# Patient Record
Sex: MALE | Race: AMERICAN INDIAN OR ALASKA NATIVE | URBAN - METROPOLITAN AREA
[De-identification: names, ages, dates, MRNs, and addresses within clinical notes are randomized per-mention and may not be internally consistent; named-entity substitution may affect disease eponyms.]

---

## 2018-09-30 ENCOUNTER — TRANSFERRED RECORDS (OUTPATIENT)
Dept: HEALTH INFORMATION MANAGEMENT | Facility: CLINIC | Age: 68
End: 2018-09-30

## 2019-01-05 ENCOUNTER — TRANSFERRED RECORDS (OUTPATIENT)
Dept: HEALTH INFORMATION MANAGEMENT | Facility: CLINIC | Age: 69
End: 2019-01-05

## 2019-01-06 ENCOUNTER — TRANSFERRED RECORDS (OUTPATIENT)
Dept: HEALTH INFORMATION MANAGEMENT | Facility: CLINIC | Age: 69
End: 2019-01-06

## 2019-01-08 ENCOUNTER — TRANSFERRED RECORDS (OUTPATIENT)
Dept: HEALTH INFORMATION MANAGEMENT | Facility: CLINIC | Age: 69
End: 2019-01-08

## 2019-01-15 ENCOUNTER — RECORDS - HEALTHEAST (OUTPATIENT)
Dept: ADMINISTRATIVE | Facility: OTHER | Age: 69
End: 2019-01-15

## 2019-02-20 ENCOUNTER — TRANSFERRED RECORDS (OUTPATIENT)
Dept: HEALTH INFORMATION MANAGEMENT | Facility: CLINIC | Age: 69
End: 2019-02-20

## 2019-02-22 ENCOUNTER — TRANSFERRED RECORDS (OUTPATIENT)
Dept: HEALTH INFORMATION MANAGEMENT | Facility: CLINIC | Age: 69
End: 2019-02-22

## 2019-04-22 ENCOUNTER — TRANSFERRED RECORDS (OUTPATIENT)
Dept: HEALTH INFORMATION MANAGEMENT | Facility: CLINIC | Age: 69
End: 2019-04-22

## 2019-08-12 ENCOUNTER — RECORDS - HEALTHEAST (OUTPATIENT)
Dept: ADMINISTRATIVE | Facility: OTHER | Age: 69
End: 2019-08-12

## 2019-08-12 ENCOUNTER — TRANSFERRED RECORDS (OUTPATIENT)
Dept: HEALTH INFORMATION MANAGEMENT | Facility: CLINIC | Age: 69
End: 2019-08-12

## 2019-08-13 ENCOUNTER — RECORDS - HEALTHEAST (OUTPATIENT)
Dept: ADMINISTRATIVE | Facility: OTHER | Age: 69
End: 2019-08-13

## 2019-08-13 ENCOUNTER — TRANSFERRED RECORDS (OUTPATIENT)
Dept: HEALTH INFORMATION MANAGEMENT | Facility: CLINIC | Age: 69
End: 2019-08-13

## 2019-08-15 ENCOUNTER — AMBULATORY - HEALTHEAST (OUTPATIENT)
Dept: VASCULAR SURGERY | Facility: CLINIC | Age: 69
End: 2019-08-15

## 2019-08-15 DIAGNOSIS — N18.5 CHRONIC KIDNEY DISEASE (CKD), STAGE V (H): ICD-10-CM

## 2019-08-16 ENCOUNTER — MEDICAL CORRESPONDENCE (OUTPATIENT)
Dept: HEALTH INFORMATION MANAGEMENT | Facility: CLINIC | Age: 69
End: 2019-08-16

## 2019-08-19 ENCOUNTER — REFERRAL (OUTPATIENT)
Dept: TRANSPLANT | Facility: CLINIC | Age: 69
End: 2019-08-19

## 2019-08-19 DIAGNOSIS — E11.9 DIABETES MELLITUS, TYPE 2 (H): ICD-10-CM

## 2019-08-19 DIAGNOSIS — Z76.82 ORGAN TRANSPLANT CANDIDATE: ICD-10-CM

## 2019-08-19 DIAGNOSIS — I10 ESSENTIAL HYPERTENSION: ICD-10-CM

## 2019-08-19 NOTE — LETTER
Dylon Trevizo  [address] [place] [id number]      Dear Dylon,    Thank you for your interest in the Transplant Center at [facility], [facility]. We look forward to being a part of your care team and assisting you through the transplant process.    As we discussed, your transplant coordinator is Patsy Galeana (Kidney).  You may call your coordinator at any time with questions or concerns.  Your first scheduled call will be on 9/6/19.  If this needs to change, call [phone].    Please complete the following.    1. Fill out and return the enclosed forms    Authorization for Electronic Communication    Authorization to Discuss Protected Health Information    Authorization for Release of Protected Health Information    Authorization for Care Everywhere Release of Information    2. Sign up for:    Power[employer] Mobilet, access to your electronic medical record (see enclosed pamphlet)    MasalatransplantMippin, a transplant education website    You can use these tools to learn more about your transplant, communicate with your care team, and track your medical details      Sincerely,      Solid Organ Transplant  [facility], [facility]    Cc: Usha Charlton MD(PCP), Mc Gomez

## 2019-08-19 NOTE — Clinical Note
Orders in epic for evaluation Yamileth and LUIS MIGUEL Notified patients with 10/16/2019 EVAL date; Makenzie and his son will come too. They are aware a schedule will come in two weeks from Yamileth.

## 2019-08-19 NOTE — LETTER
September 6, 2019          RE:  Dylon PANDEY Santhosh, 1950        You may or may not know that the above mutual patient is in evaluation for a kidney transplant here at the [facility].    One of the requirements of our patients to be in our transplant program is for them to provide a letter from a dentist stating that they are cleared for transplant; meaning they do not currently have any infections or dental conditions that would prevent them from going forward with transplant surgery.     Please provide a signed note at your earliest convenience, on company letterhead, with the dentition status of the above referenced patient; our direct fax number is [phone].    Thank you in advance for your assistance in this matter.        Patsy Galeana RN BSN   Kidney Transplant Wait List  [phone] Phone  [phone] Fax  [email or web address]     [facility]  [facility] [address] [address]

## 2019-08-21 ENCOUNTER — AMBULATORY - HEALTHEAST (OUTPATIENT)
Dept: VASCULAR SURGERY | Facility: CLINIC | Age: 69
End: 2019-08-21

## 2019-08-21 DIAGNOSIS — N18.5 CKD (CHRONIC KIDNEY DISEASE), STAGE V (H): ICD-10-CM

## 2019-08-22 ENCOUNTER — DOCUMENTATION ONLY (OUTPATIENT)
Dept: TRANSPLANT | Facility: CLINIC | Age: 69
End: 2019-08-22

## 2019-08-22 VITALS — HEIGHT: 70 IN | BODY MASS INDEX: 32.21 KG/M2 | WEIGHT: 225 LBS

## 2019-08-22 ASSESSMENT — MIFFLIN-ST. JEOR: SCORE: 1801.84

## 2019-08-22 NOTE — TELEPHONE ENCOUNTER
PCP:Usha Charlton    Referring Provider:Mc Gomez   Referring Diagnosis:     Insurance information:  MEDICARE/MEDICAID/Forward  Policy ornelas:  SELF  Subscriber/policy/ID number:  Pt did not have info available  /    Pt did not have info available /[id number]  Group Number:  /    /    Is patient in a group home/assisted living? NO  Does patient have a guardian? NO    Referral intake process completed.  Patient is aware that after financial approval is received, medical records will be requested.   Patient confirmed for a callback from transplant coordinator on 9/6/19. (within 2 weeks)  Tentative evaluation date:  Not scheduled (within 4 weeks)    Confirmed coordinator will discuss evaluation process in more detail at the time of their call.   Patient is aware of the need to arrange age appropriate cancer screening, vaccinations, and dental care.  Reminded patient to complete questionnaire, complete medical records release, and review packet prior to evaluation visit .  Assessed patient for special needs (ie--wheelchair, assistance, guardian, and ):  NONE   Patient instructed to call [phone] with questions.

## 2019-08-28 ENCOUNTER — OFFICE VISIT - HEALTHEAST (OUTPATIENT)
Dept: VASCULAR SURGERY | Facility: CLINIC | Age: 69
End: 2019-08-28

## 2019-08-28 ENCOUNTER — AMBULATORY - HEALTHEAST (OUTPATIENT)
Dept: VASCULAR SURGERY | Facility: CLINIC | Age: 69
End: 2019-08-28

## 2019-08-28 ENCOUNTER — RECORDS - HEALTHEAST (OUTPATIENT)
Dept: VASCULAR ULTRASOUND | Facility: CLINIC | Age: 69
End: 2019-08-28

## 2019-08-28 DIAGNOSIS — N18.5 CHRONIC KIDNEY DISEASE, STAGE 5 (H): ICD-10-CM

## 2019-08-28 DIAGNOSIS — T82.898A OTHER SPECIFIED COMPLICATION OF VASCULAR PROSTHETIC DEVICES, IMPLANTS AND GRAFTS, INITIAL ENCOUNTER (H): ICD-10-CM

## 2019-08-28 DIAGNOSIS — N18.5 CHRONIC KIDNEY DISEASE (CKD), STAGE V (H): ICD-10-CM

## 2019-08-28 DIAGNOSIS — N18.6 ESRD (END STAGE RENAL DISEASE) (H): ICD-10-CM

## 2019-08-28 ASSESSMENT — MIFFLIN-ST. JEOR: SCORE: 1766.9

## 2019-09-05 ENCOUNTER — TRANSFERRED RECORDS (OUTPATIENT)
Dept: HEALTH INFORMATION MANAGEMENT | Facility: CLINIC | Age: 69
End: 2019-09-05

## 2019-09-06 NOTE — TELEPHONE ENCOUNTER
I/Patsy called Dylon and he was driving and could not take my call. I said I would call back.   I called Dylon and left a voice message.   Contacted patient and introduced myself as their Transplant Coordinator, also introduced the role of the Transplant Coordinator in the transplant process.  Explained the purpose of this call including reviewing next steps and answering questions.    Confirmed Referring Provider and Primary Care Physician. Notified patient of the importance of continued communication with referring providers and primary care physicians.    Reviewed components of transplant evaluation process including necessary appointments, tests, and procedures.    Answered questions for patient regarding evaluation, provided my name and contact information and requested they call with any additional questions.    Determined that patient would like additional information regarding transplant by:   Solomon, Phone Call; Encourage Solomon Carson and I Notified patients with 10/16/2019 EVAL date; Makenzie and his son will come too. They are aware a schedule will come in two weeks from Yamileth.      Dr. Marzena Shelby 8/12/2019 note  Progressive CKD mostly likely due to diabetic nephropathy but complicated by episodes of SHAVONNE   HTN controlled   Anemia with documented low epo levels thus anemia of CKD.   Anxiety and depression   Asthma Follows with Pulm.   RTC 2 months.        PCP Care everywhere note Past Medical History: Diagnosis Date     Acidosis 4/26/2018     Anxiety     Asthma   since age 8     Chronic pain     CKD (chronic kidney disease)   stage 3     CKD (chronic kidney disease) stage 4, GFR 15-29 ml/min (HC) 12/12/2018     COAD (chronic obstructive airways disease) (HC)     Depression     Diabetes (HC)     Diabetic eye exam (HC) 09/27/2016     Dyspepsia 12/12/2018     Fracture of right foot 2006   5th digit     Full code status 3/28/2018     GERD (gastroesophageal reflux disease)     Herpes zoster  09/13/2017   Shingles     Hyperlipidemia     Hypertension     Insomnia 09/30/2018     Pain of right upper extremity 4/26/2018     Pneumonia due to infectious organism 1/8/2017     Primary osteoarthritis involving multiple joints 3/20/2018     Shingles 9/5/2017     Vitamin D deficiency 09/30/2018

## 2019-09-11 ENCOUNTER — COMMUNICATION - HEALTHEAST (OUTPATIENT)
Dept: VASCULAR SURGERY | Facility: CLINIC | Age: 69
End: 2019-09-11

## 2019-09-13 ENCOUNTER — AMBULATORY - HEALTHEAST (OUTPATIENT)
Dept: VASCULAR SURGERY | Facility: CLINIC | Age: 69
End: 2019-09-13

## 2019-09-13 DIAGNOSIS — N18.6 ESRD (END STAGE RENAL DISEASE) (H): ICD-10-CM

## 2019-09-23 ENCOUNTER — TRANSFERRED RECORDS (OUTPATIENT)
Dept: HEALTH INFORMATION MANAGEMENT | Facility: CLINIC | Age: 69
End: 2019-09-23

## 2019-09-27 ENCOUNTER — OFFICE VISIT - HEALTHEAST (OUTPATIENT)
Dept: SURGERY | Facility: CLINIC | Age: 69
End: 2019-09-27

## 2019-09-27 ENCOUNTER — COMMUNICATION - HEALTHEAST (OUTPATIENT)
Dept: VASCULAR SURGERY | Facility: CLINIC | Age: 69
End: 2019-09-27

## 2019-09-27 DIAGNOSIS — N18.6 END STAGE RENAL DISEASE (H): ICD-10-CM

## 2019-09-27 ASSESSMENT — MIFFLIN-ST. JEOR: SCORE: 1757.83

## 2019-10-08 ENCOUNTER — SURGERY - HEALTHEAST (OUTPATIENT)
Dept: SURGERY | Facility: CLINIC | Age: 69
End: 2019-10-08

## 2019-10-11 ENCOUNTER — ANESTHESIA - HEALTHEAST (OUTPATIENT)
Dept: SURGERY | Facility: HOSPITAL | Age: 69
End: 2019-10-11

## 2019-10-11 ENCOUNTER — SURGERY - HEALTHEAST (OUTPATIENT)
Dept: SURGERY | Facility: HOSPITAL | Age: 69
End: 2019-10-11

## 2019-10-11 ENCOUNTER — AMBULATORY - HEALTHEAST (OUTPATIENT)
Dept: SURGERY | Facility: CLINIC | Age: 69
End: 2019-10-11

## 2019-10-11 DIAGNOSIS — N18.6 END STAGE RENAL DISEASE (H): ICD-10-CM

## 2019-10-16 ENCOUNTER — ANCILLARY PROCEDURE (OUTPATIENT)
Dept: GENERAL RADIOLOGY | Facility: CLINIC | Age: 69
End: 2019-10-16
Attending: PHYSICIAN ASSISTANT
Payer: MEDICARE

## 2019-10-16 ENCOUNTER — ALLIED HEALTH/NURSE VISIT (OUTPATIENT)
Dept: TRANSPLANT | Facility: CLINIC | Age: 69
End: 2019-10-16
Attending: PHYSICIAN ASSISTANT
Payer: MEDICARE

## 2019-10-16 ENCOUNTER — ANCILLARY PROCEDURE (OUTPATIENT)
Dept: CARDIOLOGY | Facility: CLINIC | Age: 69
End: 2019-10-16
Attending: PHYSICIAN ASSISTANT
Payer: MEDICARE

## 2019-10-16 ENCOUNTER — DOCUMENTATION ONLY (OUTPATIENT)
Dept: TRANSPLANT | Facility: CLINIC | Age: 69
End: 2019-10-16

## 2019-10-16 VITALS
BODY MASS INDEX: 33.21 KG/M2 | WEIGHT: 232 LBS | OXYGEN SATURATION: 96 % | DIASTOLIC BLOOD PRESSURE: 73 MMHG | SYSTOLIC BLOOD PRESSURE: 144 MMHG | HEART RATE: 74 BPM | HEIGHT: 70 IN

## 2019-10-16 DIAGNOSIS — E11.9 DIABETES MELLITUS, TYPE 2 (H): ICD-10-CM

## 2019-10-16 DIAGNOSIS — N18.5 TYPE 2 DIABETES MELLITUS WITH STAGE 5 CHRONIC KIDNEY DISEASE NOT ON CHRONIC DIALYSIS, WITH LONG-TERM CURRENT USE OF INSULIN (H): ICD-10-CM

## 2019-10-16 DIAGNOSIS — Z76.82 ORGAN TRANSPLANT CANDIDATE: ICD-10-CM

## 2019-10-16 DIAGNOSIS — Z12.5 ENCOUNTER FOR SCREENING FOR MALIGNANT NEOPLASM OF PROSTATE: ICD-10-CM

## 2019-10-16 DIAGNOSIS — E11.22 TYPE 2 DIABETES MELLITUS WITH STAGE 5 CHRONIC KIDNEY DISEASE NOT ON CHRONIC DIALYSIS, WITH LONG-TERM CURRENT USE OF INSULIN (H): ICD-10-CM

## 2019-10-16 DIAGNOSIS — N18.5 CKD (CHRONIC KIDNEY DISEASE) STAGE 5, GFR LESS THAN 15 ML/MIN (H): ICD-10-CM

## 2019-10-16 DIAGNOSIS — I10 ESSENTIAL HYPERTENSION: ICD-10-CM

## 2019-10-16 DIAGNOSIS — I65.21 CAROTID OCCLUSION, RIGHT: ICD-10-CM

## 2019-10-16 DIAGNOSIS — Z79.4 TYPE 2 DIABETES MELLITUS WITH STAGE 5 CHRONIC KIDNEY DISEASE NOT ON CHRONIC DIALYSIS, WITH LONG-TERM CURRENT USE OF INSULIN (H): ICD-10-CM

## 2019-10-16 DIAGNOSIS — I65.21 STENOSIS OF RIGHT CAROTID ARTERY: ICD-10-CM

## 2019-10-16 DIAGNOSIS — F41.9 ANXIETY AND DEPRESSION: ICD-10-CM

## 2019-10-16 DIAGNOSIS — Z76.82 ORGAN TRANSPLANT CANDIDATE: Primary | ICD-10-CM

## 2019-10-16 DIAGNOSIS — F32.A ANXIETY AND DEPRESSION: ICD-10-CM

## 2019-10-16 DIAGNOSIS — R06.03 RESPIRATORY DISTRESS: Primary | ICD-10-CM

## 2019-10-16 DIAGNOSIS — N18.6 END STAGE RENAL DISEASE (H): ICD-10-CM

## 2019-10-16 DIAGNOSIS — J45.50 SEVERE PERSISTENT ASTHMA, UNSPECIFIED WHETHER COMPLICATED (H): ICD-10-CM

## 2019-10-16 DIAGNOSIS — Z01.818 PRE-TRANSPLANT EVALUATION FOR CKD (CHRONIC KIDNEY DISEASE): Primary | ICD-10-CM

## 2019-10-16 DIAGNOSIS — N18.6 ESRD (END STAGE RENAL DISEASE) (H): Primary | ICD-10-CM

## 2019-10-16 DIAGNOSIS — N18.5 CKD (CHRONIC KIDNEY DISEASE), STAGE V (H): ICD-10-CM

## 2019-10-16 LAB
ABO + RH BLD: NORMAL
ALBUMIN SERPL-MCNC: 2.8 G/DL (ref 3.4–5)
ALP SERPL-CCNC: 100 U/L (ref 40–150)
ALT SERPL W P-5'-P-CCNC: 17 U/L (ref 0–70)
ANION GAP SERPL CALCULATED.3IONS-SCNC: 8 MMOL/L (ref 3–14)
APTT PPP: 31 SEC (ref 22–37)
AST SERPL W P-5'-P-CCNC: 18 U/L (ref 0–45)
BASOPHILS # BLD AUTO: 0.1 10E9/L (ref 0–0.2)
BASOPHILS NFR BLD AUTO: 0.9 %
BILIRUB SERPL-MCNC: 0.2 MG/DL (ref 0.2–1.3)
BLD GP AB SCN SERPL QL: NORMAL
BLOOD BANK CMNT PATIENT-IMP: NORMAL
BUN SERPL-MCNC: 85 MG/DL (ref 7–30)
CALCIUM SERPL-MCNC: 7.9 MG/DL (ref 8.5–10.1)
CHLORIDE SERPL-SCNC: 115 MMOL/L (ref 94–109)
CO2 SERPL-SCNC: 18 MMOL/L (ref 20–32)
CREAT SERPL-MCNC: 6.31 MG/DL (ref 0.66–1.25)
DIFFERENTIAL METHOD BLD: ABNORMAL
EOSINOPHIL # BLD AUTO: 0.8 10E9/L (ref 0–0.7)
EOSINOPHIL NFR BLD AUTO: 10.1 %
ERYTHROCYTE [DISTWIDTH] IN BLOOD BY AUTOMATED COUNT: 13.9 % (ref 10–15)
GFR SERPL CREATININE-BSD FRML MDRD: 8 ML/MIN/{1.73_M2}
GLUCOSE SERPL-MCNC: 112 MG/DL (ref 70–99)
HBA1C MFR BLD: 4.8 % (ref 0–5.6)
HCT VFR BLD AUTO: 30.5 % (ref 40–53)
HGB BLD-MCNC: 9.5 G/DL (ref 13.3–17.7)
IMM GRANULOCYTES # BLD: 0 10E9/L (ref 0–0.4)
IMM GRANULOCYTES NFR BLD: 0.5 %
INR PPP: 0.99 (ref 0.86–1.14)
LYMPHOCYTES # BLD AUTO: 2.5 10E9/L (ref 0.8–5.3)
LYMPHOCYTES NFR BLD AUTO: 30.6 %
MCH RBC QN AUTO: 29.6 PG (ref 26.5–33)
MCHC RBC AUTO-ENTMCNC: 31.1 G/DL (ref 31.5–36.5)
MCV RBC AUTO: 95 FL (ref 78–100)
MONOCYTES # BLD AUTO: 0.7 10E9/L (ref 0–1.3)
MONOCYTES NFR BLD AUTO: 8.4 %
NEUTROPHILS # BLD AUTO: 4.1 10E9/L (ref 1.6–8.3)
NEUTROPHILS NFR BLD AUTO: 49.5 %
NRBC # BLD AUTO: 0 10*3/UL
NRBC BLD AUTO-RTO: 0 /100
PLATELET # BLD AUTO: 258 10E9/L (ref 150–450)
POTASSIUM SERPL-SCNC: 5.5 MMOL/L (ref 3.4–5.3)
PROT SERPL-MCNC: 6.2 G/DL (ref 6.8–8.8)
PSA SERPL-ACNC: 1.57 UG/L (ref 0–4)
RADIOLOGIST FLAGS: NORMAL
RBC # BLD AUTO: 3.21 10E12/L (ref 4.4–5.9)
SODIUM SERPL-SCNC: 141 MMOL/L (ref 133–144)
SPECIMEN EXP DATE BLD: NORMAL
SPECIMEN EXP DATE BLD: NORMAL
WBC # BLD AUTO: 8.2 10E9/L (ref 4–11)

## 2019-10-16 PROCEDURE — 81241 F5 GENE: CPT | Performed by: PHYSICIAN ASSISTANT

## 2019-10-16 PROCEDURE — 84681 ASSAY OF C-PEPTIDE: CPT | Performed by: PHYSICIAN ASSISTANT

## 2019-10-16 PROCEDURE — 85025 COMPLETE CBC W/AUTO DIFF WBC: CPT | Performed by: PHYSICIAN ASSISTANT

## 2019-10-16 PROCEDURE — 86644 CMV ANTIBODY: CPT | Performed by: PHYSICIAN ASSISTANT

## 2019-10-16 PROCEDURE — 86787 VARICELLA-ZOSTER ANTIBODY: CPT | Performed by: PHYSICIAN ASSISTANT

## 2019-10-16 PROCEDURE — G0472 HEP C SCREEN HIGH RISK/OTHER: HCPCS | Performed by: PHYSICIAN ASSISTANT

## 2019-10-16 PROCEDURE — 86481 TB AG RESPONSE T-CELL SUSP: CPT | Performed by: PHYSICIAN ASSISTANT

## 2019-10-16 PROCEDURE — 81240 F2 GENE: CPT | Performed by: PHYSICIAN ASSISTANT

## 2019-10-16 PROCEDURE — 85730 THROMBOPLASTIN TIME PARTIAL: CPT | Performed by: PHYSICIAN ASSISTANT

## 2019-10-16 PROCEDURE — 86704 HEP B CORE ANTIBODY TOTAL: CPT | Performed by: PHYSICIAN ASSISTANT

## 2019-10-16 PROCEDURE — 85597 PHOSPHOLIPID PLTLT NEUTRALIZ: CPT | Performed by: PHYSICIAN ASSISTANT

## 2019-10-16 PROCEDURE — 86665 EPSTEIN-BARR CAPSID VCA: CPT | Performed by: PHYSICIAN ASSISTANT

## 2019-10-16 PROCEDURE — 85670 THROMBIN TIME PLASMA: CPT | Performed by: PHYSICIAN ASSISTANT

## 2019-10-16 PROCEDURE — G0499 HEPB SCREEN HIGH RISK INDIV: HCPCS | Performed by: PHYSICIAN ASSISTANT

## 2019-10-16 PROCEDURE — G0103 PSA SCREENING: HCPCS | Performed by: PHYSICIAN ASSISTANT

## 2019-10-16 PROCEDURE — 40000866 ZZHCL STATISTIC HIV 1/2 ANTIGEN/ANTIBODY PRETRANSPLANT ONLY: Performed by: PHYSICIAN ASSISTANT

## 2019-10-16 PROCEDURE — 86147 CARDIOLIPIN ANTIBODY EA IG: CPT | Performed by: PHYSICIAN ASSISTANT

## 2019-10-16 PROCEDURE — 86901 BLOOD TYPING SEROLOGIC RH(D): CPT | Performed by: PHYSICIAN ASSISTANT

## 2019-10-16 PROCEDURE — 86900 BLOOD TYPING SEROLOGIC ABO: CPT | Mod: 91 | Performed by: PHYSICIAN ASSISTANT

## 2019-10-16 PROCEDURE — 86886 COOMBS TEST INDIRECT TITER: CPT | Performed by: PHYSICIAN ASSISTANT

## 2019-10-16 PROCEDURE — 83036 HEMOGLOBIN GLYCOSYLATED A1C: CPT | Performed by: PHYSICIAN ASSISTANT

## 2019-10-16 PROCEDURE — 85613 RUSSELL VIPER VENOM DILUTED: CPT | Performed by: PHYSICIAN ASSISTANT

## 2019-10-16 PROCEDURE — 86706 HEP B SURFACE ANTIBODY: CPT | Performed by: PHYSICIAN ASSISTANT

## 2019-10-16 PROCEDURE — 36415 COLL VENOUS BLD VENIPUNCTURE: CPT | Performed by: PHYSICIAN ASSISTANT

## 2019-10-16 PROCEDURE — 85610 PROTHROMBIN TIME: CPT | Performed by: PHYSICIAN ASSISTANT

## 2019-10-16 PROCEDURE — 86780 TREPONEMA PALLIDUM: CPT | Performed by: PHYSICIAN ASSISTANT

## 2019-10-16 PROCEDURE — 85732 THROMBOPLASTIN TIME PARTIAL: CPT | Performed by: PHYSICIAN ASSISTANT

## 2019-10-16 PROCEDURE — 80053 COMPREHEN METABOLIC PANEL: CPT | Performed by: PHYSICIAN ASSISTANT

## 2019-10-16 PROCEDURE — 86850 RBC ANTIBODY SCREEN: CPT | Performed by: PHYSICIAN ASSISTANT

## 2019-10-16 PROCEDURE — 86905 BLOOD TYPING RBC ANTIGENS: CPT | Performed by: PHYSICIAN ASSISTANT

## 2019-10-16 RX ORDER — LISINOPRIL 40 MG/1
40 TABLET ORAL 2 TIMES DAILY
Status: ON HOLD | COMMUNITY
End: 2021-08-22

## 2019-10-16 RX ORDER — ATORVASTATIN CALCIUM 20 MG/1
10 TABLET, FILM COATED ORAL DAILY
COMMUNITY
Start: 2018-12-06

## 2019-10-16 RX ORDER — GABAPENTIN 100 MG/1
CAPSULE ORAL
Status: ON HOLD | COMMUNITY
Start: 2018-03-09 | End: 2021-08-22

## 2019-10-16 RX ORDER — ALBUTEROL SULFATE 90 UG/1
2 AEROSOL, METERED RESPIRATORY (INHALATION) EVERY 6 HOURS PRN
COMMUNITY
Start: 2016-11-16

## 2019-10-16 RX ORDER — HYDROMORPHONE HYDROCHLORIDE 2 MG/1
2 TABLET ORAL EVERY 6 HOURS PRN
Status: ON HOLD | COMMUNITY
Start: 2019-10-11 | End: 2021-08-28

## 2019-10-16 RX ORDER — CETIRIZINE HYDROCHLORIDE 10 MG/1
TABLET ORAL
COMMUNITY
End: 2021-08-21

## 2019-10-16 RX ORDER — PANTOPRAZOLE SODIUM 40 MG/1
40 TABLET, DELAYED RELEASE ORAL
Status: ON HOLD | COMMUNITY
End: 2021-08-22

## 2019-10-16 RX ORDER — NEBULIZER ACCESSORIES
KIT MISCELLANEOUS
Status: ON HOLD | COMMUNITY
Start: 2019-06-26 | End: 2021-08-22

## 2019-10-16 RX ORDER — LANOLIN ALCOHOL/MO/W.PET/CERES
3 CREAM (GRAM) TOPICAL
COMMUNITY
Start: 2018-11-26 | End: 2021-08-21

## 2019-10-16 RX ORDER — CHLORTHALIDONE 25 MG/1
25 TABLET ORAL 2 TIMES DAILY
Status: ON HOLD | COMMUNITY
End: 2021-08-22

## 2019-10-16 RX ORDER — ONDANSETRON 4 MG/1
4 TABLET, ORALLY DISINTEGRATING ORAL
COMMUNITY
Start: 2019-06-20 | End: 2021-08-21

## 2019-10-16 RX ORDER — SENNOSIDES A AND B 8.6 MG/1
1 TABLET, FILM COATED ORAL
COMMUNITY
Start: 2018-12-06 | End: 2021-08-21

## 2019-10-16 RX ORDER — ACETAMINOPHEN 325 MG/1
650 TABLET ORAL EVERY 6 HOURS PRN
COMMUNITY

## 2019-10-16 RX ORDER — CARVEDILOL 12.5 MG/1
25 TABLET ORAL 2 TIMES DAILY WITH MEALS
COMMUNITY
Start: 2018-11-26

## 2019-10-16 RX ORDER — SEVELAMER CARBONATE FOR ORAL SUSPENSION 800 MG/1
0.8 POWDER, FOR SUSPENSION ORAL
Status: ON HOLD | COMMUNITY
Start: 2018-11-26 | End: 2021-08-22

## 2019-10-16 RX ORDER — CLINDAMYCIN PHOSPHATE 11.9 MG/ML
SOLUTION TOPICAL
COMMUNITY
Start: 2019-06-21 | End: 2021-08-21

## 2019-10-16 RX ORDER — LORATADINE 10 MG/1
10 TABLET ORAL
COMMUNITY
End: 2021-08-21

## 2019-10-16 RX ORDER — POLYETHYLENE GLYCOL 3350 17 G/17G
17 POWDER, FOR SOLUTION ORAL
COMMUNITY
Start: 2018-03-09 | End: 2021-08-21

## 2019-10-16 RX ORDER — BUDESONIDE AND FORMOTEROL FUMARATE DIHYDRATE 160; 4.5 UG/1; UG/1
2 AEROSOL RESPIRATORY (INHALATION)
COMMUNITY
Start: 2019-06-26 | End: 2021-08-21

## 2019-10-16 RX ORDER — GLUCAGON 1 MG/ML
1 KIT INJECTION
COMMUNITY
Start: 2018-11-26 | End: 2021-08-21

## 2019-10-16 RX ORDER — CYCLOBENZAPRINE HCL 10 MG
TABLET ORAL
COMMUNITY
Start: 2019-01-08 | End: 2021-08-21

## 2019-10-16 RX ORDER — PETROLATUM,WHITE/LANOLIN
OINTMENT (GRAM) TOPICAL
COMMUNITY
Start: 2019-05-08 | End: 2021-08-21

## 2019-10-16 RX ORDER — IPRATROPIUM BROMIDE AND ALBUTEROL SULFATE 2.5; .5 MG/3ML; MG/3ML
3 SOLUTION RESPIRATORY (INHALATION) EVERY 6 HOURS PRN
COMMUNITY
Start: 2016-04-25

## 2019-10-16 RX ORDER — HALOPERIDOL 5 MG/1
2.5-5 TABLET ORAL
Status: ON HOLD | COMMUNITY
Start: 2018-11-26 | End: 2021-08-28

## 2019-10-16 RX ORDER — ERGOCALCIFEROL 1.25 MG/1
CAPSULE, LIQUID FILLED ORAL
Refills: 0 | COMMUNITY
Start: 2019-10-01 | End: 2021-08-21

## 2019-10-16 RX ORDER — FLUTICASONE PROPIONATE 50 MCG
SPRAY, SUSPENSION (ML) NASAL
COMMUNITY
Start: 2016-01-19 | End: 2021-08-21

## 2019-10-16 RX ORDER — BETAMETHASONE VALERATE 0.1 %
LOTION (ML) TOPICAL
COMMUNITY
End: 2021-08-21

## 2019-10-16 RX ORDER — SODIUM BICARBONATE 325 MG/1
325 TABLET ORAL
Status: ON HOLD | COMMUNITY
Start: 2018-03-09 | End: 2021-08-22

## 2019-10-16 RX ORDER — OLANZAPINE 5 MG/1
2.5 TABLET ORAL
Status: ON HOLD | COMMUNITY
End: 2021-08-28

## 2019-10-16 RX ORDER — DOXYCYCLINE 100 MG/1
CAPSULE ORAL
Refills: 0 | COMMUNITY
Start: 2019-06-21 | End: 2021-08-21

## 2019-10-16 RX ORDER — AMLODIPINE BESYLATE 10 MG/1
TABLET ORAL
Status: ON HOLD | COMMUNITY
Start: 2019-06-26 | End: 2021-08-22

## 2019-10-16 RX ORDER — LORAZEPAM 1 MG/1
1 TABLET ORAL
COMMUNITY
Start: 2018-11-26 | End: 2021-08-21

## 2019-10-16 RX ORDER — PAROXETINE 20 MG/1
TABLET, FILM COATED ORAL
Status: ON HOLD | COMMUNITY
Start: 2018-11-26 | End: 2021-08-22

## 2019-10-16 RX ORDER — FAMOTIDINE 20 MG/1
20 TABLET, FILM COATED ORAL
COMMUNITY
Start: 2018-11-26 | End: 2021-08-21

## 2019-10-16 RX ORDER — SODIUM POLYSTYRENE SULFONATE 4.1 MEQ/G
POWDER, FOR SUSPENSION ORAL; RECTAL
COMMUNITY
Start: 2019-09-05 | End: 2021-08-21

## 2019-10-16 RX ORDER — FUROSEMIDE 40 MG
40 TABLET ORAL DAILY
COMMUNITY
Start: 2016-03-07

## 2019-10-16 RX ORDER — DOCUSATE SODIUM 100 MG/1
100 CAPSULE, LIQUID FILLED ORAL
COMMUNITY
Start: 2018-11-26 | End: 2021-08-21

## 2019-10-16 ASSESSMENT — MIFFLIN-ST. JEOR: SCORE: 1825.66

## 2019-10-16 NOTE — LETTER
10/16/2019       RE: Dylon Trevizo  [address] [facility] [address]     Dear Colleague,    Thank you for referring your patient, Dylon Trevizo, to the [facility] SOLID ORGAN TRANSPLANT at [facility]. Please see a copy of my visit note below.    RE: Dylon Trevizo    [facility]# [id number]        I saw your patient, Dylon Trevizo, in consultation in our pretransplant clinic.  He was at clinic to discuss care for his end-stage renal disease.  He was at clinic with his significant other.  Prior to clinic, they attended our pretransplant class.       We talked about the pros and cons of transplantation vs. dialysis.  We discussed the fact that it was important that he think about the pros and cons of each treatment option and make an active decision.  We also discussed the fact that the two were interconnected and he may need to go on dialysis before transplant (if he chose to have a transplant) and that if the transplant failed, he might need dialysis before another transplant.       We also discussed the fact that if he chose to have a transplant, he would need to decide between going on the wait list for a  donor transplant vs. having a living donor transplant.  We talked about the pros and cons of each option.  Although I didn't express an opinion regarding transplantation or dialysis, I suggested that if he chose to have a transplant, a living donor transplant would be preferable in that the surgery is the same, the immunosuppressive drugs and the risks are the same, but the transplant could be done sooner and the results are better.  I told him that the wait for  donor kidney was approximately five years for patients who are newly put on the waiting list.  In addition, we talked about the fact that the disadvantage of a living donor transplant was the risk to the donor.       Because he was interested in living donation, we spent some time discussing  "donor risks, including the risks of mortality, morbidity, and long-term risks with a single kidney. I also provided him with our donor DVD to view and share at his leisure.     Finally, I also discussed the new ( donor) kidney (KDPI) scoring system with him.  We discussed the advantages and disadvantages of accepting an \"expanded criteria\" donor kidney, and the latest data as to who is potentially benefited by receiving an expanded criteria donor kidney versus waiting longer for a standard criteria donor.     I attempted to answer any remaining questions.  I also told him that should he have any questions, he should feel free to contact us.  We would be glad to answer any questions either over the phone or at another clinic visit.  His transplant coordinator is Patsy Galeana and may be reached at [phone].  Thank you for the opportunity to see him.     I spent 25 minutes with this patient.  Over 90% of that time was spent in counseling and coordination of care.             Yours truly,               Rodrigo Lopez MD         Professor of Surgery         ([phone])    AJ/st    Again, thank you for allowing me to participate in the care of your patient.      Sincerely,    LEEANNE      "

## 2019-10-16 NOTE — PROGRESS NOTES
Patient was seen by myself, Dr. Mitchel Wilkinson, in conjunction with Christi Dickens, as part of a shared visit.    I personally reviewed past medical and surgical history, vital signs, medications and labs.  Present and past medical history, along with significant physical exam findings were all reviewed with SARA.    My key findings:  Dylon Trevizo is a 68 year old year old male with CKD from diabetes mellitus type 2, who presents for Kidney transplant evaluation.  Patient was diagnosed with CKD in 2016. He has had progression and planning initiation of PD next week.     DMII: many years. Now off insulin. a1c good on last check. No amputations, no ulcers.     Lost 100 lbs during his hospitalization with severe asthma.     Asthma: uncontrolled. On symbicort, flonase, albuterol.     Was hospitalized with need for intubation with trach, ?anaphylactic vs. Asthma. Is going to see pulmonary here.     Living donor possible.     CAD: no known cad. ECHO today. Borderline gradient for aortic stenosis    Ca screens: colonoscopy up to date.     No cp, sob as above. No n/v, no f/s/c. Exercise tolerance worse.    Hx of complete total occlusion of R carotid.     Key management decisions made by me and discussed with SARA:  1. Kidney transplant evaluation - patient is a fair candidate overall. Benefits of a living donor transplant were discussed. Has potential living donors. Will need workup as below.   2. CKD from diabetes mellitus type 2  3. Carotid disease: will follow up the carotid ultrasound from prior. With hx of complete R carotid, will have him see vascular for evaluationa and management.   4. DMII: now off insulin with weight loss  5. Asthma: will have him establish with pulmonary here and get PAC clearance once controlled.   6. CAD risk: will have cardiology evaluate given risk factors.   7. Ca screens: colonoscopy up to date.   8. PAD: get iliac ultrasound. Will get CT images pushed over from harish

## 2019-10-16 NOTE — PROGRESS NOTES
Mr Trevizo was schedule fo an ECHO and the ECHO tech brought the patient into the PFT saying that he can't breath.  He told me that his inhaler isn't working and that if he could get a NEB he would be fine. Listened to his chest and it was very tight not moving much air. Patient was in respiratory distress with labored breathing and Dyspnea.  He is normally on Duoneb so I mixed up and Albuterol Atrovent neb and administered the meds

## 2019-10-16 NOTE — PROGRESS NOTES
Outpatient MNT: Kidney Transplant Evaluation    Current BMI: 33.8 (HT 69.5 in,  lbs/105 kg)  BMI is within criteria of <35 for kidney transplant  Recommend BMI remain <35 or <240 lbs for transplant      Time Spent: 15 minutes  Visit Type: Initial  Referring Physician: Jessica  Pt accompanied by: his SO, Makenzie     Medical dx associated with RD referral  - CKD V  - DM II    History of previous txp: none  Dialysis: no, but had PD catheter placed last week    Nutrition Assessment  Both pt and his SO cook at home w/o added salt. He also reports monitoring K intake, having resources for this at home. Pt eats 1 meal/day on average d/t lack of appetite since admissions/medical status within the past year.    Vitamins, Supplements, Pertinent Meds: vit D, renvela (takes 75% of the time, but later on reports not taking it in relation to PO intakes, despite this is how it is prescribed)  Herbal Medicines/Supplements: none     Diet Recall  Breakfast None    Lunch None    Dinner Chicken or fish with white rice and veggies or salad    Snacks Apples or grapes    Beverages Water, SoBe water, decaf coffee    Alcohol None    Dining out A few times/month, but reports typically ordering chicken      Physical Activity  Limited d/t weakness, but able to ambulate around home      Anthropometrics  Height:   69.5 in   BMI:    33.8    Weight Status:Obesity Grade I BMI 30-34.9   Weight:  232 lbs            IBW (lb): 163  % IBW: 142    Wt Hx: Pt reports edema has resolved. Weight stable lately, but he reports losing weight from 300s over the past year d/t hospital admissions. Per chart review, weight within 10 lbs of weight last year at this time.     Adj/dosing BW: 180 lbs/82 kg       Labs  K 5.2 on 8/12  Phos 6.1 on 9/5, with mostly high levels at least since March     Malnutrition  % Intake: <75% for >/= 3 months (non-severe malnutrition)  % Weight Loss: None noted  Subcutaneous Fat Loss: None  Muscle Loss: None  Fluid  Accumulation/Edema: None noted  Malnutrition Diagnosis: Patient does not meet two of the above criteria necessary for diagnosing malnutrition     Estimated Nutrition Needs  Energy  9548-4629     (20-25 kcal/kg dosing BW for desired wt loss)       Protein  49-66 vs     (0.6-0.8 g/kg for CKD) vs   (1.2-1.4 g/kg for HD/PD needs)         Fluid  1 ml/kcal or per MD   Micronutrient   Na+: <2000 mg/day  K+: 0294-2656 mg/day  Phos: 800-1000 mg/day            Nutrition Diagnosis  Food and nutrition related knowledge deficit r/t pre kidney transplant eval AEB pt verbalized not hearing pre/post transplant diet guidelines.    Nutrition Intervention  Nutrition education provided:  Discussed sodium intake (low sodium foods and drinks, seasoning food without salt and tips for low sodium diet). Reviewed h/o high Phos levels and importance of taking renvela with all foods and while eating in order for it to be effective. Did discuss BMI near our upper recommendation for transplant and to monitor weight, especially with plan to start PD soon. Encouraged pt to try eating more throughout the day as opposed to just 1 meal at night, as this will be especially important once he starts dialysis in order to consume adequate protein.     Reviewed post txp diet guidelines in brief (will review in further detail post txp):  (1) Review of proper food safety measures d/t immunosuppressant therapy post-op and increased risk for food-borne illness    (2) Avoid the following post txp d/t risk for rejection, unknown effects on the organs, and/or potential interactions with immunosuppressants:  - Herbal, Chinese, holistic, chiropractic, natural, alternative medicines and supplements  - Detoxes and cleanses  - Weight loss pills  - Protein powders or other products with extracts or herbs (ie green tea extract)    (3) Med regimen and possible side effects    Patient Understanding: Pt verbalized understanding of education provided.  Expected  Compliance: Good  Follow-Up Plans: PRN     Nutrition Goals  1. BMI to remain <35 or <240 lbs for transplant   2. Pt to verbalize understanding of 3 aspects of post txp education provided  3. Take renvela with food    Provided pt with contact info.   Giulia Alfaro RD, LD  [facility] [phone]

## 2019-10-16 NOTE — PROGRESS NOTES
"Kidney Transplant Referral - 8/16/2019  Dylon Trevizo attended the pre-transplant patient education class today. The [facility] website pre-transplant modules were viewed; class participants were educated on using the site.     Content reviewed:    Living Donation and how to access that program    Paired exchange    Kidney Donor Profile Index (KDPI)    Waiting list issues (right to decline without penalty, high PHS risk donors, what to expect when called with an offer)    Hospital experience,  length of stay , need to stay locally post-discharge (2-4 weeks)    Surgical options (with pictures)                             Post-surgery lifting and driving restrictions    Post-transplant routines, frequency of lab work and clinic visits    Need to stay locally post-discharge (2-4 weeks)    Role of Transplant Coordinator    Participants were informed of the benefits of transplant as well as potential risks such as infection, cancer, and death.  The need for total adherence with immunosuppression medications and following transplant regimens was stressed.  The overall evaluation/approval/listing process was reviewed.        The patient was provided with the following documents:  What You Need to Know About a Kidney Transplant  Adult Kidney Transplant - A Guide for Patients  SRTR Data Sheet - Kidney  Brochure - Kidney Allocation  Brochure - Multiple Listing and Waiting Time Transfer  What Every Patient Needs to Know (UNOS)  UNOS Facts and Figures  Finding a Donor  [facility] - Quick Start Guide  KDPI Consent  Receipt of Information form    Dylon Trevizo signed the  Receipt of Information for Organ Transplant Recipient.\" He was provided Patsy Galeana's business card and instructed to call with additional questions.      Summary    Team s concerns/comments:   1) BMI  2) Asthma  3) Cardiac Clearance  4) Health Maintenance  5) PAD/PVD  6) Carotid artery    Candidacy category: " Yellow    Action/Plan: Patient to continue with scheduled appointments and tests  1) Encouraged weight loss  2) PFTs, Pulmonary consult  3) Cardiology consult  4)   5) Iliac US/Doppler, Request images of Abd CT completed at [facility] in 6/2018-Coordinator informed.  6) Carotid US, Vascular consult    Expected Selection Meeting Discussion: 10/23/19

## 2019-10-16 NOTE — PROGRESS NOTES
"Psychosocial Assessment  Patient Name/ Age: Dylon Trevizo 68 year old   Medical Record #: [id number]  Duration of Interview:     40  min  Process:   Face-to-Face Interview                (counseling < 50%)   Present at Appointment: Esteban and his girlfriend Jeanine        :ZAYRA Wise, LICSW Date:  October 16, 2019        Type of transplant: Kidney    Donor type:   Esteban indicated his nephew and son have expressed interest in being a donor.   Cadaver, son and relative   Prior Transplants:    No Status of Transplant:       Current Living Situation    Location:   [address] [place] [id number]  With Whom: Jeanine       Family/ Social Support:    Esteban has a son Da (32) who is incarcerated and a half sister Chaya (38) who lives in [place], [place].   Available, helpful   Committed relationship:  Esteban and Jeanine have been in a relationship for 20 years.   Stable/supportive   Other supports:   Friends Available, helpful       Activities/ Functional Ability    Current level: ambulatory and independent with ADL's     Transportation drives self       Vocational/Employment/Financial     Employment   retired   Job Description      Income   [facility] nursing home   Insurance  Medicare Parts A and B, [facility] [employer] and Part D through [facility].    At this time, patient can afford medication costs:  Yes  Medicare and MA       Medical Status    Current mode of treatment for ESRD None   Complications None       Behavioral    Tobacco Use No Chemical Dependency No   Esteban indicated he had his last cigarette \"a few days ago\" but he didn't \"inhale\". Esteban indicated he has not used any illegal drugs in 20 years.     Psychiatric Impairment No  Esteban indicated he has a PRN medication for anxiety.    Reading ability Good  Education Level: Technical College Recent Legal History No        Coping Style/Strategies: Esteban indicated when under stress he will talk to a friend or play Xbox.   Ability to Adhere to " Complex Medical Regime: Yes     Adherence History:        Education  _X_ Medicare  _X_ Rehabilitation  _X_ Donor issues  _X_ Community resources  _X_ Post discharge housing  _X_ Financial resources  _X_ Medical insurance options  _X_ Psych adjustment  _X_ Family adjustment  _X_ Health Care Directive - Provided Education   Psychosocial Risks of Transplant Reviewed and Discussed:  _X_ Increased stress related to emotional,            family, social, employment or financial           situation  _X_ Affect on work and/or disability benefits  _X_ Affect on future life insurance  _X_ Transplant outcome expectations may           not be met  _X_ Mental Health Risks: anxiety,           depression, PTSD, guilt, grief and           chronic fatigue     Notable Items:   Esteban indicated several times during this assessment he has other medical issues he needs to take care of prior to transplant.  He appeared to be focused on these issues interfering with his ability to listen to material being presented.  This writer recommends having Esteban re-assessed once his other medical issues are under control.      Final Evaluation/Assessment   Patient was not processing information. Did not appear well informed and unsure if he is able to follow post transplant requirements. Behavior was appropriate during interview. Has adequate income and insurance coverage. Adequate social support.  Unsure if patient understands the risks and benefits of transplant.      Recommendation  Conditional - See under notable items.   Selection Criteria Met:  Plan for support Yes   Chemical Dependence Yes   Smoking Yes   Mental Health Yes   Adequate Finances Yes    Signature: ZAYRA Wise, [facility]   Title: Clinical

## 2019-10-16 NOTE — PROGRESS NOTES
Assessment and Plan:  # Kidney Transplant Evaluation: Patient is a fair candidate overall. Benefits of a living donor transplant were discussed.    # CKD stage 5 from presumed diabetes mellitus type 2: not yet on dialysis, but eGFR < 10 ml/min, and starting process for vascular access placement. He may benefit from a kidney transplant.    # Cardiac Risk: he will need risk assessment due to multiple chronic risk factors. 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    # Asthma: poorly controlled with history of several admissions including one in October 2018 for severe asthma exacerbation requiring prolonged intubation with eventual tracheostomy that was complicated by vascular injury requiring sternotomy for innominate artery repair. Will refer to pulmonary medicine here for further evaluation and management.     # Type 2 Diabetes: currently controlled and has not required insulin in the past year. We discussed potential need for insulin post-kidney transplant.    # Abnormal CXR: will get outside films for comparison. If no outside films, he will need a non-contrast chest CT.    # PAD screen: will have outside June 2018 non-contrast abd/pelv CT images pushed over for surgery to review.    # Anxiety and Depression: Appreciate social work input.     # Health Maintenance: Colonoscopy: Up to date and Dental: Up to date    Discussed the risks and benefits of a transplant, including the risk of surgery and immunosuppression medications.  Patient's overall evaluation will be discussed in the Transplant Program's regular meeting with a final recommendation on the patients suitability for transplant to be made at that time.  Patient was seen in conjunction with Dr. Mitchel Wilkinson as part of a shared visit.    Evaluation:  Dylon DANNIE Santhosh was seen in consultation at the request of Dr. Rodrigo Lopez for evaluation as a potential kidney transplant  recipient.    Reason for Visit:  Dylon Trevizo is a 68-year-old male with CKD from diabetes mellitus type 2, who presents for kidney transplant evaluation.    History of Present Illness:         Kidney Disease Hx: CKD from presumed diabetes and has been following with nephrology since 2016. Negative serologic work up in 2017. No kidney biopsy. Has had a progressive decline in kidney function with significant decrease in eGFR starting earlier this year. Not yet on dialysis, but working on AVF placement. Now with an eGFR < 10 ml/min.       Kidney Disease Dx: Diabetes mellitus type 2       Biopsy Proven: No         On Dialysis: No       Primary Nephrologist: Dr. Shelby       H/o Kidney Stones: No       H/o Recurrent/Frequent UTI: No         Diabetic Hx: Type 2       Diagnosis Date: diagnosed 20+ years ago       Medication History: Initially on oral medications before switching to insulin. He was previously using 30 units long acting and sliding scale insulin, but has not needed insulin in the past year. Blood sugars recently have been .       Diabetic Control: Controlled (HbA1c <7%)   Last HbA1c: 4.8%       Hypoglycemic Unawareness: No       End-Organ Damage due to DM: Nephropathy         Cardiac/Vascular Disease Risk Factors:        Cardiac Risk Factors: Diabetes, Hypertension, CKD, Smoking and Age (Male > 55, Female > 65)       Known CAD: No       Known PAD/Caludication Symptoms: No       Known Heart Failure: No       Arrhythmia: No       Pulmonary Hypertension: No       Valvular Disease: No       Other: None         Functional Capacity/Frailty:        He doesn't do anything in particular for exercise but can walk several blocks and go up and down stairs without chest pain, SOB, or claudication symptoms.      Fatigue/Decreased Energy: [] No [x] Yes    Chest Pain or SOB with Exertion: [x] No [] Yes    Significant Weight Change: [x] No [] Yes    Nausea, Vomiting or Diarrhea: [x] No [] Yes    Fever, Sweats or  "Chills:  [x] No [] Yes    Leg Swelling [x] No [] Yes        History of Cancer: None    Other Significant Medical Issues:   - Asthma: very poorly controlled. Multiple admits. Prolonged admit in October 2018 for severe asthma exacerbation requiring prolonged intubation with eventual tracheostomy that was complicated by vascular injury requiring sternotomy for innominate artery repair. Currently using albuterol 4-5 times during the day, 2-3 times in the evening, and at least once nightly. Also takes symbicort 2 puffs twice daily and duonebs every morning. Prednisone prn.     Review of Systems:  A comprehensive review of systems was obtained and negative, except as noted in the HPI or PMH.    Past Medical History:   Medical record was reviewed and PMH was discussed with patient and noted below.  Past Medical History:   Diagnosis Date     Anxiety and depression      Asthma, severe persistent, poorly-controlled 1958     CKD (chronic kidney disease) stage 5, GFR less than 15 ml/min (H)      Hypertension      Type 2 diabetes mellitus (H) 1980's       Past Social History:   Past Surgical History:   Procedure Laterality Date     APPENDECTOMY  1970's     COLONOSCOPY  2018    [facility], [place], operated by [facility]     HEAD & NECK SURGERY  as child    \"tumor removed x 2\"     THORACIC SURGERY       Personal history of bleeding or anesthesia problems: No    Family History:  Family History   Problem Relation Age of Onset     Lung Cancer Father      Heart Disease Father      Lung Cancer Sister      Lung Cancer Brother      Heart Disease Brother      Heart Disease Mother        Personal History:   Social History     Socioeconomic History     Marital status:      Spouse name: Not on file     Number of children: Not on file     Years of education: Not on file     Highest education level: Not on file   Occupational History     Not on file   Social Needs     Financial resource strain: Not on file     Food insecurity:     Worry: Not on file     Inability: Not on " file     Transportation needs:     Medical: Not on file     Non-medical: Not on file   Tobacco Use     Smoking status: Former Smoker     Last attempt to quit: 1999     Years since quittin.8     Smokeless tobacco: Never Used   Substance and Sexual Activity     Alcohol use: Yes     Comment: Not currently     Drug use: Not Currently     Sexual activity: Not on file   Lifestyle     Physical activity:     Days per week: Not on file     Minutes per session: Not on file     Stress: Not on file   Relationships     Social connections:     Talks on phone: Not on file     Gets together: Not on file     Attends [employer] service: Not on file     Active member of club or organization: Not on file     Attends meetings of clubs or organizations: Not on file     Relationship status: Not on file     Intimate partner violence:     Fear of current or ex partner: Not on file     Emotionally abused: Not on file     Physically abused: Not on file     Forced sexual activity: Not on file   Other Topics Concern     Parent/sibling w/ CABG, MI or angioplasty before 65F 55M? Not Asked   Social History Narrative     Not on file       Allergies:  Allergies   Allergen Reactions     Ace Inhibitors Unknown and Other (See Comments)     Hyperkalemia  Hyperkalemia  hyperkalemia       Losartan Unknown and Other (See Comments)     hyperkalemia  Hyperkalemia  hyperkalemia       Tramadol Other (See Comments) and Shortness Of Breath     SOB  Dyspnea       Aminophylline Nausea and Vomiting and Unknown     Nausea/vomiting       Folic Acid-Vit B6-Vit B12 Unknown     Unknown  Other reaction(s): Unknown  Unknown       Sulfamethoxazole-Trimethoprim Nausea and Vomiting and Nausea     Nausea/vomiting       Theophylline Nausea and Vomiting     Nausea/vomiting       Vitamin B12 Unknown     unknown  Other reaction(s): *Unknown       Ceftriaxone Rash     Came in with strep, rash after eeceiving rocephin, localized to his back only-c/o of itching  Came in with  strep, rash after eeceiving rocephin, localized to his back only-c/o of itching  Rash/itching  Came in with strep, rash after eeceiving rocephin, localized to his back only-c/o of itching  Rash/itching  Came in with strep, rash after eeceiving rocephin, localized to his back only-c/o of itching  Tolerated cefazolin 10/2018  Came in with strep, rash after eeceiving rocephin, localized to his back only-c/o of itching       Clavulanic Acid Nausea and Rash     aka  AUGMENTIN  (NAUSEA)  aka  AUGMENTIN  (NAUSEA)  Unknown  aka  AUGMENTIN  (NAUSEA)  Unknown  aka  AUGMENTIN  (NAUSEA)  Other reaction(s): Nausea Only  aka  AUGMENTIN  (NAUSEA)  aka  AUGMENTIN  (NAUSEA)       Hydralazine Muscle Pain (Myalgia) and Rash     Muscle aches, severe cramps, constant, felt like RA in hands  Muscle aches; severe cramps, felt like RA in hands, constant   myalgia         Medications:  Current Outpatient Medications   Medication Sig     acetaminophen (TYLENOL) 325 MG tablet Take 325 mg by mouth     albuterol (VENTOLIN HFA) 108 (90 Base) MCG/ACT inhaler Ventolin HFA 90 mcg/actuation aerosol inhaler     amLODIPine (NORVASC) 10 MG tablet amlodipine 10 mg tablet     aspirin (ASA) 81 MG tablet Take 81 mg by mouth     atorvastatin (LIPITOR) 20 MG tablet Take 10 mg by mouth     betamethasone valerate (VALISONE) 0.1 % external lotion      blood glucose (FREESTYLE TEST STRIPS) test strip blood sugar diagnostic strips   Use one strip to check 3 times daily.     budesonide-formoterol (SYMBICORT) 160-4.5 MCG/ACT Inhaler Inhale 2 puffs into the lungs     carvedilol (COREG) 12.5 MG tablet Take 12.5 mg by mouth     cetirizine (ZYRTEC ALLERGY) 10 MG tablet Zyrtec 10 mg tablet   Take 0.5 tablets 3 times a week by oral route as directed.     chlorthalidone (HYGROTON) 25 MG tablet Take 25 mg by mouth 2 times daily     clindamycin (CLEOCIN T) 1 % external solution      cyclobenzaprine (FLEXERIL) 10 MG tablet cyclobenzaprine 10 mg tablet     diclofenac  (VOLTAREN) 1 % topical gel Apply topically to affected area(s) 4 times daily. Apply 2g up to 4x daily     docusate sodium (COLACE) 100 MG capsule Take 100 mg by mouth     doxycycline monohydrate (MONODOX) 100 MG capsule      famotidine (PEPCID) 20 MG tablet Take 20 mg by mouth     fluticasone (FLONASE) 50 MCG/ACT nasal spray fluticasone propionate 50 mcg/actuation nasal spray,suspension     fluticasone-vilanterol (BREO ELLIPTA) 200-25 MCG/INH inhaler Inhale 1 puff into the lungs     furosemide (LASIX) 40 MG tablet Take 40 mg by mouth     gabapentin (NEURONTIN) 100 MG capsule gabapentin 100 mg capsule     glucagon 1 MG SOLR injection Inject 1 mg into the muscle     haloperidol (HALDOL) 5 MG tablet Take 2.5-5 mg by mouth     HYDROmorphone (DILAUDID) 2 MG tablet Take 2 mg by mouth     ipratropium - albuterol 0.5 mg/2.5 mg/3 mL (DUONEB) 0.5-2.5 (3) MG/3ML neb solution 3 mLs every 6 hours     lisinopril (PRINIVIL/ZESTRIL) 40 MG tablet Take 40 mg by mouth 2 times daily     loratadine (CLARITIN) 10 MG tablet Take 10 mg by mouth every 48 hours     LORazepam (ATIVAN) 1 MG tablet Take 1 mg by mouth     melatonin 3 MG tablet Take 3 mg by mouth     OLANZapine (ZYPREXA) 5 MG tablet Take 2.5 mg by mouth     ondansetron (ZOFRAN-ODT) 4 MG ODT tab Place 4 mg under the tongue     ONETOUCH ULTRA test strip      pantoprazole (PROTONIX) 40 MG EC tablet Take 40 mg by mouth     PARoxetine (PAXIL) 20 MG tablet paroxetine 20 mg tablet     polyethylene glycol (MIRALAX/GLYCOLAX) packet Take 17 g by mouth     Respiratory Therapy Supplies (AIRS DISPOSABLE NEBULIZER) KIT Neb machine     senna (SENOKOT) 8.6 MG tablet Take 1 tablet by mouth     sevelamer carbonate, RENVELA, 0.8 GM PACK Packet Take 0.8 g by mouth     Sharps Container (SHARPS-A-GATOR LOCKING BRACKET) MISC CPAP, heated humidifier, mask, headgear, filters and tubing. For home use. Pressure: 10   Length of Need: 99     sodium bicarbonate 325 MG tablet Take 325 mg by mouth     sodium  "polystyrene (KAYEXALATE) powder Take 15g by mouth once daily x14 days, then recheck lab work.     vitamin A & D (BABY) external ointment      vitamin D2 (ERGOCALCIFEROL) 09520 units (1250 mcg) capsule      blood glucose (ONETOUCH ULTRA) test strip USE six times a day. Use as directed. Pharmacy dispense brand based on insurance.     blood glucose (ONETOUCH ULTRA) test strip USE ONE STRIP TO CHECK 3 TIMES DAILY.     No current facility-administered medications for this visit.        Vitals:  BP (!) 144/73   Pulse 74   Ht 1.765 m (5' 9.5\")   Wt 105.2 kg (232 lb)   SpO2 96%   BMI 33.77 kg/m      Exam:  GENERAL APPEARANCE: alert and no distress  HENT: mouth without ulcers or lesions. Fair dentition  LYMPHATICS: no cervical or supraclavicular nodes  RESP: lungs clear to auscultation - no rales, rhonchi or wheezes  CV: regular rhythm, normal rate, no rub, no murmur  EDEMA: no LE edema bilaterally  ABDOMEN: soft, nondistended, nontender  MS: extremities normal - no gross deformities noted, no evidence of inflammation in joints, no muscle tenderness  SKIN: no rash  Femoral pulses 2+ equal bilaterally    Results:   Recent Results (from the past 336 hour(s))   XR Chest 2 Views [IMG36]    Collection Time: 10/16/19  7:03 AM   Result Value Ref Range    Radiologist flags Left perihilar opacity. Compare to old films or    EKG 12-lead, tracing only [EKG1]    Collection Time: 10/16/19 12:07 PM   Result Value Ref Range    Interpretation ECG Click View Image link to view waveform and result    ABO type [REG6722]    Collection Time: 10/16/19  1:39 PM   Result Value Ref Range    ABO O     RH(D) Pos     Specimen Expires 10/19/2019    HLA Typing Complete SOT Recipient    Collection Time: 10/16/19  1:44 PM   Result Value Ref Range    ABTest Method SSOP     A* locus A*11     A* A*24     B* locus B*15 (62)     B* B*51     C* locus C*04     C* C*15     Bw-1 Bw*6     Bw-2 Bw*4     Drsso Test Method SSOP     DRB1* locus DRB1*04     DRB1* " DRB1*08     DRB4* locus DRB4*01     DQB1* locus DQB1*03(07)     DQB1* DQB1*04     DQA1*locus DQA1*03     DQA1* DQA1*04     DPB1* DPB1*05:01     DPB1* NMDP BPPGZ     DPB1*locus DPB1*35:01     DPA1* DPA1*02:01     DPA1* NMDP CBVG     DPA1*locus DPA1*02:07     DPA1* locus NMDP BJFTZ    PRA Single Antigen IgG Antibody    Collection Time: 10/16/19  1:44 PM   Result Value Ref Range    SA1 Test Method SA FCS     SA1 Cell Class I     SA1 Hi Risk Thelma None     SA1 Mod Risk Thelma None     SA1 Comments        Test performed by modified procedure. Serum heat inactivated and tested   by a modified ([facility]) protocol including fetal calf serum addition.   High-risk, mfi >3,000. Mod-risk, mfi 500-3,000.      SA2 Test Method SA FCS     SA2 Cell Class II     SA2 Hi Risk Thelma None     SA2 Mod Risk Thelma DP:19     SA2 Comments        Test performed by modified procedure. Serum heat inactivated and tested   by a modified ([place]) protocol including fetal calf serum addition.   High-risk, mfi >3,000. Mod-risk, mfi 500-3,000.      Protocol Cutoff Plan A, 500 mfi cumulative      UNOS cPRA 0     Unacceptable Antigen None    CMV Antibody IgG [[id number]]    Collection Time: 10/16/19  1:44 PM   Result Value Ref Range    CMV Antibody IgG >8.0 (H) 0.0 - 0.8 AI   EBV Capsid Antibody IgG [[id number]]    Collection Time: 10/16/19  1:44 PM   Result Value Ref Range    EBV Capsid Antibody IgG >8.0 (H) 0.0 - 0.8 AI   Hepatitis B core antibody [[id number]]    Collection Time: 10/16/19  1:44 PM   Result Value Ref Range    Hepatitis B Core Thelma Nonreactive NR^Nonreactive   Hepatitis B Surface Antibody [[id number]]    Collection Time: 10/16/19  1:44 PM   Result Value Ref Range    Hepatitis B Surface Antibody 0.10 <8.00 m[IU]/mL   Hepatitis B surface antigen [[id number]]    Collection Time: 10/16/19  1:44 PM   Result Value Ref Range    Hep B Surface Agn Nonreactive NR^Nonreactive   Hepatitis C antibody [IHE522]    Collection Time: 10/16/19  1:44 PM   Result Value Ref Range     Hepatitis C Antibody Nonreactive NR^Nonreactive   HIV Antigen Antibody Combo Pretransplant    Collection Time: 10/16/19  1:44 PM   Result Value Ref Range    HIV Antigen Antibody Combo Pretransplant Nonreactive NR^Nonreactive   Varicella Zoster Virus Antibody IgG [[id number]]    Collection Time: 10/16/19  1:44 PM   Result Value Ref Range    Varicella Zoster Virus Antibody IgG 3.9 (H) 0.0 - 0.8 AI   Treponema Abs w Reflex to RPR and Titer    Collection Time: 10/16/19  1:44 PM   Result Value Ref Range    Treponema Antibodies Nonreactive NR^Nonreactive   Prostate spec antigen screen [[id number])    Collection Time: 10/16/19  1:44 PM   Result Value Ref Range    PSA 1.57 0 - 4 ug/L   Hemoglobin A1c [LAB90]    Collection Time: 10/16/19  1:44 PM   Result Value Ref Range    Hemoglobin A1C 4.8 0 - 5.6 %   C-peptide [QDY849]    Collection Time: 10/16/19  1:44 PM   Result Value Ref Range    C Peptide 8.7 (H) 0.9 - 6.9 ng/mL   Thrombin time [CXH968]    Collection Time: 10/16/19  1:44 PM   Result Value Ref Range    Thrombin Time 16.7 13.0 - 19.0 sec   Partial thromboplastin time [LAB56]    Collection Time: 10/16/19  1:44 PM   Result Value Ref Range    PTT 31 22 - 37 sec   INR [[id number]]    Collection Time: 10/16/19  1:44 PM   Result Value Ref Range    INR 0.99 0.86 - 1.14   Cardiolipin Thelma IgG and IgM [LAB 6836]    Collection Time: 10/16/19  1:44 PM   Result Value Ref Range    Cardiolipin Antibody IgG 2.0 0.0 - 19.9 GPL-U/mL    Cardiolipin Antibody IgM <0.2 0.0 - 19.9 MPL-U/mL   Lupus Anticoagulant Panel [[id number]]    Collection Time: 10/16/19  1:44 PM   Result Value Ref Range    Lupus Result Negative NEG^Negative   CBC with platelets differential [TPE806]    Collection Time: 10/16/19  1:44 PM   Result Value Ref Range    WBC 8.2 4.0 - 11.0 10e9/L    RBC Count 3.21 (L) 4.4 - 5.9 10e12/L    Hemoglobin 9.5 (L) 13.3 - 17.7 g/dL    Hematocrit 30.5 (L) 40.0 - 53.0 %    MCV 95 78 - 100 fl    MCH 29.6 26.5 - 33.0 pg    MCHC 31.1 (L) 31.5 - 36.5  g/dL    RDW 13.9 10.0 - 15.0 %    Platelet Count 258 150 - 450 10e9/L    Diff Method Automated Method     % Neutrophils 49.5 %    % Lymphocytes 30.6 %    % Monocytes 8.4 %    % Eosinophils 10.1 %    % Basophils 0.9 %    % Immature Granulocytes 0.5 %    Nucleated RBCs 0 0 /100    Absolute Neutrophil 4.1 1.6 - 8.3 10e9/L    Absolute Lymphocytes 2.5 0.8 - 5.3 10e9/L    Absolute Monocytes 0.7 0.0 - 1.3 10e9/L    Absolute Eosinophils 0.8 (H) 0.0 - 0.7 10e9/L    Absolute Basophils 0.1 0.0 - 0.2 10e9/L    Abs Immature Granulocytes 0.0 0 - 0.4 10e9/L    Absolute Nucleated RBC 0.0    Quantiferon TB Gold Plus    Collection Time: 10/16/19  1:44 PM   Result Value Ref Range    Quantiferon-TB Gold Plus Result Negative NEG^Negative    TB1 Ag minus Nil Value 0.00 IU/mL    TB2 Ag minus Nil Value 0.00 IU/mL    Mitogen minus Nil Result 6.78 IU/mL    Nil Result 0.06 IU/mL   Comprehensive metabolic panel [LAB17]    Collection Time: 10/16/19  1:44 PM   Result Value Ref Range    Sodium 141 133 - 144 mmol/L    Potassium 5.5 (H) 3.4 - 5.3 mmol/L    Chloride 115 (H) 94 - 109 mmol/L    Carbon Dioxide 18 (L) 20 - 32 mmol/L    Anion Gap 8 3 - 14 mmol/L    Glucose 112 (H) 70 - 99 mg/dL    Urea Nitrogen 85 (H) 7 - 30 mg/dL    Creatinine 6.31 (H) 0.66 - 1.25 mg/dL    GFR Estimate 8 (L) >60 mL/min/[1.73_m2]    GFR Estimate If Black 10 (L) >60 mL/min/[1.73_m2]    Calcium 7.9 (L) 8.5 - 10.1 mg/dL    Bilirubin Total 0.2 0.2 - 1.3 mg/dL    Albumin 2.8 (L) 3.4 - 5.0 g/dL    Protein Total 6.2 (L) 6.8 - 8.8 g/dL    Alkaline Phosphatase 100 40 - 150 U/L    ALT 17 0 - 70 U/L    AST 18 0 - 45 U/L   Blood Group A Subtype [[id number]]    Collection Time: 10/16/19  1:44 PM   Result Value Ref Range    Antigen Type Canceled, Test credited     Blood Bank Comment       Patient is not ABO type A or AB. A subtype not applicable 10/16/19 IF   ABO/Rh type and screen    Collection Time: 10/16/19  1:44 PM   Result Value Ref Range    ABO O     RH(D) Pos     Antibody Screen  Neg     Test Valid Only At          [facility]    Specimen Expires 10/19/2019    Antibody titer red cell [[id number]]    Collection Time: 10/16/19  1:44 PM   Result Value Ref Range    Antibody Titer       Anti B: IgM 32, IgG 64  Anti A: IgM >256, IgG >256     Factor 2 and 5 mutation analysis    Collection Time: 10/16/19  1:44 PM   Result Value Ref Range    Copath Report       Patient Name: KELVIN ARZATE  MR#: [id number]  Specimen #: [id number]  Collected: 10/16/2019 13:44  Received: 10/17/2019 09:58  Reported: 10/21/2019 16:05  Ordering Phy(s): TREVON ERVIN  Additional Phy(s): DELORES PETIT  Copy To:  Patricia    For improved result formatting, select 'View Enhanced Report Format' under   Linked Documents section.  _________________________________________    TEST(S) REQUESTED:  Factor 5 Leiden and Factor 2 by PCR    SPECIMEN DESCRIPTION:  Blood    METHODOLOGY:   The regions of genomic DNA containing the W5700S Factor V   Leiden gene mutation (Factor V  Leiden) and the Factor 2(Prothrombin O49005T) gene mutation were   simultaneously amplified using the polymerase  chain reaction.  The amplified products were digested with restriction   endonuclease TaqI and products were  analyzed by gel electrophoresis.    RESULTS:    Factor V 1691G>A (Leiden)  RESULTS:  Mutation analyzed:     1691G>A  Factor V 1691G>A (Leiden)  Interpretation:      ABSENT   Factor V 1691G>A (Leiden) mutation  genotype:      G/G    FACTOR 2/PROTHROMBIN RESULTS:  Mutation analyzed:     89278H>A  Factor 2 Mutation Interpretation:      ABSENT  Factor 2 Mutation genotype:      G/G    INTERPRETATION:  The patient is negative for the Factor V 1691G>A (Leiden) and negative for   the Factor 2 mutation.    COMMENTS:  If a patient is the recipient of an allogeneic bone marrow transplant,   this test must be done on a  pre-transplant sample or buccal swab.  A previous allogeneic bone marrow    transplant will interfere with test  results.  Call the [facility]([phone]) for   instructions on sample collection for these  patients.    This test was developed and its performance characteristics determined by   the [facility],  [facility]. It has not been cleared or   approved by the FDA. The laboratory is  regulated under CLIA as qualified to perform high-complexity testing. This   test is used for clin ical purposes.  It should not be regarded as investigational or for research.    A resident/fellow in an accredited training program was involved in the   selection of testing, review of  laboratory data, and/or interpretation of this case.  I, as the senior   physician, attest that I: (i) confirmed  appropriate testing, (ii) examined the relevant raw data for the   specimen(s); and (iii) rendered or confirmed  the interpretation(s).    Electronically Signed Out By:  Carlos Aaron MD    CPT Codes:  A: 75017-C3SJKN, 93538-F4OFOH, -ZFSHAX(2)    TESTING LAB LOCATION:  [address] [id number] [phone]    COLLECTION SITE:  Client:  [facility]  Location:  [facility] (B)

## 2019-10-16 NOTE — LETTER
10/16/2019       RE: Dylon Trevizo  [address] [facility] [address]     Dear Colleague,    Thank you for referring your patient, Dylon Trevizo, to the [facility] at [facility]. Please see a copy of my visit note below.    Patient was seen by myself, Dr. Mitchel Wilkinson, in conjunction with Christi Dickens, as part of a shared visit.    I personally reviewed past medical and surgical history, vital signs, medications and labs.  Present and past medical history, along with significant physical exam findings were all reviewed with SARA.    My key findings:  Dylon Trevizo is a 68 year old year old male with CKD from diabetes mellitus type 2, who presents for Kidney transplant evaluation.  Patient was diagnosed with CKD in 2016. He has had progression and planning initiation of PD next week.     DMII: many years. Now off insulin. a1c good on last check. No amputations, no ulcers.     Lost 100 lbs during his hospitalization with severe asthma.     Asthma: uncontrolled. On symbicort, flonase, albuterol.     Was hospitalized with need for intubation with trach, ?anaphylactic vs. Asthma. Is going to see pulmonary here.     Living donor possible.     CAD: no known cad. ECHO today. Borderline gradient for aortic stenosis    Ca screens: colonoscopy up to date.     No cp, sob as above. No n/v, no f/s/c. Exercise tolerance worse.    Hx of complete total occlusion of R carotid.     Key management decisions made by me and discussed with SARA:  1. Kidney transplant evaluation - patient is a fair candidate overall. Benefits of a living donor transplant were discussed. Has potential living donors. Will need workup as below.   2. CKD from diabetes mellitus type 2  3. Carotid disease: will follow up the carotid ultrasound from prior. With hx of complete R carotid, will have him see vascular for evaluationa and management.   4. DMII: now off insulin with weight loss  5.  Asthma: will have him establish with pulmonary here and get PAC clearance once controlled.   6. CAD risk: will have cardiology evaluate given risk factors.   7. Ca screens: colonoscopy up to date.   8. PAD: get iliac ultrasound. Will get CT images pushed over from [facility]      Assessment and Plan:  # Kidney Transplant Evaluation: Patient is a fair candidate overall. Benefits of a living donor transplant were discussed.    # CKD stage 5 from presumed diabetes mellitus type 2: not yet on dialysis, but eGFR < 10 ml/min, and starting process for vascular access placement. He may benefit from a kidney transplant.    # Cardiac Risk: he will need risk assessment due to multiple chronic risk factors. 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    # Asthma: poorly controlled with history of several admissions including one in October 2018 for severe asthma exacerbation requiring prolonged intubation with eventual tracheostomy that was complicated by vascular injury requiring sternotomy for innominate artery repair. Will refer to pulmonary medicine here for further evaluation and management.     # Type 2 Diabetes: currently controlled and has not required insulin in the past year. We discussed potential need for insulin post-kidney transplant.    # Abnormal CXR: will get outside films for comparison. If no outside films, he will need a non-contrast chest CT.    # PAD screen: will have outside June 2018 non-contrast abd/pelv CT images pushed over for surgery to review.    # Anxiety and Depression: Appreciate social work input.     # Health Maintenance: Colonoscopy: Up to date and Dental: Up to date    Discussed the risks and benefits of a transplant, including the risk of surgery and immunosuppression medications.  Patient's overall evaluation will be discussed in the Transplant Program's regular meeting with a final recommendation on the patients suitability  for transplant to be made at that time.  Patient was seen in conjunction with Dr. Mitchel Wilkinson as part of a shared visit.    Evaluation:  Dylon Trevizo was seen in consultation at the request of Dr. Rodrigo Lopez for evaluation as a potential kidney transplant recipient.    Reason for Visit:  Dylon Trevizo is a 68-year-old male with CKD from diabetes mellitus type 2, who presents for kidney transplant evaluation.    History of Present Illness:         Kidney Disease Hx: CKD from presumed diabetes and has been following with nephrology since 2016. Negative serologic work up in 2017. No kidney biopsy. Has had a progressive decline in kidney function with significant decrease in eGFR starting earlier this year. Not yet on dialysis, but working on AVF placement. Now with an eGFR < 10 ml/min.       Kidney Disease Dx: Diabetes mellitus type 2       Biopsy Proven: No         On Dialysis: No       Primary Nephrologist: Dr. Shelby       H/o Kidney Stones: No       H/o Recurrent/Frequent UTI: No         Diabetic Hx: Type 2       Diagnosis Date: diagnosed 20+ years ago       Medication History: Initially on oral medications before switching to insulin. He was previously using 30 units long acting and sliding scale insulin, but has not needed insulin in the past year. Blood sugars recently have been .       Diabetic Control: Controlled (HbA1c <7%)   Last HbA1c: 4.8%       Hypoglycemic Unawareness: No       End-Organ Damage due to DM: Nephropathy         Cardiac/Vascular Disease Risk Factors:        Cardiac Risk Factors: Diabetes, Hypertension, CKD, Smoking and Age (Male > 55, Female > 65)       Known CAD: No       Known PAD/Caludication Symptoms: No       Known Heart Failure: No       Arrhythmia: No       Pulmonary Hypertension: No       Valvular Disease: No       Other: None         Functional Capacity/Frailty:        He doesn't do anything in particular for exercise but can walk several blocks and go up and down  "stairs without chest pain, SOB, or claudication symptoms.      Fatigue/Decreased Energy: [] No [x] Yes    Chest Pain or SOB with Exertion: [x] No [] Yes    Significant Weight Change: [x] No [] Yes    Nausea, Vomiting or Diarrhea: [x] No [] Yes    Fever, Sweats or Chills:  [x] No [] Yes    Leg Swelling [x] No [] Yes        History of Cancer: None    Other Significant Medical Issues:   - Asthma: very poorly controlled. Multiple admits. Prolonged admit in October 2018 for severe asthma exacerbation requiring prolonged intubation with eventual tracheostomy that was complicated by vascular injury requiring sternotomy for innominate artery repair. Currently using albuterol 4-5 times during the day, 2-3 times in the evening, and at least once nightly. Also takes symbicort 2 puffs twice daily and duonebs every morning. Prednisone prn.     Review of Systems:  A comprehensive review of systems was obtained and negative, except as noted in the HPI or PMH.    Past Medical History:   Medical record was reviewed and PMH was discussed with patient and noted below.  Past Medical History:   Diagnosis Date     Anxiety and depression      Asthma, severe persistent, poorly-controlled 1958     CKD (chronic kidney disease) stage 5, GFR less than 15 ml/min (H)      Hypertension      Type 2 diabetes mellitus (H) 1980's       Past Social History:   Past Surgical History:   Procedure Laterality Date     APPENDECTOMY  1970's     COLONOSCOPY  2018    [facility]     HEAD & NECK SURGERY  as child    \"tumor removed x 2\"     THORACIC SURGERY       Personal history of bleeding or anesthesia problems: No    Family History:  Family History   Problem Relation Age of Onset     Lung Cancer Father      Heart Disease Father      Lung Cancer Sister      Lung Cancer Brother      Heart Disease Brother      Heart Disease Mother        Personal History:   Social History     Socioeconomic History     Marital status:      Spouse name: Not on file     Number of " children: Not on file     Years of education: Not on file     Highest education level: Not on file   Occupational History     Not on file   Social Needs     Financial resource strain: Not on file     Food insecurity:     Worry: Not on file     Inability: Not on file     Transportation needs:     Medical: Not on file     Non-medical: Not on file   Tobacco Use     Smoking status: Former Smoker     Last attempt to quit: 1999     Years since quittin.8     Smokeless tobacco: Never Used   Substance and Sexual Activity     Alcohol use: Yes     Comment: Not currently     Drug use: Not Currently     Sexual activity: Not on file   Lifestyle     Physical activity:     Days per week: Not on file     Minutes per session: Not on file     Stress: Not on file   Relationships     Social connections:     Talks on phone: Not on file     Gets together: Not on file     Attends Gnosticism service: Not on file     Active member of club or organization: Not on file     Attends meetings of clubs or organizations: Not on file     Relationship status: Not on file     Intimate partner violence:     Fear of current or ex partner: Not on file     Emotionally abused: Not on file     Physically abused: Not on file     Forced sexual activity: Not on file   Other Topics Concern     Parent/sibling w/ CABG, MI or angioplasty before 65F 55M? Not Asked   Social History Narrative     Not on file       Allergies:  Allergies   Allergen Reactions     Ace Inhibitors Unknown and Other (See Comments)     Hyperkalemia  Hyperkalemia  hyperkalemia       Losartan Unknown and Other (See Comments)     hyperkalemia  Hyperkalemia  hyperkalemia       Tramadol Other (See Comments) and Shortness Of Breath     SOB  Dyspnea       Aminophylline Nausea and Vomiting and Unknown     Nausea/vomiting       Folic Acid-Vit B6-Vit B12 Unknown     Unknown  Other reaction(s): Unknown  Unknown       Sulfamethoxazole-Trimethoprim Nausea and Vomiting and Nausea      Nausea/vomiting       Theophylline Nausea and Vomiting     Nausea/vomiting       Vitamin B12 Unknown     unknown  Other reaction(s): *Unknown       Ceftriaxone Rash     Came in with strep, rash after eeceiving rocephin, localized to his back only-c/o of itching  Came in with strep, rash after eeceiving rocephin, localized to his back only-c/o of itching  Rash/itching  Came in with strep, rash after eeceiving rocephin, localized to his back only-c/o of itching  Rash/itching  Came in with strep, rash after eeceiving rocephin, localized to his back only-c/o of itching  Tolerated cefazolin 10/2018  Came in with strep, rash after eeceiving rocephin, localized to his back only-c/o of itching       Clavulanic Acid Nausea and Rash     aka  AUGMENTIN  (NAUSEA)  aka  AUGMENTIN  (NAUSEA)  Unknown  aka  AUGMENTIN  (NAUSEA)  Unknown  aka  AUGMENTIN  (NAUSEA)  Other reaction(s): Nausea Only  aka  AUGMENTIN  (NAUSEA)  aka  AUGMENTIN  (NAUSEA)       Hydralazine Muscle Pain (Myalgia) and Rash     Muscle aches, severe cramps, constant, felt like RA in hands  Muscle aches; severe cramps, felt like RA in hands, constant   myalgia         Medications:  Current Outpatient Medications   Medication Sig     acetaminophen (TYLENOL) 325 MG tablet Take 325 mg by mouth     albuterol (VENTOLIN HFA) 108 (90 Base) MCG/ACT inhaler Ventolin HFA 90 mcg/actuation aerosol inhaler     amLODIPine (NORVASC) 10 MG tablet amlodipine 10 mg tablet     aspirin (ASA) 81 MG tablet Take 81 mg by mouth     atorvastatin (LIPITOR) 20 MG tablet Take 10 mg by mouth     betamethasone valerate (VALISONE) 0.1 % external lotion      blood glucose (FREESTYLE TEST STRIPS) test strip blood sugar diagnostic strips   Use one strip to check 3 times daily.     budesonide-formoterol (SYMBICORT) 160-4.5 MCG/ACT Inhaler Inhale 2 puffs into the lungs     carvedilol (COREG) 12.5 MG tablet Take 12.5 mg by mouth     cetirizine (ZYRTEC ALLERGY) 10 MG tablet Zyrtec 10 mg tablet   Take  0.5 tablets 3 times a week by oral route as directed.     chlorthalidone (HYGROTON) 25 MG tablet Take 25 mg by mouth 2 times daily     clindamycin (CLEOCIN T) 1 % external solution      cyclobenzaprine (FLEXERIL) 10 MG tablet cyclobenzaprine 10 mg tablet     diclofenac (VOLTAREN) 1 % topical gel Apply topically to affected area(s) 4 times daily. Apply 2g up to 4x daily     docusate sodium (COLACE) 100 MG capsule Take 100 mg by mouth     doxycycline monohydrate (MONODOX) 100 MG capsule      famotidine (PEPCID) 20 MG tablet Take 20 mg by mouth     fluticasone (FLONASE) 50 MCG/ACT nasal spray fluticasone propionate 50 mcg/actuation nasal spray,suspension     fluticasone-vilanterol (BREO ELLIPTA) 200-25 MCG/INH inhaler Inhale 1 puff into the lungs     furosemide (LASIX) 40 MG tablet Take 40 mg by mouth     gabapentin (NEURONTIN) 100 MG capsule gabapentin 100 mg capsule     glucagon 1 MG SOLR injection Inject 1 mg into the muscle     haloperidol (HALDOL) 5 MG tablet Take 2.5-5 mg by mouth     HYDROmorphone (DILAUDID) 2 MG tablet Take 2 mg by mouth     ipratropium - albuterol 0.5 mg/2.5 mg/3 mL (DUONEB) 0.5-2.5 (3) MG/3ML neb solution 3 mLs every 6 hours     lisinopril (PRINIVIL/ZESTRIL) 40 MG tablet Take 40 mg by mouth 2 times daily     loratadine (CLARITIN) 10 MG tablet Take 10 mg by mouth every 48 hours     LORazepam (ATIVAN) 1 MG tablet Take 1 mg by mouth     melatonin 3 MG tablet Take 3 mg by mouth     OLANZapine (ZYPREXA) 5 MG tablet Take 2.5 mg by mouth     ondansetron (ZOFRAN-ODT) 4 MG ODT tab Place 4 mg under the tongue     ONETOUCH ULTRA test strip      pantoprazole (PROTONIX) 40 MG EC tablet Take 40 mg by mouth     PARoxetine (PAXIL) 20 MG tablet paroxetine 20 mg tablet     polyethylene glycol (MIRALAX/GLYCOLAX) packet Take 17 g by mouth     Respiratory Therapy Supplies (AIRS DISPOSABLE NEBULIZER) KIT Neb machine     senna (SENOKOT) 8.6 MG tablet Take 1 tablet by mouth     sevelamer carbonate, RENVELA, 0.8 GM  "PACK Packet Take 0.8 g by mouth     Sharps Container (SHARPS-A-GATOR LOCKING BRACKET) MISC CPAP, heated humidifier, mask, headgear, filters and tubing. For home use. Pressure: 10   Length of Need: 99     sodium bicarbonate 325 MG tablet Take 325 mg by mouth     sodium polystyrene (KAYEXALATE) powder Take 15g by mouth once daily x14 days, then recheck lab work.     vitamin A & D (BABY) external ointment      vitamin D2 (ERGOCALCIFEROL) 50261 units (1250 mcg) capsule      blood glucose (ONETOUCH ULTRA) test strip USE six times a day. Use as directed. Pharmacy dispense brand based on insurance.     blood glucose (ONETOUCH ULTRA) test strip USE ONE STRIP TO CHECK 3 TIMES DAILY.     No current facility-administered medications for this visit.        Vitals:  BP (!) 144/73   Pulse 74   Ht 1.765 m (5' 9.5\")   Wt 105.2 kg (232 lb)   SpO2 96%   BMI 33.77 kg/m       Exam:  GENERAL APPEARANCE: alert and no distress  HENT: mouth without ulcers or lesions. Fair dentition  LYMPHATICS: no cervical or supraclavicular nodes  RESP: lungs clear to auscultation - no rales, rhonchi or wheezes  CV: regular rhythm, normal rate, no rub, no murmur  EDEMA: no LE edema bilaterally  ABDOMEN: soft, nondistended, nontender  MS: extremities normal - no gross deformities noted, no evidence of inflammation in joints, no muscle tenderness  SKIN: no rash  Femoral pulses 2+ equal bilaterally    Results:   Recent Results (from the past 336 hour(s))   XR Chest 2 Views [IMG36]    Collection Time: 10/16/19  7:03 AM   Result Value Ref Range    Radiologist flags Left perihilar opacity. Compare to old films or    EKG 12-lead, tracing only [EKG1]    Collection Time: 10/16/19 12:07 PM   Result Value Ref Range    Interpretation ECG Click View Image link to view waveform and result    ABO type [XQY8197]    Collection Time: 10/16/19  1:39 PM   Result Value Ref Range    ABO O     RH(D) Pos     Specimen Expires 10/19/2019    HLA Typing Complete SOT Recipient "    Collection Time: 10/16/19  1:44 PM   Result Value Ref Range    ABTest Method SSOP     A* locus A*11     A* A*24     B* locus B*15 (62)     B* B*51     C* locus C*04     C* C*15     Bw-1 Bw*6     Bw-2 Bw*4     Drsso Test Method SSOP     DRB1* locus DRB1*04     DRB1* DRB1*08     DRB4* locus DRB4*01     DQB1* locus DQB1*03(07)     DQB1* DQB1*04     DQA1*locus DQA1*03     DQA1* DQA1*04     DPB1* DPB1*05:01     DPB1* NMDP BPPGZ     DPB1*locus DPB1*35:01     DPA1* DPA1*02:01     DPA1* NMDP CBVG     DPA1*locus DPA1*02:07     DPA1* locus NMDP BJFTZ    PRA Single Antigen IgG Antibody    Collection Time: 10/16/19  1:44 PM   Result Value Ref Range    SA1 Test Method SA FCS     SA1 Cell Class I     SA1 Hi Risk Thelma None     SA1 Mod Risk Thelma None     SA1 Comments        Test performed by modified procedure. Serum heat inactivated and tested   by a modified ([facility]) protocol including fetal calf serum addition.   High-risk, mfi >3,000. Mod-risk, mfi 500-3,000.      SA2 Test Method SA FCS     SA2 Cell Class II     SA2 Hi Risk Thelma None     SA2 Mod Risk Thelma DP:19     SA2 Comments        Test performed by modified procedure. Serum heat inactivated and tested   by a modified ([place]) protocol including fetal calf serum addition.   High-risk, mfi >3,000. Mod-risk, mfi 500-3,000.      Protocol Cutoff Plan A, 500 mfi cumulative      UNOS cPRA 0     Unacceptable Antigen None    CMV Antibody IgG [ITC1601]    Collection Time: 10/16/19  1:44 PM   Result Value Ref Range    CMV Antibody IgG >8.0 (H) 0.0 - 0.8 AI   EBV Capsid Antibody IgG [ISR4549]    Collection Time: 10/16/19  1:44 PM   Result Value Ref Range    EBV Capsid Antibody IgG >8.0 (H) 0.0 - 0.8 AI   Hepatitis B core antibody [UYW3926]    Collection Time: 10/16/19  1:44 PM   Result Value Ref Range    Hepatitis B Core Thelma Nonreactive NR^Nonreactive   Hepatitis B Surface Antibody [[id number]]    Collection Time: 10/16/19  1:44 PM   Result Value Ref Range    Hepatitis B Surface Antibody  0.10 <8.00 m[IU]/mL   Hepatitis B surface antigen [PXK620]    Collection Time: 10/16/19  1:44 PM   Result Value Ref Range    Hep B Surface Agn Nonreactive NR^Nonreactive   Hepatitis C antibody [PYR101]    Collection Time: 10/16/19  1:44 PM   Result Value Ref Range    Hepatitis C Antibody Nonreactive NR^Nonreactive   HIV Antigen Antibody Combo Pretransplant    Collection Time: 10/16/19  1:44 PM   Result Value Ref Range    HIV Antigen Antibody Combo Pretransplant Nonreactive NR^Nonreactive   Varicella Zoster Virus Antibody IgG [[id number]]    Collection Time: 10/16/19  1:44 PM   Result Value Ref Range    Varicella Zoster Virus Antibody IgG 3.9 (H) 0.0 - 0.8 AI   Treponema Abs w Reflex to RPR and Titer    Collection Time: 10/16/19  1:44 PM   Result Value Ref Range    Treponema Antibodies Nonreactive NR^Nonreactive   Prostate spec antigen screen [VRE6445)    Collection Time: 10/16/19  1:44 PM   Result Value Ref Range    PSA 1.57 0 - 4 ug/L   Hemoglobin A1c [LAB90]    Collection Time: 10/16/19  1:44 PM   Result Value Ref Range    Hemoglobin A1C 4.8 0 - 5.6 %   C-peptide [PVL206]    Collection Time: 10/16/19  1:44 PM   Result Value Ref Range    C Peptide 8.7 (H) 0.9 - 6.9 ng/mL   Thrombin time [OLC351]    Collection Time: 10/16/19  1:44 PM   Result Value Ref Range    Thrombin Time 16.7 13.0 - 19.0 sec   Partial thromboplastin time [LAB56]    Collection Time: 10/16/19  1:44 PM   Result Value Ref Range    PTT 31 22 - 37 sec   INR [PGE3566]    Collection Time: 10/16/19  1:44 PM   Result Value Ref Range    INR 0.99 0.86 - 1.14   Cardiolipin Thelma IgG and IgM [LAB 6836]    Collection Time: 10/16/19  1:44 PM   Result Value Ref Range    Cardiolipin Antibody IgG 2.0 0.0 - 19.9 GPL-U/mL    Cardiolipin Antibody IgM <0.2 0.0 - 19.9 MPL-U/mL   Lupus Anticoagulant Panel [[id number]]    Collection Time: 10/16/19  1:44 PM   Result Value Ref Range    Lupus Result Negative NEG^Negative   CBC with platelets differential [HJN277]    Collection  Time: 10/16/19  1:44 PM   Result Value Ref Range    WBC 8.2 4.0 - 11.0 10e9/L    RBC Count 3.21 (L) 4.4 - 5.9 10e12/L    Hemoglobin 9.5 (L) 13.3 - 17.7 g/dL    Hematocrit 30.5 (L) 40.0 - 53.0 %    MCV 95 78 - 100 fl    MCH 29.6 26.5 - 33.0 pg    MCHC 31.1 (L) 31.5 - 36.5 g/dL    RDW 13.9 10.0 - 15.0 %    Platelet Count 258 150 - 450 10e9/L    Diff Method Automated Method     % Neutrophils 49.5 %    % Lymphocytes 30.6 %    % Monocytes 8.4 %    % Eosinophils 10.1 %    % Basophils 0.9 %    % Immature Granulocytes 0.5 %    Nucleated RBCs 0 0 /100    Absolute Neutrophil 4.1 1.6 - 8.3 10e9/L    Absolute Lymphocytes 2.5 0.8 - 5.3 10e9/L    Absolute Monocytes 0.7 0.0 - 1.3 10e9/L    Absolute Eosinophils 0.8 (H) 0.0 - 0.7 10e9/L    Absolute Basophils 0.1 0.0 - 0.2 10e9/L    Abs Immature Granulocytes 0.0 0 - 0.4 10e9/L    Absolute Nucleated RBC 0.0    Quantiferon TB Gold Plus    Collection Time: 10/16/19  1:44 PM   Result Value Ref Range    Quantiferon-TB Gold Plus Result Negative NEG^Negative    TB1 Ag minus Nil Value 0.00 IU/mL    TB2 Ag minus Nil Value 0.00 IU/mL    Mitogen minus Nil Result 6.78 IU/mL    Nil Result 0.06 IU/mL   Comprehensive metabolic panel [LAB17]    Collection Time: 10/16/19  1:44 PM   Result Value Ref Range    Sodium 141 133 - 144 mmol/L    Potassium 5.5 (H) 3.4 - 5.3 mmol/L    Chloride 115 (H) 94 - 109 mmol/L    Carbon Dioxide 18 (L) 20 - 32 mmol/L    Anion Gap 8 3 - 14 mmol/L    Glucose 112 (H) 70 - 99 mg/dL    Urea Nitrogen 85 (H) 7 - 30 mg/dL    Creatinine 6.31 (H) 0.66 - 1.25 mg/dL    GFR Estimate 8 (L) >60 mL/min/[1.73_m2]    GFR Estimate If Black 10 (L) >60 mL/min/[1.73_m2]    Calcium 7.9 (L) 8.5 - 10.1 mg/dL    Bilirubin Total 0.2 0.2 - 1.3 mg/dL    Albumin 2.8 (L) 3.4 - 5.0 g/dL    Protein Total 6.2 (L) 6.8 - 8.8 g/dL    Alkaline Phosphatase 100 40 - 150 U/L    ALT 17 0 - 70 U/L    AST 18 0 - 45 U/L   Blood Group A Subtype [[id number]]    Collection Time: 10/16/19  1:44 PM   Result Value Ref Range     Antigen Type Canceled, Test credited     Blood Bank Comment       Patient is not ABO type A or AB. A subtype not applicable 10/16/19 IF   ABO/Rh type and screen    Collection Time: 10/16/19  1:44 PM   Result Value Ref Range    ABO O     RH(D) Pos     Antibody Screen Neg     Test Valid Only At          [facility]    Specimen Expires 10/19/2019    Antibody titer red cell [[id number]]    Collection Time: 10/16/19  1:44 PM   Result Value Ref Range    Antibody Titer       Anti B: IgM 32, IgG 64  Anti A: IgM >256, IgG >256     Factor 2 and 5 mutation analysis    Collection Time: 10/16/19  1:44 PM   Result Value Ref Range    Copath Report       Patient Name: KELVIN ARZATE  MR#: [id number]  Specimen #: [id number]  Collected: 10/16/2019 13:44  Received: 10/17/2019 09:58  Reported: 10/21/2019 16:05  Ordering Phy(s): TREVON ERVIN  Additional Phy(s): DELORES PETIT  Copy To:  Patricia    For improved result formatting, select 'View Enhanced Report Format' under   Linked Documents section.  _________________________________________    TEST(S) REQUESTED:  Factor 5 Leiden and Factor 2 by PCR    SPECIMEN DESCRIPTION:  Blood    METHODOLOGY:   The regions of genomic DNA containing the U0444B Factor V   Leiden gene mutation (Factor V  Leiden) and the Factor 2(Prothrombin C64758G) gene mutation were   simultaneously amplified using the polymerase  chain reaction.  The amplified products were digested with restriction   endonuclease TaqI and products were  analyzed by gel electrophoresis.    RESULTS:    Factor V 1691G>A (Leiden)  RESULTS:  Mutation analyzed:     1691G>A  Factor V 1691G>A (Leiden)  Interpretation:      ABSENT   Factor V 1691G>A (Leiden) mutation  genotype:      G/G    FACTOR 2/PROTHROMBIN RESULTS:  Mutation analyzed:     51815A>A  Factor 2 Mutation Interpretation:      ABSENT  Factor 2 Mutation genotype:      G/G    INTERPRETATION:  The patient is negative for  the Factor V 1691G>A (Leiden) and negative for   the Factor 2 mutation.    COMMENTS:  If a patient is the recipient of an allogeneic bone marrow transplant,   this test must be done on a  pre-transplant sample or buccal swab.  A previous allogeneic bone marrow   transplant will interfere with test  results.  Call the [facility]([phone]) for   instructions on sample collection for these  patients.    This test was developed and its performance characteristics determined by   the [facility],  Molecular Diagnostics Laboratory. It has not been cleared or   approved by the FDA. The laboratory is  regulated under CLIA as qualified to perform high-complexity testing. This   test is used for clin ical purposes.  It should not be regarded as investigational or for research.    A resident/fellow in an accredited training program was involved in the   selection of testing, review of  laboratory data, and/or interpretation of this case.  I, as the senior   physician, attest that I: (i) confirmed  appropriate testing, (ii) examined the relevant raw data for the   specimen(s); and (iii) rendered or confirmed  the interpretation(s).    Electronically Signed Out By:  Carlos Aaron MD    CPT Codes:  A: 98637-O3AOTQ, 70910-I6JEBG, -ILTIRP(2)    TESTING LAB LOCATION:  [address] [address] [phone]    COLLECTION SITE:  Client:  [facility]  Location:  [facility] (B)             Again, thank you for allowing me to participate in the care of your patient.      Sincerely,    LEEANNE

## 2019-10-17 ENCOUNTER — TELEPHONE (OUTPATIENT)
Dept: TRANSPLANT | Facility: CLINIC | Age: 69
End: 2019-10-17

## 2019-10-17 LAB
C PEPTIDE SERPL-MCNC: 8.7 NG/ML (ref 0.9–6.9)
CARDIOLIPIN ANTIBODY IGG: 2 GPL-U/ML (ref 0–19.9)
CARDIOLIPIN ANTIBODY IGM: <0.2 MPL-U/ML (ref 0–19.9)
CMV IGG SERPL QL IA: >8 AI (ref 0–0.8)
EBV VCA IGG SER QL IA: >8 AI (ref 0–0.8)
HBV CORE AB SERPL QL IA: NONREACTIVE
HBV SURFACE AB SERPL IA-ACNC: 0.1 M[IU]/ML
HBV SURFACE AG SERPL QL IA: NONREACTIVE
HCV AB SERPL QL IA: NONREACTIVE
HIV 1+2 AB+HIV1 P24 AG SERPL QL IA: NONREACTIVE
INTERPRETATION ECG - MUSE: NORMAL
T PALLIDUM AB SER QL: NONREACTIVE
THROMBIN TIME: 16.7 SEC (ref 13–19)
VZV IGG SER QL IA: 3.9 AI (ref 0–0.8)

## 2019-10-17 NOTE — TELEPHONE ENCOUNTER
Allison Dotson,    A imaging provider Physician Aranza Rivera MD had his support staff Isabela call transplant coordinator to report CXR ordered by ALEIDA Craig is flagged.       I /Patsy called Nila and spoke directly.  She requests the CXR be sent to PCP and obtain CXR from prior facility to send for comparison.       PCP is Usha Charlton [phone]; Fax: [phone]  Phone [phone] [facility], [address], [facility] [address]      IMPRESSION:   1. Postsurgical changes of sternotomy.  2. Left perihilar opacity. Compare to old films or obtain CT.  [Consider Follow Up: Left perihilar opacity]. Compare to old films or  obtain CT.     This report will be copied to the [facility] to ensure a  provider acknowledges the finding.      I have personally reviewed the examination and initial interpretation  and I agree with the findings.     MD Patsy DAWKINS called Antonette to sign JENS for [facility] CT scan.  Signed JENS needs to be obtained from Antonette before the [facility] can send the CT   Nephrology would like images of Abd CT done 6/18 at [facility].    I called PCP PCP is Usha Charlton [phone]; [phone] My call was sent to triage Line. I left voice message to call back.  I  Fax: [phone] this note to Dr. Charlton.     I called Jeanine Velez [phone] I left voice messge to call back or have Antonette Trevizo call back.     I called JENS to fax the two most recent reports of CXR 6/20/2019 and 9/5/2019 to have CXR compared I LM with Radiology department at [facility]     [phone] Jeanine Velez  I KAIA to call back.     Casandra from Dr. Charlton's office called at 1006 am.  Gave me the fax to send this note. [phone].      Patsy called antonette back and reached him directly.   He is aware PCP got the CXR results and our team request CT.   Antonette VERY concerned about  the CXR results. He would like to see Pulmonary MD in [facility] since ealth are 1 1/2 hours away.  I will bring his request to the meeting on Wednesday 10/23.      Dylon will call Tyrone to get an JENS Signed for [facility].  Patsy to request CT be sent to [facility], Dylon to call Patsy once JENS is on file with Tyrone

## 2019-10-18 LAB
GAMMA INTERFERON BACKGROUND BLD IA-ACNC: 0.06 IU/ML
LA PPP-IMP: NEGATIVE
M TB IFN-G BLD-IMP: NEGATIVE
M TB IFN-G CD4+ BCKGRND COR BLD-ACNC: 6.78 IU/ML
MITOGEN IGNF BCKGRD COR BLD-ACNC: 0 IU/ML
MITOGEN IGNF BCKGRD COR BLD-ACNC: 0 IU/ML

## 2019-10-18 NOTE — PROGRESS NOTES
RE: Dylon Trevizo    [facility]# [id number]        I saw your patient, Dylon Trevizo, in consultation in our pretransplant clinic.  He was at clinic to discuss care for his end-stage renal disease.  He was at clinic with his significant other.  Prior to clinic, they attended our pretransplant class.       We talked about the pros and cons of transplantation vs. dialysis.  We discussed the fact that it was important that he think about the pros and cons of each treatment option and make an active decision.  We also discussed the fact that the two were interconnected and he may need to go on dialysis before transplant (if he chose to have a transplant) and that if the transplant failed, he might need dialysis before another transplant.       We also discussed the fact that if he chose to have a transplant, he would need to decide between going on the wait list for a  donor transplant vs. having a living donor transplant.  We talked about the pros and cons of each option.  Although I didn't express an opinion regarding transplantation or dialysis, I suggested that if he chose to have a transplant, a living donor transplant would be preferable in that the surgery is the same, the immunosuppressive drugs and the risks are the same, but the transplant could be done sooner and the results are better.  I told him that the wait for  donor kidney was approximately five years for patients who are newly put on the waiting list.  In addition, we talked about the fact that the disadvantage of a living donor transplant was the risk to the donor.       Because he was interested in living donation, we spent some time discussing donor risks, including the risks of mortality, morbidity, and long-term risks with a single kidney. I also provided him with our donor DVD to view and share at his leisure.     Finally, I also discussed the new ( donor) kidney (KDPI) scoring system with him.  We discussed the  "advantages and disadvantages of accepting an \"expanded criteria\" donor kidney, and the latest data as to who is potentially benefited by receiving an expanded criteria donor kidney versus waiting longer for a standard criteria donor.     I attempted to answer any remaining questions.  I also told him that should he have any questions, he should feel free to contact us.  We would be glad to answer any questions either over the phone or at another clinic visit.  His transplant coordinator is Patsy Galeana and may be reached at [phone].  Thank you for the opportunity to see him.     I spent 25 minutes with this patient.  Over 90% of that time was spent in counseling and coordination of care.             Yours truly,               Rodrigo Lopez MD         Professor of Surgery         ([phone])    LUCIO/  "

## 2019-10-21 LAB
A* LOCUS: NORMAL
A*: NORMAL
ABTEST METHOD: NORMAL
B* LOCUS: NORMAL
B*: NORMAL
BLD GP AB SCN TITR SERPL: NORMAL {TITER}
BW-1: NORMAL
BW-2: NORMAL
C* LOCUS: NORMAL
C*: NORMAL
COPATH REPORT: NORMAL
DPA1* LOCUS NMDP: NORMAL
DPA1* NMDP: NORMAL
DPA1*: NORMAL
DPA1*LOCUS: NORMAL
DPB1* NMDP: NORMAL
DPB1*: NORMAL
DPB1*LOCUS: NORMAL
DQA1*: NORMAL
DQA1*LOCUS: NORMAL
DQB1* LOCUS: NORMAL
DQB1*: NORMAL
DRB1* LOCUS: NORMAL
DRB1*: NORMAL
DRB4* LOCUS: NORMAL
DRSSO TEST METHOD: NORMAL
PROTOCOL CUTOFF: NORMAL
SA1 CELL: NORMAL
SA1 COMMENTS: NORMAL
SA1 HI RISK ABY: NORMAL
SA1 MOD RISK ABY: NORMAL
SA1 TEST METHOD: NORMAL
SA2 CELL: NORMAL
SA2 COMMENTS: NORMAL
SA2 HI RISK ABY UA: NORMAL
SA2 MOD RISK ABY: NORMAL
SA2 TEST METHOD: NORMAL
UNACCEPTABLE ANTIGEN: NORMAL
UNOS CPRA: 0

## 2019-10-23 ENCOUNTER — COMMITTEE REVIEW (OUTPATIENT)
Dept: TRANSPLANT | Facility: CLINIC | Age: 69
End: 2019-10-23

## 2019-10-23 ENCOUNTER — TELEPHONE (OUTPATIENT)
Dept: PULMONOLOGY | Facility: CLINIC | Age: 69
End: 2019-10-23

## 2019-10-23 ENCOUNTER — TELEPHONE (OUTPATIENT)
Dept: TRANSPLANT | Facility: CLINIC | Age: 69
End: 2019-10-23

## 2019-10-23 DIAGNOSIS — N18.5 CKD (CHRONIC KIDNEY DISEASE), STAGE V (H): ICD-10-CM

## 2019-10-23 DIAGNOSIS — Z76.82 ORGAN TRANSPLANT CANDIDATE: ICD-10-CM

## 2019-10-23 DIAGNOSIS — J45.909 SEVERE ASTHMA: Primary | ICD-10-CM

## 2019-10-23 NOTE — LETTER
2019    Dylon Gascacamilla  [address] [address]        Dear Dylon,    It was a pleasure to see you recently for consideration of kidney transplantation. Your pre-transplant evaluation results were reviewed at our Multidisciplinary Selection Committee. The committee is requesting the following items are completed before determining your candidacy:    1. The team determined not to place name on the  donor Kidney Wait List due requirements and multiple medical issues to include - the Mandatory Social Work evaluation wasn't completed on the evaluation day; - you have severe asthma; - There is an opacity on the Chest xray of 10/16/2019; - The is noted a right carotid artery occlusion;-Transplant team need to review the 2018 CT abd/Pelvis to assess vessel patency for adequacy blood flow before determining patient is candidate.     2.  Transplant team request pulmonary MD at [facility] to assess severe asthma due to  Intubation and tracheostomy within the month.  Mr. Trevizo scheduled Pulmonary referral locally on 2019 with Dr. Sultana [facility].  Mr. Trevizo is willing to see Pulmonary at [facility] if appointment can be arranged prior to 2019.     3. Obtain a Non-Contrast chest CT by PCP     4. Obtain/repeat a carotid Ultrasound ( occlusion of Rt carotid artery )With Vascular referral to f/u the carotid Ultrasound to assess if vascular surgery is needed.    5. Cardiology referral to assess heart for pre operative risk     6. Return to see Social hyacinth Wise to complete evaluation.    Currently you are not on the [employer] Organ [employer] (UNOS) Kidney list in [place] due to you are currently on dialysis.  When the above evaluation items are complete our team will rediscuss for candidacy and use your first date of chronic dialysis as listing date.     We thank you for allowing us to participate in your care.  If you or your providers have questions about this  letter or the transplant evaluation process, you may contact me directly at [phone].  Thank you for your attention to these details.  It is a pleasure working with you.     Sincerely,      Patsy Galeana RN BSN on behalf of the   Solid Organ Kidney Transplant Program  [facility]     CC:  Usha Charlton MD (PCP); Mc Gomez; Marzena Shelby MD

## 2019-10-23 NOTE — COMMITTEE REVIEW
Abdominal Committee Review Note     Evaluation Date: 10/16/2019  Committee Review Date: 10/23/2019    Organ being evaluated for: Kidney    Transplant Phase: Evaluation  Transplant Status: Active    Transplant Coordinator: Patsy Galeana  Transplant Surgeon:   Rodrigo Lopez    Referring Physician: Mc Gomez    Primary Diagnosis: Diabetes Mellitus - Type II  Secondary Diagnosis:     Committee Review Members:  Nephrology Kurt Mcgee, APRN CNP   Nurse Yenny Cuba, RN, Marzena Castillo, RN   Nutrition Giulia Alfaro, RD   Pharmacist Nasir Silvestre, [facility]   Pharmacy Nasir Silvestre, [facility]    - Clinical Abril Gilbert, [facility], Micaelacourtney Pettit, [facility]   Transplant Nila Dickens PA-C, Monisha Lubin, JOSE DE JESUS, Yamileth Hodges, CARLA, Janet Arnold, JOSE DE JESUS, Patsy Galeana, JOSE DE JESUS, Peewee Suarez MD, Janine Cisneros RN, Yahaira Lawson MD, MD       Transplant Eligibility: Irreversible chronic kidney disease treated w/dialysis or expected need for dialysis    Committee Review Decision: Needs Re-presentation    Relative Contraindications: Other, carotid artery occlusion /unknown iliac vessel patency; opacity on chest    Absolute Contraindications: Other, Carotid artery occlusion    Committee Chair Yahaira Lawson MD verbally attested to the committee's decision.    Committee Discussion Details: ALEIDA Kwan presented to committee Not on dialysis eGFR 8.    1. The team determined not to place name on the  donor Kidney Wait List due requirements and multiple medical issues to include - the Mandatory Social Work evaluation wasn't completed on the evaluation day; - you have severe asthma; - There is an opacity on the Chest xray of 10/16/2019; - The is noted a right carotid artery occlusion;-Transplant team need to review the 2018 CT abd/Pelvis to assess vessel patency for adequacy blood flow before determining patient is candidate.   2.   Transplant team request pulmonary MD at [facility] to assess severe asthma due to  Intubation and tracheostomy within the month.  Mr. Trevizo scheduled Pulmonary referral locally on 12/1/2019 with Dr. Sultana [facility].  Mr. Trevizo is willing to see Pulmonary at [facility] if appointment can be arranged prior to 12/1/2019.   3. Obtain a Non-Contrast chest CT by PCP   4. Obtain/repeat a carotid Ultrasound ( occlusion of Rt carotid artery )With Vascular referral to f/u the carotid Ultrasound to assess if vascular surgery is needed.  5. Cardiology referral to assess heart for pre operative risk   7. Return to see Lilibeth Pettit Social work to complete evaluation.        (Per Nila  Order place in [facility]:  at [facility] within the month due to SEVERE Asthma   Patsy DELA CRUZ in transplant office called [phone] to request pulmonary appt ASAP, The admin  in Pulmonary said the first appt is late December or Early January 2020.  I asked the  to write note to RN triage or lung provider to call ALEIDA Craig to discuss urgency of appt and attempt to schedule appt earlier than late December /January 2020. Admin staff staff stated she sent note to Pulmonary staff  RN /provider to page ALEIDA Craig to discuss case.  Pt in NOT on dialysis )(    LUIS MIGUEL/Patsy left voice message Contacted patient to review outcome of selection committee meeting (See selection committee encounter).   Explained to patient that he needs to complete all components of the evaluation to be eligible for active status on the waiting list or to proceed with a live donor kidney transplant.   Reviewed next steps based on outcomes:   Patient will not be listed evaluation is not complete), patient will receive:    - An Evaluation Summary Letter indicating what is needed to complete evaluation-discussed with patient if they would like to have testing done with [facility] or locally  Confirmed with patient that on successful completion of  outstanding components, patient will be reviewed at selection committee for candidacy.

## 2019-10-23 NOTE — TELEPHONE ENCOUNTER
[facility] Call Center    Phone Message    May a detailed message be left on voicemail: yes    Reason for Call: Other: Nila SHIN is requesting Dylon be seen in the next month for severe asthma. She is requesting a call back on the pager number provided to get Dylon scheduled please.      Action Taken: Message routed to:  [facility] ([facility] Pulm

## 2019-10-23 NOTE — TELEPHONE ENCOUNTER
Contacted Nila to let her know that we do not have any new appointments within a month.  I offered to keep pt on our wait list incase we get a cancellation or alternatively, she can call the pulmonary consult service.  Pager number for this week's consult provided.

## 2019-10-23 NOTE — TELEPHONE ENCOUNTER
Kristen Michaels, called to discuss possible donor to Dylon Trevizo, who may be in correctional facilities.   I called Mary back and left voice message.   [phone]

## 2019-10-24 NOTE — TELEPHONE ENCOUNTER
Contacted pt to offer pulmonology appointment on 10/28.  Pt declined appointment, states he has local pulmonologist and he's not willing to travel to [place] for this appointment.  Referring provider updated.

## 2019-11-04 ENCOUNTER — TELEPHONE (OUTPATIENT)
Dept: TRANSPLANT | Facility: CLINIC | Age: 69
End: 2019-11-04

## 2019-11-04 NOTE — TELEPHONE ENCOUNTER
Called patient to schedule Pulm appt & US and he's going to do Pulm appt closer to his home and will do the Cartoid US too.

## 2019-11-07 ENCOUNTER — TELEPHONE (OUTPATIENT)
Dept: TRANSPLANT | Facility: CLINIC | Age: 69
End: 2019-11-07

## 2019-11-07 NOTE — TELEPHONE ENCOUNTER
I /Patsy called Dylon Trevizo and read the items below       1.  Not to place on Wait List due the Social Work evaluation was incomplete; patient has severe asthma; Opacity on the Chest XR 10/16/2019; Right carotid artery occlusion;  Transplant team need 6/2018 CT abd/Pelvis to be pushed to [facility] for surgeon to assess vessel patency.  2.  Transplant team requested he to see pulmonary due to severe asthma with past Intubation and tracheostomy.   3. Obtain a Non-Contrast chest CT by PCP   4. Obtain/repeat a carotid Ultrasound ( occlusion of Rt carotid artery )With Vascular referral to f/u the carotid Ultrasound to assess if vascular surgery is needed.  5. Transplant team request Mr. Trevizo see Pulmonary at [facility] urgently within the month.  Mr. Trevizo requested to do the Pulmonary referral to be done locally.  Mr. Trevizo reports having Pulmonary referral on  12/1/2019 Dr. Sultana [facility] and repeat carotid US locally.  Mr. Trevizo is willing to see Pulmonary at [facility].   6. Cardiology referral to assess 69 yr old male with CKD 5 not on dialysis   7. ZAYRA Wise did not finish her evaluation SW need to see him again     I /Patsy called Emery Charlton MD at 3:45pm 11/7/2019 to assist in getting a Chest CT non contrast and bilateral Carotid Ultrasound   LM with RN triage [phone]    LM with Dr. Charlton [phone]  FAXed this note to [phone]  I asked RN and Dr. Charlton's RN to call back.

## 2019-11-11 ENCOUNTER — TELEPHONE (OUTPATIENT)
Dept: TRANSPLANT | Facility: CLINIC | Age: 69
End: 2019-11-11

## 2019-11-11 NOTE — TELEPHONE ENCOUNTER
Bhavesh called from Dr. Charlton's office called to report they will arrange order  Chest CT and bilateral Carotid US locally on Dylon Santhosh  1950.

## 2019-11-21 ENCOUNTER — TRANSFERRED RECORDS (OUTPATIENT)
Dept: HEALTH INFORMATION MANAGEMENT | Facility: CLINIC | Age: 69
End: 2019-11-21

## 2019-12-19 ENCOUNTER — TEAM CONFERENCE (OUTPATIENT)
Dept: TRANSPLANT | Facility: CLINIC | Age: 69
End: 2019-12-19

## 2019-12-19 NOTE — TELEPHONE ENCOUNTER
Team Conference: Renny   Attendees: [facility]   Coordinators;  Tim Galeana, David Walker Siers, Schammel   Issues:  CT review of outside film 6/28/2018  OK patency but will need new one this year 2020.   Decision:  1. Continue evaluation pt not on Wait list on dialysis   2. Pulmonary Pulm appt [place] 10, 2020 at 10am w/Ronald   3. Vessels okay for kidney either side

## 2020-01-10 ENCOUNTER — PRE VISIT (OUTPATIENT)
Dept: PULMONOLOGY | Facility: CLINIC | Age: 70
End: 2020-01-10

## 2020-01-13 NOTE — TELEPHONE ENCOUNTER
RECORDS RECEIVED FROM: External   DATE RECEIVED: 2.26.2020   NOTES STATUS DETAILS   OFFICE NOTE from referring provider Internal 10.23.19 Nila Dickens   OFFICE NOTE from other specialist N/A    DISCHARGE SUMMARY from hospital N/A    DISCHARGE REPORT from the ER Care Everywhere 6.30.19 6.20.19   MEDICATION LIST Internal    IMAGING  (NEED IMAGES AND REPORTS)     CT SCAN In process 11.21.19 [facility]  6.12.18 [facility], received   CHEST XRAY (CXR) In process 11.21.19 [facility]  10.11.19 [facility]  9.5.19 [facility]-received  6.20.19 [facility]received  5.7.19 [facility]   TESTS     PULMONARY FUNCTION TESTING (PFT) N/A       Action 1.13.20 MJ 11:32 AM   Action Taken Requested images from [facility]. All chest images in the last two years (February 2018-present).   Sent message to call center error email in regards to not scheduling PFT. Not sure if  if from call center, thought I would give it a try.     Action 2.13.20 sv   Action Taken All images in PACS

## 2020-02-18 ENCOUNTER — DOCUMENTATION ONLY (OUTPATIENT)
Dept: CARE COORDINATION | Facility: CLINIC | Age: 70
End: 2020-02-18

## 2020-02-26 ENCOUNTER — PRE VISIT (OUTPATIENT)
Dept: PULMONOLOGY | Facility: CLINIC | Age: 70
End: 2020-02-26

## 2020-02-26 ENCOUNTER — TELEPHONE (OUTPATIENT)
Dept: PULMONOLOGY | Facility: CLINIC | Age: 70
End: 2020-02-26

## 2020-02-26 NOTE — TELEPHONE ENCOUNTER
Called pt to confirm he was coming to his pulmonary appointment today with Dr. Saavedra and to ask if he has had any PFT's done as he was not scheduled for any. Pt stated he canceled this appointment a while ago because he has a pulmonologist closer to his home. I informed him the appointment did not get canceled but he asked that I cancel it now. I cancelled appointment and will route to kidney coordinators.

## 2020-03-24 ENCOUNTER — TELEPHONE (OUTPATIENT)
Dept: TRANSPLANT | Facility: CLINIC | Age: 70
End: 2020-03-24

## 2020-03-24 NOTE — TELEPHONE ENCOUNTER
Pt reports that he saw a pulmonologist and had PFTs done at the [facility] sometime last fall- he was unsure of the date.  We will contact that facility for the clinic dvisit note and PFT results.

## 2021-05-31 NOTE — PROGRESS NOTES
Surgery/Procedure: Right arm avf     Special Equipment: TBD    Location: [facility]    Date: 09/20/19    Time: 12:00pm     Surgeon: Dr. Blank    Assist: tbd    Length of Surgery: 2 hrs     OR Confirmed/ :  Yes with [facility] on 8/28/19    Orders In: No    Provider Team Notified:  Yes    Entered on [employer] Calendar:  Yes    Post Op: anupama stock 10/4/19 DR blank 10/9/19

## 2021-05-31 NOTE — PROGRESS NOTES
VASCULAR SURGERY OUTPATIENT CONSULT OR VISIT   VASCULAR SURGEON: Aye Blank MD    LOCATION:  [facility]    Dylon Trevizo   Medical Record #:  [id number]  YOB: 1950  Age:  68 y.o.     Date of Service: 8/28/2019    PRIMARY CARE PROVIDER: Usha Charlton MD      Reason for consultation: Evaluation for placement of hemodialysis access    IMPRESSION: Patient very anxious and not sure that he wants to proceed but finally agreeing to move forward with fistula placement.  Appears to have adequate veins in the right arm, both basilic and cephalic.  Forearm vein appears small but may dilate after regional block and become adequate for use.  Patient has most likely had central line placements given that he was intubated for a long period of time at [facility].  Unfortunately test to evaluate this would be nephrotoxic.  Right-handed left arm veins appear inadequate in caliber.  Hopes to be approved for transplant and tells us that his son would like to donate a kidney for him.    RECOMMENDATION: Proceed with right arm fistula placement.  We will use regional block.  We will try to see if a dilated forearm pain will allow for a radiocephalic fistula placement despite inadequate vein mapping but will move upper arm fistula placement if this is if not the case.    HPI:  Dylon Trevizo is a 68 y.o. male who was seen today in consultation for fistula placement.  Very anxious and not sure he wants to move forward with this at the start of the visit.  Spent a lot of time discussing what he understood about dialysis access and it became clear that he had had some fairly good discussion that resulted in elimination of peritoneal dialysis as an option.  We discussed why placing a fistula early was a good idea the patient seemed to agree with moving forward.    The patient is right-handed.  He does not smoke.  He had asthma from age 8 and developed COPD over time when he was a smoker.  He had a very  severe COPD exacerbation that resulted in transfer to [facility] where an attempted tracheostomy complication led to need for emergency thoracotomy to ligate active bleeding.  He was in the intensive care for a long period of time.    No other important health issues to discuss.  Patient is going to see the [facility] for evaluation for transplant as well.    No cardiac history per se.  No history of strokes.  No history of peripheral vascular disease.    No other new health issues.    PHH:  No past medical history on file.   No past surgical history on file.    ALLERGIES:  Tramadol; Ace inhibitors; Aspirin; Bactrim [sulfamethoxazole-trimethoprim]; Cozaar [losartan]; Cyanocobalamin (vitamin b12); Folic acid-vit b6-vit b12; Hydralazine; Theophylline; Ceftriaxone; and Clavulanic acid    MEDS:    Current Outpatient Medications:      acetaminophen (TYLENOL) 500 MG tablet, Take 500 mg by mouth every 6 (six) hours as needed for pain., Disp: , Rfl:      albuterol (PROAIR HFA;PROVENTIL HFA;VENTOLIN HFA) 90 mcg/actuation inhaler, Inhale 2 puffs 4 (four) times a day as needed for wheezing., Disp: , Rfl:      albuterol (PROVENTIL) 2.5 mg /3 mL (0.083 %) nebulizer solution, Take 2.5 mg by nebulization every 4 (four) hours as needed for wheezing., Disp: , Rfl:      atorvastatin (LIPITOR) 20 MG tablet, Take 20 mg by mouth at bedtime., Disp: , Rfl:      baclofen (LIORESAL) 10 MG tablet, Take 10 mg by mouth every 8 (eight) hours as needed., Disp: , Rfl:      betamethasone valerate (VALISONE) 0.1 % lotion, Apply 1 application topically 2 (two) times a day as needed (dermatitis)., Disp: , Rfl:      budesonide-formoterol (SYMBICORT) 160-4.5 mcg/actuation inhaler, Inhale 2 puffs 2 (two) times a day., Disp: , Rfl:      cyclobenzaprine (FLEXERIL) 10 MG tablet, Take 1 tablet by mouth at bedtime as needed., Disp: , Rfl:      diclofenac sodium (VOLTAREN) 1 % Gel, Apply 1 application topically 4 (four) times a day as needed.,  Disp: , Rfl:      epoetin abbie (EPOGEN,PROCRIT) 20,000 unit/mL injection, Inject 1 mL (20,000 Units total) under the skin every 30 (thirty) days., Disp: 5 Units, Rfl: 0     fluticasone (FLONASE) 50 mcg/actuation nasal spray, 1 spray into each nostril 2 (two) times a day. Until symptoms improve, Disp: , Rfl:      gabapentin (NEURONTIN) 100 MG capsule, Take 100 mg by mouth at bedtime., Disp: 30 capsule, Rfl: 0     HYDROcodone-acetaminophen 5-325 mg per tablet, Take 1 tablet by mouth every 6 (six) hours as needed for pain., Disp: 30 tablet, Rfl: 0     insulin lispro (HUMALOG) 100 unit/mL injection, Inject 0-10 Units under the skin 4 times a day at 7:30am, 11:30am, 4:30pm, and 9:00pm., Disp: , Rfl:      loratadine (CLARITIN) 10 mg tablet, Take 10 mg by mouth daily., Disp: , Rfl:      metoprolol tartrate (LOPRESSOR) 100 MG tablet, Take 1 tablet (100 mg total) by mouth 2 (two) times a day., Disp: 60 tablet, Rfl: 0     NOVOLOG U-100 INSULIN ASPART 100 unit/mL injection, Check blood sugar four (4) times daily. 11.9 Type 2 without complications, Disp: 10 mL, Rfl: PRN     sodium bicarbonate 325 MG tablet, Take 1 tablet (325 mg total) by mouth 2 (two) times a day. OTC product, Disp: 60 tablet, Rfl: 0     carvedilol (COREG) 6.25 MG tablet, Take 1 tablet by mouth 2 (two) times a day., Disp: , Rfl: 1     ergocalciferol (ERGOCALCIFEROL) 50,000 unit capsule, Take 1 capsule by mouth once a week. For 12 weeks., Disp: , Rfl: 0     LANTUS U-100 INSULIN 100 unit/mL injection, Inject 21 Units under the skin at bedtime. 11.9 Type 2 without complications Contact provider if insulin prescribed is not the preferred insulin per insurance., Disp: 10 mL, Rfl: PRN     polyethylene glycol (MIRALAX) 17 gram packet, Take 1 packet (17 g total) by mouth daily., Disp: , Rfl: 0    SOCIAL HABITS:    Social History     Tobacco Use   Smoking Status Former Smoker     Types: Cigarettes   Smokeless Tobacco Never Used   Tobacco Comment    per significant  "other       Social History     Substance and Sexual Activity   Alcohol Use No    Comment: per significant other       Social History     Substance and Sexual Activity   Drug Use No    Comment: per significant other       FAMILY HISTORY:  No family history on file.    REVIEW OF SYSTEMS:    A 12 point ROS was reviewed and except for what is listed in the HPI above, all others are negative    PE:  /68   Pulse 72   Temp 97.8  F (36.6  C)   Resp 18   Ht 5' 7\" (1.702 m)   Wt (!) 230 lb (104.3 kg)   BMI 36.02 kg/m    Wt Readings from Last 1 Encounters:   08/28/19 (!) 230 lb (104.3 kg)     Body mass index is 36.02 kg/m .    EXAM:  GENERAL: This is a well-developed 68 y.o. male who appears his stated age  EYES: Grossly normal.  MOUTH: Buccal mucosa normal   MUSCULOSKELETAL: Grossly normal and both lower extremities are intact.  HEME/LYMPH: No lymphedema  NEUROLOGIC: Focally intact, Alert and oriented x 3.   PSYCH: appropriate affect  INTEGUMENT: No open lesions or ulcers  Pulse Exam:   Strong radial pulses bilaterally.      DIAGNOSTIC STUDIES:     Images:  Us Vein Mapping For Dialysis Arm Bilateral    Result Date: 8/28/2019  Vein Mapping Ultrasound Upper Extremity Indication: Surveillance for AV Fistula creation Technique: Ultrasound of the Superficial Veins with Compression Maneuvers. Duplex Imaging is performed on the arteries utilizing gray-scale, Two-dimensional images, color-flow imaging, Doppler waveform analysis, and Spectral doppler imaging done. Location Shoulder (mm) Prox Upper Arm (mm) Mid Upper Arm (mm) Distal Upper Arm (mm) Ante- cubital (mm) Prox Forearm (mm) Mid Forearm (mm) Distal Forearm (mm) Right Basilic V.  5.03 5.27 3.31 1.50 3.33 2.25 2.17 Right Cephalic V. 4.21 3.47 3.66 4.07 5.24 4.67 1.62 1.88 Left Basilic V.  4.42 2.47 2.04 2.20 1.66 1.59 1.97 Left Cephalic V. 2.49 2.00 2.16 1.91 1.79 Not seen Not seen  Not seen  Location Right (cm/sec)  Left (cm/sec) Subclavian Artery 124 101 Brachial " Artery 131 102 Radial Artery 48 56 Ulnar Artery 101 85 Impression: Right arm: Adequate upper arm basilic vein and upper arm cephalic vein. Left arm: Inadequate caliber cephalic and basilic veins.      I personally reviewed the images and my interpretation is that the right arm appears to have adequate caliber upper arm cephalic and basilic veins.  The left arm does not have adequate caliber veins at all..    LABS:      Sodium   Date Value Ref Range Status   03/09/2018 144 136 - 145 mmol/L Final   03/08/2018 141 136 - 145 mmol/L Final   03/07/2018 140 136 - 145 mmol/L Final     Potassium   Date Value Ref Range Status   03/09/2018 4.1 3.5 - 5.0 mmol/L Final   03/08/2018 4.0 3.5 - 5.0 mmol/L Final   03/08/2018 4.0 3.5 - 5.0 mmol/L Final     Chloride   Date Value Ref Range Status   03/09/2018 109 (H) 98 - 107 mmol/L Final   03/08/2018 107 98 - 107 mmol/L Final   03/07/2018 107 98 - 107 mmol/L Final     BUN   Date Value Ref Range Status   03/09/2018 34 (H) 8 - 22 mg/dL Final   03/08/2018 40 (H) 8 - 22 mg/dL Final   03/07/2018 48 (H) 8 - 22 mg/dL Final     Creatinine   Date Value Ref Range Status   03/09/2018 3.05 (H) 0.70 - 1.30 mg/dL Final   03/08/2018 3.22 (H) 0.70 - 1.30 mg/dL Final   03/07/2018 3.39 (H) 0.70 - 1.30 mg/dL Final     Hemoglobin   Date Value Ref Range Status   03/08/2018 7.9 (L) 14.0 - 18.0 g/dL Final   03/07/2018 7.6 (L) 14.0 - 18.0 g/dL Final   03/06/2018 8.5 (L) 14.0 - 18.0 g/dL Final     Platelets   Date Value Ref Range Status   03/08/2018 265 140 - 440 thou/uL Final   03/07/2018 223 140 - 440 thou/uL Final   03/06/2018 226 140 - 440 thou/uL Final     INR   Date Value Ref Range Status   02/26/2018 1.08 0.90 - 1.10 Final           Aye Blank MD  VASCULAR SURGERY

## 2021-05-31 NOTE — PROGRESS NOTES
US BEFORE.  CONSULT. Fistula placement. [facility] referring. Has not started dialysis yet.     Scheduled procedure. Reviewed over prep instructions with patient and spouse.   Procedure: Right arm fistula  Date: 9/6/19  Location: [facility]

## 2021-06-01 NOTE — TELEPHONE ENCOUNTER
Writer spoke with pt and he changed his mind and would like to have peritoneal dialysis. Referral sent to general surgery to have pt see Dr. Mcnair. Informed he will probably get a phone call early next week. Please cancel fistula surgery on 9/20/19.

## 2021-06-01 NOTE — TELEPHONE ENCOUNTER
Pt called wondering if he needed to f/u with Dr. Blank because Dr. Britt is performing his surgery. F/U was cancelled with Dr. Blank and he will f/u with Dr. Britt.

## 2021-06-01 NOTE — PROGRESS NOTES
Called and cancelled surgery per request of patient and Mouna BHAKTA spoke with annalisa in [facility] per message patient referred to DR Britt for peritoneal catheter

## 2021-06-01 NOTE — TELEPHONE ENCOUNTER
Dylon called today and left message. He would like to speak with someone regarding his surgery. He has changed his mind about his surgery and what surgery he would like. He does not want to go through his arm for fistula placement now.

## 2021-06-02 NOTE — ANESTHESIA CARE TRANSFER NOTE
Last vitals:   Vitals:    10/11/19 1300   BP: 129/67   Pulse: 60   Resp: 19   Temp:    SpO2: 93%     Patient's level of consciousness is awake  Spontaneous respirations: yes  Maintains airway independently: yes  Dentition unchanged: yes  Oropharynx: oropharynx clear of all foreign objects    QCDR Measures:  ASA# 20 - Surgical Safety Checklist: WHO surgical safety checklist completed prior to induction    PQRS# 430 - Adult PONV Prevention: 4558F - Pt received => 2 anti-emetic agents (different classes) preop & intraop  ASA# 8 - Peds PONV Prevention: NA - Not pediatric patient, not GA or 2 or more risk factors NOT present  PQRS# 424 - Gloria-op Temp Management: 4559F - At least one body temp DOCUMENTED => 35.5C or 95.9F within required timeframe  PQRS# 426 - PACU Transfer Protocol: - Transfer of care checklist used  ASA# 14 - Acute Post-op Pain: ASA14B - Patient did NOT experience pain >= 7 out of 10

## 2021-06-02 NOTE — PROGRESS NOTES
[facility] Consult    HPI:    68 y.o. year old male who I have been consulted by Usha Charlton MD for evaluation of Referral (Consult for ERSD, dialysis port placement. Feels very tired and weak lately. )  She is in the end-stage renal disease and contemplating whether to do hemodialysis versus peritoneal dialysis and she would like to proceed with peritoneal dialysis.  And wants a catheter placed.  She is Barry discussed the vascular components in [place] with vascular gentleman but sounds like she is chosen peritoneal dialysis she does not have a lot of them.  From her nephrologist in the hope that she would get that before making the the surgery date that we be happy to set her up for this.    Allergies:Tramadol; Ace inhibitors; Aspirin; Bactrim [sulfamethoxazole-trimethoprim]; Cozaar [losartan]; Cyanocobalamin (vitamin b12); Folic acid-vit b6-vit b12; Hydralazine; Theophylline; Ceftriaxone; and Clavulanic acid    History reviewed. No pertinent past medical history.    History reviewed. No pertinent surgical history.    CURRENT MEDS:  Current Outpatient Medications on File Prior to Visit   Medication Sig Dispense Refill     acetaminophen (TYLENOL) 500 MG tablet Take 500 mg by mouth every 6 (six) hours as needed for pain.       albuterol (PROAIR HFA;PROVENTIL HFA;VENTOLIN HFA) 90 mcg/actuation inhaler Inhale 2 puffs 4 (four) times a day as needed for wheezing.       albuterol (PROVENTIL) 2.5 mg /3 mL (0.083 %) nebulizer solution Take 2.5 mg by nebulization every 4 (four) hours as needed for wheezing.       amLODIPine (NORVASC) 10 MG tablet amlodipine 10 mg tablet       atorvastatin (LIPITOR) 20 MG tablet Take 20 mg by mouth at bedtime.       baclofen (LIORESAL) 10 MG tablet Take 10 mg by mouth every 8 (eight) hours as needed.       betamethasone valerate (VALISONE) 0.1 % lotion Apply 1 application topically 2 (two) times a day as needed (dermatitis).       budesonide-formoterol (SYMBICORT) 160-4.5  "mcg/actuation inhaler Inhale 2 puffs 2 (two) times a day.       carvedilol (COREG) 6.25 MG tablet Take 1 tablet by mouth 2 (two) times a day.  1     cyclobenzaprine (FLEXERIL) 10 MG tablet Take 1 tablet by mouth at bedtime as needed.       diclofenac sodium (VOLTAREN) 1 % Gel Apply 1 application topically 4 (four) times a day as needed.       epoetin abbie (EPOGEN,PROCRIT) 20,000 unit/mL injection Inject 1 mL (20,000 Units total) under the skin every 30 (thirty) days. 5 Units 0     ergocalciferol (ERGOCALCIFEROL) 50,000 unit capsule Take 1 capsule by mouth once a week. For 12 weeks.  0     fluticasone (FLONASE) 50 mcg/actuation nasal spray 1 spray into each nostril 2 (two) times a day. Until symptoms improve       gabapentin (NEURONTIN) 100 MG capsule Take 100 mg by mouth at bedtime. 30 capsule 0     HYDROcodone-acetaminophen 5-325 mg per tablet Take 1 tablet by mouth every 6 (six) hours as needed for pain. 30 tablet 0     insulin lispro (HUMALOG) 100 unit/mL injection Inject 0-10 Units under the skin 4 times a day at 7:30am, 11:30am, 4:30pm, and 9:00pm.       ipratropium-albuterol (DUO-NEB) 0.5-2.5 mg/3 mL nebulizer 3 mL.       LANTUS U-100 INSULIN 100 unit/mL injection Inject 21 Units under the skin at bedtime. 11.9 Type 2 without complications  Contact provider if insulin prescribed is not the preferred insulin per insurance. 10 mL PRN     loratadine (CLARITIN) 10 mg tablet Take 10 mg by mouth daily.       metoprolol tartrate (LOPRESSOR) 100 MG tablet Take 1 tablet (100 mg total) by mouth 2 (two) times a day. 60 tablet 0     NOVOLOG U-100 INSULIN ASPART 100 unit/mL injection Check blood sugar four (4) times daily.  11.9 Type 2 without complications 10 mL PRN     PARoxetine (PAXIL) 20 MG tablet paroxetine 20 mg tablet       pen needle, diabetic 32 gauge x 5/32\" Ndle Use to inject insulin 4 times daily and as directed DX E 11.65       polyethylene glycol (MIRALAX) 17 gram packet Take 1 packet (17 g total) by mouth " "daily.  0     sevelamer carbonate (RENVELA) 800 mg tablet sevelamer carbonate 800 mg tablet       sodium bicarbonate 325 MG tablet Take 1 tablet (325 mg total) by mouth 2 (two) times a day. OTC product 60 tablet 0     SPS, WITH SORBITOL, 15-20 gram/60 mL Susp suspension 3 (three) times a day as needed.  0     No current facility-administered medications on file prior to visit.        History reviewed. No pertinent family history.     reports that he has quit smoking. His smoking use included cigarettes. He has never used smokeless tobacco. He reports that he does not drink alcohol or use drugs.    Review of Systems:  Negative except numbness increasing tiredness gaining weight, Otherwise twelve system of review is negative.      OBJECTIVE:  Vitals:    09/27/19 1249   BP: 128/70   Patient Site: Right Arm   Patient Position: Sitting   Cuff Size: Adult Large   Pulse: 71   SpO2: 94%   Weight: (!) 228 lb (103.4 kg)   Height: 5' 7\" (1.702 m)     Body mass index is 35.71 kg/m .    EXAM:  GENERAL: This is a well-developed 68 y.o. male who appears his stated age  HEAD: normocephalic  HEENT: Pupils equal and reactive bilaterally  MOUTH: mucus membranes intact  CARDIAC: RRR without murmur  CHEST/LUNG:  Clear to auscultation  ABDOMEN: Soft, nontender, nondistended, no masses  EXTREMITIES: Grossly normal, warm,   NEUROLOGIC: Focally intact, nonfocal  INTEGUMENT: No open lesions or ulcers  VASCULAR: Pulses intact, symmetrical upper and lower extremities.        LABS:  Lab Results   Component Value Date    WBC 5.5 03/08/2018    HGB 7.9 (L) 03/08/2018    HCT 24.9 (L) 03/08/2018    MCV 89 03/08/2018     03/08/2018     INR/Prothrombin Time      Lab Results   Component Value Date    HGBA1C 8.6 (H) 02/18/2018     Lab Results   Component Value Date    ALT 21 02/23/2018    AST 30 02/23/2018    ALKPHOS 58 02/23/2018    BILITOT 0.4 02/23/2018            Assessment/Plan:   1. End stage renal disease (H)  Patient has been discussions " about hemodialysis but at this time point wants to proceed with peritoneal dialysis.  Happy to help out with this 1 of the problems that was there is not a lot of discussion directly with the nephrologist.  So I filled and information about how how we could do this and what to set up I told her that they needed actually had to sit down have a discussion with the nephrologist about how this will be set up from their standpoint.  Surgical standpoint this is a repeat basic procedure will use laparoscopy place a tube into the pelvis to make sure that it has good flush and draw.  This operation was done under general anesthesia and using a small lap scopic incisions and usually as mentioned takes may be 15 to 20 minutes to do.  Risks are infection bleeding catheter not working and the patient understands wished to proceed and would be happy to set this up in the very near future.  - Verify informed consent for procedure; Standing  - Verify informed consent for Anesthesia; Standing  - Skin prep - Clip hair as needed; Standing  - Apply 2% Chlorhexidine Gluconate cloth (Shan Cloth) to surgical area.; Standing  - Insert and maintain IV; Standing  - sodium chloride bacteriostatic 0.9 % injection 0.1-0.3 mL  - sodium chloride flush 3 mL (NS)  - NaCl 0.9% IR 0.9 % irrigation solution 1,000 mL (NS)  - Case Request: INSERTION, CATHETER, PERITONEAL, LAPAROSCOPIC; Standing  - Place sequential compression device; Standing  - vancomycin 1,500 mg in sodium chloride 0.9% 250 mL (VANCOCIN)  - Case Request: INSERTION, CATHETER, PERITONEAL, LAPAROSCOPIC      No follow-ups on file.     Barrie Britt MD  [facility] Department of Surgery

## 2021-06-02 NOTE — ANESTHESIA PREPROCEDURE EVALUATION
Anesthesia Evaluation        Airway   Mallampati: II  Neck ROM: full   Pulmonary    (+) asthma  wheezes,                          Cardiovascular   Rhythm: regular  Rate: normal,      ROS comment: ECHO 2018:    1.Left ventricle ejection fraction is normal. The estimated left ventricular ejection fraction is 65%.    2.TAPSE is normal, which is consistent with normal right ventricular systolic function.    3.No hemodynamically significant valvular heart abnormalities.    4.Poor images available for analysis.    5.No previous study for comparison.     Neuro/Psych      Endo/Other    (+) diabetes mellitus,      GI/Hepatic/Renal    (+)   chronic renal disease ESRD,           Dental                         Anesthesia Plan  Planned anesthetic: general endotracheal  Pre-op: tylenol  Intra-op: 10mg of ketamine prior to incision, fentanyl prn. Will use fiberoptic and glidescope to intubate. Patient with previous history of pulmonary hemorrhage. No intubations since. Have 7.0 tube loaded on fiberoptic.  ASA 4   Induction: intravenous   Anesthetic plan and risks discussed with: patient    Post-op plan: routine recovery

## 2021-06-02 NOTE — ANESTHESIA POSTPROCEDURE EVALUATION
Patient: Dylon Trevizo  INSERTION, CATHETER, PERITONEAL, LAPAROSCOPIC, LYSIS OF ADHESIONS  Anesthesia type: general    Patient location: PACU  Last vitals:   Vitals Value Taken Time   /68 10/11/2019  2:30 PM   Temp 36.8  C (98.2  F) 10/11/2019  2:00 PM   Pulse 63 10/11/2019  2:56 PM   Resp 16 10/11/2019  2:00 PM   SpO2 95 % 10/11/2019  2:56 PM   Vitals shown include unvalidated device data.  Post vital signs: stable  Level of consciousness: alert and conversant  Post-anesthesia pain: pain controlled  Post-anesthesia nausea and vomiting: no  Pulmonary: room air  Cardiovascular: stable and blood pressure at baseline  Hydration: adequate  Anesthetic events: no    QCDR Measures:  ASA# 11 - Gloria-op Cardiac Arrest: ASA11B - Patient did NOT experience unanticipated cardiac arrest  ASA# 12 - Gloria-op Mortality Rate: ASA12B - Patient did NOT die  ASA# 13 - PACU Re-Intubation Rate: ASA13B - Patient did NOT require a new airway mgmt  ASA# 10 - Composite Anes Safety: ASA10A - No serious adverse event    Additional Notes:

## 2021-06-03 VITALS
DIASTOLIC BLOOD PRESSURE: 70 MMHG | SYSTOLIC BLOOD PRESSURE: 128 MMHG | HEART RATE: 71 BPM | BODY MASS INDEX: 35.79 KG/M2 | WEIGHT: 228 LBS | HEIGHT: 67 IN | OXYGEN SATURATION: 94 %

## 2021-06-03 VITALS — WEIGHT: 230 LBS | HEIGHT: 67 IN | BODY MASS INDEX: 36.1 KG/M2

## 2021-06-17 NOTE — PATIENT INSTRUCTIONS - HE
Patient Instructions by Matty Jauregui RN at 8/28/2019 11:00 AM     Author: Matty Jauregui RN Service: -- Author Type: Registered Nurse    Filed: 8/28/2019 12:07 PM Encounter Date: 8/28/2019 Status: Signed    : Matty Jauregui RN (Registered Nurse)    Related Notes: Original Note by Matty Jauregui RN (Registered Nurse) filed at 8/28/2019 12:05 PM       You have been scheduled for the following procedure:    Procedure: Right arm fistula  Date: 9/6/19  Location: [facility]      If you have any questions regarding your procedure, your instructions or should you need to reschedule or cancel your procedure, please contact MIKE at the [facility].    Creating a Hemodialysis Access    Before hemodialysis can be done, a way for blood to leave and return to your body (an access) is needed. During hemodialysis, needles placed into the access carry blood to and from the dialyzer. A hemodialysis access is usually created in your arm. The 2 main types of accesses are an arteriovenous fistula (AV fistula) and an arteriovenous graft (AV graft).  Creating your access  The hemodialysis access provides a large volume of rapidly flowing blood. It involves a surgical operation under anesthesia. You may be able to go home the same day. It is made during a short procedure using one of these methods:    A fistula is made by connecting an artery to a nearby vein. The high pressure and blood flow in the artery are transferred into the vein and help it grow in size and thickness. This enlarged vein (fistula) eventually has high blood flow and is thick enough for needles to be placed safely several times each week during hemodialysis. It may need weeks or months to develop before it's ready to be used. A fistula is more efficient than the graft and has fewer long-term problems. Therefore, it is the preferred form of access.    A graft (piece of synthetic tube) may be sewn between an artery and a vein if a fistula  is not possible because of the small size of your veins. Blood flows rapidly through the graft from the artery to the vein. A graft is usually ready to use in a few weeks. Needles can be placed into the plastic tube to obtain blood during dialysis.  Both types of access may take weeks to months before they can be used. If dialysis is necessary immediately, a temporary venous catheter is used. A catheter that allows two way blood flow is placed into a large vein and the dialysis tubing is connected to the catheter. If both the AV fistula and graft are unsuccessful, a more permanent venous catheter is used.  The most common complications for hemodialysis access are infection, clotting and decreased blood flow from clotting or other narrowing.  Date Last Reviewed: 1/1/2017 2000-2017 [employer]. [address] [place] [address], [place] [address]. All rights reserved. This information is not intended as a substitute for professional medical care. Always follow your healthcare professional's instructions.

## 2021-06-19 NOTE — LETTER
Letter by Barrie Britt MD at      Author: Barrie Britt MD Service: -- Author Type: --    Filed:  Encounter Date: 9/27/2019 Status: Signed         Aye Blank MD  [address] [facility] [address]                                  October 7, 2019    Patient: Dylon Trevizo   MR Number: [id number]   YOB: 1950   Date of Visit: 9/27/2019     Dear Dr. Rosamaria MD:    Thank you for referring Dylon Trevizo to me for evaluation. Below are the relevant portions of my assessment and plan of care.    If you have questions, please do not hesitate to call me. I look forward to following Dylon along with you.    Sincerely,        MD GAUTAM Lopes MD Shannon E Doyle, MD Kyle L Wahlstrom, MD Wahlstrom, Kyle L, MD  10/7/2019  6:16 PM  Sign when Signing Visit  [facility] Consult    HPI:    68 y.o. year old male who I have been consulted by Usha Charlton MD for evaluation of Referral (Consult for ERSD, dialysis port placement. Feels very tired and weak lately. )  She is in the end-stage renal disease and contemplating whether to do hemodialysis versus peritoneal dialysis and she would like to proceed with peritoneal dialysis.  And wants a catheter placed.  She is Barry discussed the vascular components in [place] with vascular gentleman but sounds like she is chosen peritoneal dialysis she does not have a lot of them.  From her nephrologist in the hope that she would get that before making the the surgery date that we be happy to set her up for this.    Allergies:Tramadol; Ace inhibitors; Aspirin; Bactrim [sulfamethoxazole-trimethoprim]; Cozaar [losartan]; Cyanocobalamin (vitamin b12); Folic acid-vit b6-vit b12; Hydralazine; Theophylline; Ceftriaxone; and Clavulanic acid    History reviewed. No pertinent past medical history.    History reviewed. No pertinent surgical history.    CURRENT MEDS:  Current Outpatient Medications on File Prior to Visit    Medication Sig Dispense Refill   ? acetaminophen (TYLENOL) 500 MG tablet Take 500 mg by mouth every 6 (six) hours as needed for pain.     ? albuterol (PROAIR HFA;PROVENTIL HFA;VENTOLIN HFA) 90 mcg/actuation inhaler Inhale 2 puffs 4 (four) times a day as needed for wheezing.     ? albuterol (PROVENTIL) 2.5 mg /3 mL (0.083 %) nebulizer solution Take 2.5 mg by nebulization every 4 (four) hours as needed for wheezing.     ? amLODIPine (NORVASC) 10 MG tablet amlodipine 10 mg tablet     ? atorvastatin (LIPITOR) 20 MG tablet Take 20 mg by mouth at bedtime.     ? baclofen (LIORESAL) 10 MG tablet Take 10 mg by mouth every 8 (eight) hours as needed.     ? betamethasone valerate (VALISONE) 0.1 % lotion Apply 1 application topically 2 (two) times a day as needed (dermatitis).     ? budesonide-formoterol (SYMBICORT) 160-4.5 mcg/actuation inhaler Inhale 2 puffs 2 (two) times a day.     ? carvedilol (COREG) 6.25 MG tablet Take 1 tablet by mouth 2 (two) times a day.  1   ? cyclobenzaprine (FLEXERIL) 10 MG tablet Take 1 tablet by mouth at bedtime as needed.     ? diclofenac sodium (VOLTAREN) 1 % Gel Apply 1 application topically 4 (four) times a day as needed.     ? epoetin abbie (EPOGEN,PROCRIT) 20,000 unit/mL injection Inject 1 mL (20,000 Units total) under the skin every 30 (thirty) days. 5 Units 0   ? ergocalciferol (ERGOCALCIFEROL) 50,000 unit capsule Take 1 capsule by mouth once a week. For 12 weeks.  0   ? fluticasone (FLONASE) 50 mcg/actuation nasal spray 1 spray into each nostril 2 (two) times a day. Until symptoms improve     ? gabapentin (NEURONTIN) 100 MG capsule Take 100 mg by mouth at bedtime. 30 capsule 0   ? HYDROcodone-acetaminophen 5-325 mg per tablet Take 1 tablet by mouth every 6 (six) hours as needed for pain. 30 tablet 0   ? insulin lispro (HUMALOG) 100 unit/mL injection Inject 0-10 Units under the skin 4 times a day at 7:30am, 11:30am, 4:30pm, and 9:00pm.     ? ipratropium-albuterol (DUO-NEB) 0.5-2.5 mg/3 mL  "nebulizer 3 mL.     ? LANTUS U-100 INSULIN 100 unit/mL injection Inject 21 Units under the skin at bedtime. 11.9 Type 2 without complications  Contact provider if insulin prescribed is not the preferred insulin per insurance. 10 mL PRN   ? loratadine (CLARITIN) 10 mg tablet Take 10 mg by mouth daily.     ? metoprolol tartrate (LOPRESSOR) 100 MG tablet Take 1 tablet (100 mg total) by mouth 2 (two) times a day. 60 tablet 0   ? NOVOLOG U-100 INSULIN ASPART 100 unit/mL injection Check blood sugar four (4) times daily.  11.9 Type 2 without complications 10 mL PRN   ? PARoxetine (PAXIL) 20 MG tablet paroxetine 20 mg tablet     ? pen needle, diabetic 32 gauge x 5/32\" Ndle Use to inject insulin 4 times daily and as directed DX E 11.65     ? polyethylene glycol (MIRALAX) 17 gram packet Take 1 packet (17 g total) by mouth daily.  0   ? sevelamer carbonate (RENVELA) 800 mg tablet sevelamer carbonate 800 mg tablet     ? sodium bicarbonate 325 MG tablet Take 1 tablet (325 mg total) by mouth 2 (two) times a day. OTC product 60 tablet 0   ? SPS, WITH SORBITOL, 15-20 gram/60 mL Susp suspension 3 (three) times a day as needed.  0     No current facility-administered medications on file prior to visit.        History reviewed. No pertinent family history.     reports that he has quit smoking. His smoking use included cigarettes. He has never used smokeless tobacco. He reports that he does not drink alcohol or use drugs.    Review of Systems:  Negative except numbness increasing tiredness gaining weight, Otherwise twelve system of review is negative.      OBJECTIVE:  Vitals:    09/27/19 1249   BP: 128/70   Patient Site: Right Arm   Patient Position: Sitting   Cuff Size: Adult Large   Pulse: 71   SpO2: 94%   Weight: (!) 228 lb (103.4 kg)   Height: 5' 7\" (1.702 m)     Body mass index is 35.71 kg/m .    EXAM:  GENERAL: This is a well-developed 68 y.o. male who appears his stated age  HEAD: normocephalic  HEENT: Pupils equal and reactive " bilaterally  MOUTH: mucus membranes intact  CARDIAC: RRR without murmur  CHEST/LUNG:  Clear to auscultation  ABDOMEN: Soft, nontender, nondistended, no masses  EXTREMITIES: Grossly normal, warm,   NEUROLOGIC: Focally intact, nonfocal  INTEGUMENT: No open lesions or ulcers  VASCULAR: Pulses intact, symmetrical upper and lower extremities.        LABS:  Lab Results   Component Value Date    WBC 5.5 03/08/2018    HGB 7.9 (L) 03/08/2018    HCT 24.9 (L) 03/08/2018    MCV 89 03/08/2018     03/08/2018     INR/Prothrombin Time      Lab Results   Component Value Date    HGBA1C 8.6 (H) 02/18/2018     Lab Results   Component Value Date    ALT 21 02/23/2018    AST 30 02/23/2018    ALKPHOS 58 02/23/2018    BILITOT 0.4 02/23/2018            Assessment/Plan:   1. End stage renal disease (H)  Patient has been discussions about hemodialysis but at this time point wants to proceed with peritoneal dialysis.  Happy to help out with this 1 of the problems that was there is not a lot of discussion directly with the nephrologist.  So I filled and information about how how we could do this and what to set up I told her that they needed actually had to sit down have a discussion with the nephrologist about how this will be set up from their standpoint.  Surgical standpoint this is a repeat basic procedure will use laparoscopy place a tube into the pelvis to make sure that it has good flush and draw.  This operation was done under general anesthesia and using a small lap scopic incisions and usually as mentioned takes may be 15 to 20 minutes to do.  Risks are infection bleeding catheter not working and the patient understands wished to proceed and would be happy to set this up in the very near future.  - Verify informed consent for procedure; Standing  - Verify informed consent for Anesthesia; Standing  - Skin prep - Clip hair as needed; Standing  - Apply 2% Chlorhexidine Gluconate cloth (Shan Cloth) to surgical area.; Standing  -  Insert and maintain IV; Standing  - sodium chloride bacteriostatic 0.9 % injection 0.1-0.3 mL  - sodium chloride flush 3 mL (NS)  - NaCl 0.9% IR 0.9 % irrigation solution 1,000 mL (NS)  - Case Request: INSERTION, CATHETER, PERITONEAL, LAPAROSCOPIC; Standing  - Place sequential compression device; Standing  - vancomycin 1,500 mg in sodium chloride 0.9% 250 mL (VANCOCIN)  - Case Request: INSERTION, CATHETER, PERITONEAL, LAPAROSCOPIC      No follow-ups on file.     Barrie Britt MD  [facility] Department of Surgery

## 2021-08-18 ENCOUNTER — TRANSFERRED RECORDS (OUTPATIENT)
Dept: HEALTH INFORMATION MANAGEMENT | Facility: CLINIC | Age: 71
End: 2021-08-18

## 2021-08-18 LAB
CREATININE (EXTERNAL): 4.83 MG/DL (ref 0.72–1.25)
GFR ESTIMATED (EXTERNAL): 12 ML/MIN/1.73M2
GFR ESTIMATED (IF AFRICAN AMERICAN) (EXTERNAL): 15 ML/MIN/1.73M2
GLUCOSE (EXTERNAL): 46 MG/DL (ref 70–100)
POTASSIUM (EXTERNAL): 5.6 MMOL/L (ref 3.5–5.1)

## 2021-08-20 ENCOUNTER — HOSPITAL ENCOUNTER (INPATIENT)
Facility: HOSPITAL | Age: 71
LOS: 7 days | Discharge: HOME | DRG: 907 | End: 2021-08-28
Attending: EMERGENCY MEDICINE | Admitting: FAMILY MEDICINE
Payer: MEDICARE

## 2021-08-20 ENCOUNTER — APPOINTMENT (OUTPATIENT)
Dept: RADIOLOGY | Facility: HOSPITAL | Age: 71
DRG: 907 | End: 2021-08-20
Attending: EMERGENCY MEDICINE
Payer: MEDICARE

## 2021-08-20 DIAGNOSIS — J45.50 POORLY CONTROLLED SEVERE PERSISTENT ASTHMA WITHOUT COMPLICATION (H): ICD-10-CM

## 2021-08-20 DIAGNOSIS — A41.9 SEPSIS WITHOUT ACUTE ORGAN DYSFUNCTION, DUE TO UNSPECIFIED ORGANISM (H): ICD-10-CM

## 2021-08-20 DIAGNOSIS — D72.829 LEUKOCYTOSIS, UNSPECIFIED TYPE: ICD-10-CM

## 2021-08-20 DIAGNOSIS — Z99.2 END-STAGE RENAL DISEASE ON HEMODIALYSIS (H): ICD-10-CM

## 2021-08-20 DIAGNOSIS — R00.0 SINUS TACHYCARDIA: ICD-10-CM

## 2021-08-20 DIAGNOSIS — R50.9 FEVER, UNSPECIFIED FEVER CAUSE: ICD-10-CM

## 2021-08-20 DIAGNOSIS — G89.18 ACUTE POST-OPERATIVE PAIN: Primary | ICD-10-CM

## 2021-08-20 DIAGNOSIS — N18.6 END-STAGE RENAL DISEASE ON HEMODIALYSIS (H): ICD-10-CM

## 2021-08-20 LAB
ALBUMIN SERPL-MCNC: 3.2 G/DL (ref 3.5–5)
ALP SERPL-CCNC: 70 U/L (ref 45–120)
ALT SERPL W P-5'-P-CCNC: <9 U/L (ref 0–45)
ANION GAP SERPL CALCULATED.3IONS-SCNC: 11 MMOL/L (ref 5–18)
AST SERPL W P-5'-P-CCNC: 14 U/L (ref 0–40)
BASE EXCESS BLDV CALC-SCNC: 3 MMOL/L
BASOPHILS # BLD AUTO: 0 10E3/UL (ref 0–0.2)
BASOPHILS NFR BLD AUTO: 0 %
BILIRUB SERPL-MCNC: 0.6 MG/DL (ref 0–1)
BUN SERPL-MCNC: 39 MG/DL (ref 8–22)
CALCIUM SERPL-MCNC: 8.6 MG/DL (ref 8.5–10.5)
CHLORIDE BLD-SCNC: 104 MMOL/L (ref 98–107)
CO2 SERPL-SCNC: 23 MMOL/L (ref 22–31)
CREAT SERPL-MCNC: 5.32 MG/DL (ref 0.7–1.3)
EOSINOPHIL # BLD AUTO: 0 10E3/UL (ref 0–0.7)
EOSINOPHIL NFR BLD AUTO: 0 %
ERYTHROCYTE [DISTWIDTH] IN BLOOD BY AUTOMATED COUNT: 15.1 % (ref 10–15)
GFR SERPL CREATININE-BSD FRML MDRD: 10 ML/MIN/1.73M2
GLUCOSE BLD-MCNC: 117 MG/DL (ref 70–125)
HCO3 BLDV-SCNC: 26 MMOL/L (ref 24–30)
HCT VFR BLD AUTO: 36.8 % (ref 40–53)
HGB BLD-MCNC: 11.5 G/DL (ref 13.3–17.7)
HOLD SPECIMEN: NORMAL
IMM GRANULOCYTES # BLD: 0.1 10E3/UL
IMM GRANULOCYTES NFR BLD: 1 %
LACTATE SERPL-SCNC: 1.1 MMOL/L (ref 0.7–2)
LIPASE SERPL-CCNC: 41 U/L (ref 0–52)
LYMPHOCYTES # BLD AUTO: 2 10E3/UL (ref 0.8–5.3)
LYMPHOCYTES NFR BLD AUTO: 11 %
MCH RBC QN AUTO: 29.8 PG (ref 26.5–33)
MCHC RBC AUTO-ENTMCNC: 31.3 G/DL (ref 31.5–36.5)
MCV RBC AUTO: 95 FL (ref 78–100)
MONOCYTES # BLD AUTO: 1.5 10E3/UL (ref 0–1.3)
MONOCYTES NFR BLD AUTO: 9 %
NEUTROPHILS # BLD AUTO: 14.1 10E3/UL (ref 1.6–8.3)
NEUTROPHILS NFR BLD AUTO: 79 %
NRBC # BLD AUTO: 0 10E3/UL
NRBC BLD AUTO-RTO: 0 /100
OXYHGB MFR BLDV: 69.6 % (ref 70–75)
PCO2 BLDV: 40 MM HG (ref 35–50)
PH BLDV: 7.44 [PH] (ref 7.35–7.45)
PLATELET # BLD AUTO: 225 10E3/UL (ref 150–450)
PO2 BLDV: 36 MM HG (ref 25–47)
POTASSIUM BLD-SCNC: 4.4 MMOL/L (ref 3.5–5)
PROT SERPL-MCNC: 6.9 G/DL (ref 6–8)
RBC # BLD AUTO: 3.86 10E6/UL (ref 4.4–5.9)
SAO2 % BLDV: 70.9 % (ref 70–75)
SARS-COV-2 RNA RESP QL NAA+PROBE: NEGATIVE
SODIUM SERPL-SCNC: 138 MMOL/L (ref 136–145)
WBC # BLD AUTO: 17.6 10E3/UL (ref 4–11)

## 2021-08-20 PROCEDURE — 87070 CULTURE OTHR SPECIMN AEROBIC: CPT | Performed by: EMERGENCY MEDICINE

## 2021-08-20 PROCEDURE — 36415 COLL VENOUS BLD VENIPUNCTURE: CPT | Performed by: EMERGENCY MEDICINE

## 2021-08-20 PROCEDURE — 87040 BLOOD CULTURE FOR BACTERIA: CPT | Performed by: EMERGENCY MEDICINE

## 2021-08-20 PROCEDURE — 83880 ASSAY OF NATRIURETIC PEPTIDE: CPT | Performed by: EMERGENCY MEDICINE

## 2021-08-20 PROCEDURE — 94640 AIRWAY INHALATION TREATMENT: CPT

## 2021-08-20 PROCEDURE — 83605 ASSAY OF LACTIC ACID: CPT | Performed by: EMERGENCY MEDICINE

## 2021-08-20 PROCEDURE — 85730 THROMBOPLASTIN TIME PARTIAL: CPT | Performed by: EMERGENCY MEDICINE

## 2021-08-20 PROCEDURE — 93005 ELECTROCARDIOGRAM TRACING: CPT | Performed by: EMERGENCY MEDICINE

## 2021-08-20 PROCEDURE — 82040 ASSAY OF SERUM ALBUMIN: CPT | Performed by: EMERGENCY MEDICINE

## 2021-08-20 PROCEDURE — 250N000013 HC RX MED GY IP 250 OP 250 PS 637: Performed by: EMERGENCY MEDICINE

## 2021-08-20 PROCEDURE — 84443 ASSAY THYROID STIM HORMONE: CPT | Performed by: FAMILY MEDICINE

## 2021-08-20 PROCEDURE — 85610 PROTHROMBIN TIME: CPT | Performed by: EMERGENCY MEDICINE

## 2021-08-20 PROCEDURE — 71045 X-RAY EXAM CHEST 1 VIEW: CPT

## 2021-08-20 PROCEDURE — 83690 ASSAY OF LIPASE: CPT | Performed by: EMERGENCY MEDICINE

## 2021-08-20 PROCEDURE — 85025 COMPLETE CBC W/AUTO DIFF WBC: CPT | Performed by: EMERGENCY MEDICINE

## 2021-08-20 PROCEDURE — C9803 HOPD COVID-19 SPEC COLLECT: HCPCS

## 2021-08-20 PROCEDURE — 258N000003 HC RX IP 258 OP 636: Performed by: EMERGENCY MEDICINE

## 2021-08-20 PROCEDURE — 82805 BLOOD GASES W/O2 SATURATION: CPT | Performed by: EMERGENCY MEDICINE

## 2021-08-20 PROCEDURE — 84450 TRANSFERASE (AST) (SGOT): CPT | Performed by: FAMILY MEDICINE

## 2021-08-20 PROCEDURE — 250N000011 HC RX IP 250 OP 636: Performed by: EMERGENCY MEDICINE

## 2021-08-20 PROCEDURE — 99285 EMERGENCY DEPT VISIT HI MDM: CPT | Mod: 25

## 2021-08-20 PROCEDURE — 96365 THER/PROPH/DIAG IV INF INIT: CPT | Mod: 59

## 2021-08-20 PROCEDURE — 84155 ASSAY OF PROTEIN SERUM: CPT | Performed by: FAMILY MEDICINE

## 2021-08-20 PROCEDURE — 84484 ASSAY OF TROPONIN QUANT: CPT | Performed by: EMERGENCY MEDICINE

## 2021-08-20 PROCEDURE — 87635 SARS-COV-2 COVID-19 AMP PRB: CPT | Performed by: EMERGENCY MEDICINE

## 2021-08-20 PROCEDURE — 82010 KETONE BODYS QUAN: CPT | Performed by: EMERGENCY MEDICINE

## 2021-08-20 PROCEDURE — 87205 SMEAR GRAM STAIN: CPT | Performed by: EMERGENCY MEDICINE

## 2021-08-20 RX ORDER — ACETAMINOPHEN 325 MG/1
975 TABLET ORAL ONCE
Status: COMPLETED | OUTPATIENT
Start: 2021-08-20 | End: 2021-08-20

## 2021-08-20 RX ORDER — ALBUTEROL SULFATE 90 UG/1
6 AEROSOL, METERED RESPIRATORY (INHALATION) ONCE
Status: COMPLETED | OUTPATIENT
Start: 2021-08-21 | End: 2021-08-20

## 2021-08-20 RX ORDER — PIPERACILLIN SODIUM, TAZOBACTAM SODIUM 3; .375 G/15ML; G/15ML
3.38 INJECTION, POWDER, LYOPHILIZED, FOR SOLUTION INTRAVENOUS ONCE
Status: COMPLETED | OUTPATIENT
Start: 2021-08-20 | End: 2021-08-20

## 2021-08-20 RX ADMIN — PIPERACILLIN SODIUM AND TAZOBACTAM SODIUM 3.38 G: 3; .375 INJECTION, POWDER, LYOPHILIZED, FOR SOLUTION INTRAVENOUS at 23:06

## 2021-08-20 RX ADMIN — SODIUM CHLORIDE 500 ML: 9 INJECTION, SOLUTION INTRAVENOUS at 22:37

## 2021-08-20 RX ADMIN — ALBUTEROL SULFATE 6 PUFF: 90 AEROSOL, METERED RESPIRATORY (INHALATION) at 23:39

## 2021-08-20 RX ADMIN — ACETAMINOPHEN 975 MG: 325 TABLET ORAL at 22:30

## 2021-08-21 ENCOUNTER — APPOINTMENT (OUTPATIENT)
Dept: CT IMAGING | Facility: HOSPITAL | Age: 71
DRG: 907 | End: 2021-08-21
Attending: EMERGENCY MEDICINE
Payer: MEDICARE

## 2021-08-21 ENCOUNTER — APPOINTMENT (OUTPATIENT)
Dept: CT IMAGING | Facility: HOSPITAL | Age: 71
DRG: 907 | End: 2021-08-21
Attending: FAMILY MEDICINE
Payer: MEDICARE

## 2021-08-21 PROBLEM — R00.0 SINUS TACHYCARDIA: Status: ACTIVE | Noted: 2021-08-21

## 2021-08-21 PROBLEM — D72.829 LEUKOCYTOSIS, UNSPECIFIED TYPE: Status: ACTIVE | Noted: 2021-08-21

## 2021-08-21 PROBLEM — N18.6 END-STAGE RENAL DISEASE ON HEMODIALYSIS (H): Status: ACTIVE | Noted: 2021-08-21

## 2021-08-21 PROBLEM — R50.9 FEVER, UNSPECIFIED FEVER CAUSE: Status: ACTIVE | Noted: 2021-08-21

## 2021-08-21 PROBLEM — Z99.2 END-STAGE RENAL DISEASE ON HEMODIALYSIS (H): Status: ACTIVE | Noted: 2021-08-21

## 2021-08-21 PROBLEM — A41.9 SEPSIS WITHOUT ACUTE ORGAN DYSFUNCTION, DUE TO UNSPECIFIED ORGANISM (H): Status: ACTIVE | Noted: 2021-08-21

## 2021-08-21 LAB
ALBUMIN SERPL-MCNC: 3.3 G/DL (ref 3.5–5)
ALBUMIN UR-MCNC: 300 MG/DL
ALP SERPL-CCNC: 69 U/L (ref 45–120)
ALT SERPL W P-5'-P-CCNC: <9 U/L (ref 0–45)
AMMONIA PLAS-SCNC: 20 UMOL/L (ref 11–35)
AMPHETAMINES UR QL SCN: ABNORMAL
ANION GAP SERPL CALCULATED.3IONS-SCNC: 12 MMOL/L (ref 5–18)
APPEARANCE UR: CLEAR
APTT PPP: 29 SECONDS (ref 22–38)
APTT PPP: 37 SECONDS (ref 22–38)
AST SERPL W P-5'-P-CCNC: 16 U/L (ref 0–40)
BARBITURATES UR QL: ABNORMAL
BENZODIAZ UR QL: ABNORMAL
BILIRUB SERPL-MCNC: 0.6 MG/DL (ref 0–1)
BILIRUB UR QL STRIP: NEGATIVE
BNP SERPL-MCNC: 139 PG/ML (ref 0–65)
BUN SERPL-MCNC: 38 MG/DL (ref 8–22)
CALCIUM SERPL-MCNC: 8.5 MG/DL (ref 8.5–10.5)
CANNABINOIDS UR QL SCN: ABNORMAL
CHLORIDE BLD-SCNC: 106 MMOL/L (ref 98–107)
CO2 SERPL-SCNC: 21 MMOL/L (ref 22–31)
COCAINE UR QL: ABNORMAL
COLOR UR AUTO: ABNORMAL
CREAT SERPL-MCNC: 5.39 MG/DL (ref 0.7–1.3)
CREAT UR-MCNC: 125 MG/DL
ERYTHROCYTE [DISTWIDTH] IN BLOOD BY AUTOMATED COUNT: 15.1 % (ref 10–15)
FIBRINOGEN PPP-MCNC: 712 MG/DL (ref 170–490)
GFR SERPL CREATININE-BSD FRML MDRD: 10 ML/MIN/1.73M2
GLUCOSE BLD-MCNC: 110 MG/DL (ref 70–125)
GLUCOSE BLDC GLUCOMTR-MCNC: 117 MG/DL (ref 70–125)
GLUCOSE BLDC GLUCOMTR-MCNC: 164 MG/DL (ref 70–125)
GLUCOSE BLDC GLUCOMTR-MCNC: 91 MG/DL (ref 70–125)
GLUCOSE UR STRIP-MCNC: 30 MG/DL
GRAM STAIN RESULT: NORMAL
GRAM STAIN RESULT: NORMAL
HBA1C MFR BLD: 5.7 %
HCT VFR BLD AUTO: 34.9 % (ref 40–53)
HGB BLD-MCNC: 10.9 G/DL (ref 13.3–17.7)
HGB UR QL STRIP: ABNORMAL
HOLD SPECIMEN: NORMAL
INR PPP: 1.11 (ref 0.9–1.15)
INR PPP: 1.24 (ref 0.9–1.15)
KETONES BLD-SCNC: <0.1 MMOL/L
KETONES UR STRIP-MCNC: NEGATIVE MG/DL
LDH SERPL L TO P-CCNC: 262 U/L (ref 125–220)
LEUKOCYTE ESTERASE UR QL STRIP: ABNORMAL
MCH RBC QN AUTO: 30.3 PG (ref 26.5–33)
MCHC RBC AUTO-ENTMCNC: 31.2 G/DL (ref 31.5–36.5)
MCV RBC AUTO: 97 FL (ref 78–100)
NITRATE UR QL: NEGATIVE
OPIATES UR QL SCN: ABNORMAL
OXYCODONE UR QL: ABNORMAL
PCP UR QL SCN: ABNORMAL
PH UR STRIP: 7 [PH] (ref 5–7)
PLATELET # BLD AUTO: 207 10E3/UL (ref 150–450)
POTASSIUM BLD-SCNC: 4.8 MMOL/L (ref 3.5–5)
PROT SERPL-MCNC: 6.8 G/DL (ref 6–8)
RBC # BLD AUTO: 3.6 10E6/UL (ref 4.4–5.9)
RBC URINE: <1 /HPF
SODIUM SERPL-SCNC: 139 MMOL/L (ref 136–145)
SP GR UR STRIP: 1.03 (ref 1–1.03)
SQUAMOUS EPITHELIAL: 1 /HPF
TROPONIN I SERPL-MCNC: 0.04 NG/ML (ref 0–0.29)
TSH SERPL DL<=0.005 MIU/L-ACNC: 0.74 UIU/ML (ref 0.3–5)
UROBILINOGEN UR STRIP-MCNC: <2 MG/DL
WBC # BLD AUTO: 19.5 10E3/UL (ref 4–11)
WBC URINE: 7 /HPF

## 2021-08-21 PROCEDURE — 81001 URINALYSIS AUTO W/SCOPE: CPT | Performed by: EMERGENCY MEDICINE

## 2021-08-21 PROCEDURE — 85384 FIBRINOGEN ACTIVITY: CPT | Performed by: FAMILY MEDICINE

## 2021-08-21 PROCEDURE — 70450 CT HEAD/BRAIN W/O DYE: CPT

## 2021-08-21 PROCEDURE — 87116 MYCOBACTERIA CULTURE: CPT | Performed by: INTERNAL MEDICINE

## 2021-08-21 PROCEDURE — 85610 PROTHROMBIN TIME: CPT | Performed by: FAMILY MEDICINE

## 2021-08-21 PROCEDURE — 83615 LACTATE (LD) (LDH) ENZYME: CPT | Performed by: FAMILY MEDICINE

## 2021-08-21 PROCEDURE — 84484 ASSAY OF TROPONIN QUANT: CPT | Performed by: FAMILY MEDICINE

## 2021-08-21 PROCEDURE — 82607 VITAMIN B-12: CPT | Performed by: FAMILY MEDICINE

## 2021-08-21 PROCEDURE — 36415 COLL VENOUS BLD VENIPUNCTURE: CPT | Performed by: FAMILY MEDICINE

## 2021-08-21 PROCEDURE — 80307 DRUG TEST PRSMV CHEM ANLYZR: CPT | Performed by: FAMILY MEDICINE

## 2021-08-21 PROCEDURE — 96367 TX/PROPH/DG ADDL SEQ IV INF: CPT

## 2021-08-21 PROCEDURE — 96366 THER/PROPH/DIAG IV INF ADDON: CPT

## 2021-08-21 PROCEDURE — 89050 BODY FLUID CELL COUNT: CPT | Performed by: FAMILY MEDICINE

## 2021-08-21 PROCEDURE — 250N000011 HC RX IP 250 OP 636: Performed by: EMERGENCY MEDICINE

## 2021-08-21 PROCEDURE — 96376 TX/PRO/DX INJ SAME DRUG ADON: CPT

## 2021-08-21 PROCEDURE — 87206 SMEAR FLUORESCENT/ACID STAI: CPT | Performed by: INTERNAL MEDICINE

## 2021-08-21 PROCEDURE — 258N000003 HC RX IP 258 OP 636: Performed by: EMERGENCY MEDICINE

## 2021-08-21 PROCEDURE — 250N000011 HC RX IP 250 OP 636: Performed by: FAMILY MEDICINE

## 2021-08-21 PROCEDURE — 71275 CT ANGIOGRAPHY CHEST: CPT

## 2021-08-21 PROCEDURE — 83036 HEMOGLOBIN GLYCOSYLATED A1C: CPT | Performed by: FAMILY MEDICINE

## 2021-08-21 PROCEDURE — 99223 1ST HOSP IP/OBS HIGH 75: CPT | Mod: AI | Performed by: FAMILY MEDICINE

## 2021-08-21 PROCEDURE — 99233 SBSQ HOSP IP/OBS HIGH 50: CPT | Performed by: INTERNAL MEDICINE

## 2021-08-21 PROCEDURE — 89051 BODY FLUID CELL COUNT: CPT | Performed by: FAMILY MEDICINE

## 2021-08-21 PROCEDURE — 250N000013 HC RX MED GY IP 250 OP 250 PS 637: Performed by: FAMILY MEDICINE

## 2021-08-21 PROCEDURE — 85730 THROMBOPLASTIN TIME PARTIAL: CPT | Performed by: FAMILY MEDICINE

## 2021-08-21 PROCEDURE — 272N000004 HC RX 272: Performed by: INTERNAL MEDICINE

## 2021-08-21 PROCEDURE — 3E1M39Z IRRIGATION OF PERITONEAL CAVITY USING DIALYSATE, PERCUTANEOUS APPROACH: ICD-10-PCS | Performed by: INTERNAL MEDICINE

## 2021-08-21 PROCEDURE — 82140 ASSAY OF AMMONIA: CPT | Performed by: FAMILY MEDICINE

## 2021-08-21 PROCEDURE — 74177 CT ABD & PELVIS W/CONTRAST: CPT

## 2021-08-21 PROCEDURE — 250N000009 HC RX 250: Performed by: FAMILY MEDICINE

## 2021-08-21 PROCEDURE — 94640 AIRWAY INHALATION TREATMENT: CPT

## 2021-08-21 PROCEDURE — 250N000009 HC RX 250: Performed by: EMERGENCY MEDICINE

## 2021-08-21 PROCEDURE — 94640 AIRWAY INHALATION TREATMENT: CPT | Mod: 76

## 2021-08-21 PROCEDURE — 87086 URINE CULTURE/COLONY COUNT: CPT | Performed by: FAMILY MEDICINE

## 2021-08-21 PROCEDURE — 85027 COMPLETE CBC AUTOMATED: CPT | Performed by: FAMILY MEDICINE

## 2021-08-21 PROCEDURE — 210N000001 HC R&B IMCU HEART CARE

## 2021-08-21 PROCEDURE — 99222 1ST HOSP IP/OBS MODERATE 55: CPT | Performed by: INTERNAL MEDICINE

## 2021-08-21 RX ORDER — IPRATROPIUM BROMIDE AND ALBUTEROL SULFATE 2.5; .5 MG/3ML; MG/3ML
3 SOLUTION RESPIRATORY (INHALATION) ONCE
Status: COMPLETED | OUTPATIENT
Start: 2021-08-21 | End: 2021-08-21

## 2021-08-21 RX ORDER — DEXTROSE MONOHYDRATE 25 G/50ML
25-50 INJECTION, SOLUTION INTRAVENOUS
Status: DISCONTINUED | OUTPATIENT
Start: 2021-08-21 | End: 2021-08-28 | Stop reason: HOSPADM

## 2021-08-21 RX ORDER — NALOXONE HYDROCHLORIDE 0.4 MG/ML
0.2 INJECTION, SOLUTION INTRAMUSCULAR; INTRAVENOUS; SUBCUTANEOUS
Status: DISCONTINUED | OUTPATIENT
Start: 2021-08-21 | End: 2021-08-28 | Stop reason: HOSPADM

## 2021-08-21 RX ORDER — ALBUTEROL SULFATE 5 MG/ML
2.5 SOLUTION RESPIRATORY (INHALATION) EVERY 6 HOURS PRN
Status: DISCONTINUED | OUTPATIENT
Start: 2021-08-21 | End: 2021-08-21

## 2021-08-21 RX ORDER — PIPERACILLIN SODIUM, TAZOBACTAM SODIUM 3; .375 G/15ML; G/15ML
3.38 INJECTION, POWDER, LYOPHILIZED, FOR SOLUTION INTRAVENOUS EVERY 8 HOURS
Status: DISCONTINUED | OUTPATIENT
Start: 2021-08-21 | End: 2021-08-21

## 2021-08-21 RX ORDER — ONDANSETRON 4 MG/1
4 TABLET, ORALLY DISINTEGRATING ORAL EVERY 6 HOURS PRN
Status: DISCONTINUED | OUTPATIENT
Start: 2021-08-21 | End: 2021-08-28 | Stop reason: HOSPADM

## 2021-08-21 RX ORDER — HYDROMORPHONE HYDROCHLORIDE 1 MG/ML
0.5 INJECTION, SOLUTION INTRAMUSCULAR; INTRAVENOUS; SUBCUTANEOUS EVERY 4 HOURS PRN
Status: DISCONTINUED | OUTPATIENT
Start: 2021-08-21 | End: 2021-08-24

## 2021-08-21 RX ORDER — PIPERACILLIN SODIUM, TAZOBACTAM SODIUM 3; .375 G/15ML; G/15ML
3.38 INJECTION, POWDER, LYOPHILIZED, FOR SOLUTION INTRAVENOUS EVERY 12 HOURS
Status: DISCONTINUED | OUTPATIENT
Start: 2021-08-21 | End: 2021-08-27

## 2021-08-21 RX ORDER — LIDOCAINE 40 MG/G
CREAM TOPICAL
Status: DISCONTINUED | OUTPATIENT
Start: 2021-08-21 | End: 2021-08-28 | Stop reason: HOSPADM

## 2021-08-21 RX ORDER — ALBUTEROL SULFATE 90 UG/1
2 AEROSOL, METERED RESPIRATORY (INHALATION) EVERY 4 HOURS PRN
Status: DISCONTINUED | OUTPATIENT
Start: 2021-08-21 | End: 2021-08-28 | Stop reason: HOSPADM

## 2021-08-21 RX ORDER — IOPAMIDOL 755 MG/ML
100 INJECTION, SOLUTION INTRAVASCULAR ONCE
Status: COMPLETED | OUTPATIENT
Start: 2021-08-21 | End: 2021-08-21

## 2021-08-21 RX ORDER — NALOXONE HYDROCHLORIDE 0.4 MG/ML
0.4 INJECTION, SOLUTION INTRAMUSCULAR; INTRAVENOUS; SUBCUTANEOUS
Status: DISCONTINUED | OUTPATIENT
Start: 2021-08-21 | End: 2021-08-28 | Stop reason: HOSPADM

## 2021-08-21 RX ORDER — PREDNISONE 20 MG/1
20 TABLET ORAL DAILY
Status: ON HOLD | COMMUNITY
End: 2021-08-28

## 2021-08-21 RX ORDER — ALBUTEROL SULFATE 0.83 MG/ML
2.5 SOLUTION RESPIRATORY (INHALATION)
Status: DISCONTINUED | OUTPATIENT
Start: 2021-08-21 | End: 2021-08-28 | Stop reason: HOSPADM

## 2021-08-21 RX ORDER — ACETAMINOPHEN 325 MG/1
650 TABLET ORAL EVERY 8 HOURS PRN
Status: DISCONTINUED | OUTPATIENT
Start: 2021-08-21 | End: 2021-08-22

## 2021-08-21 RX ORDER — NICOTINE POLACRILEX 4 MG
15-30 LOZENGE BUCCAL
Status: DISCONTINUED | OUTPATIENT
Start: 2021-08-21 | End: 2021-08-28 | Stop reason: HOSPADM

## 2021-08-21 RX ORDER — GENTAMICIN SULFATE 1 MG/G
CREAM TOPICAL DAILY PRN
Status: DISCONTINUED | OUTPATIENT
Start: 2021-08-21 | End: 2021-08-28 | Stop reason: HOSPADM

## 2021-08-21 RX ORDER — ONDANSETRON 2 MG/ML
4 INJECTION INTRAMUSCULAR; INTRAVENOUS EVERY 6 HOURS PRN
Status: DISCONTINUED | OUTPATIENT
Start: 2021-08-21 | End: 2021-08-28 | Stop reason: HOSPADM

## 2021-08-21 RX ADMIN — IPRATROPIUM BROMIDE AND ALBUTEROL SULFATE 3 ML: .5; 3 SOLUTION RESPIRATORY (INHALATION) at 01:48

## 2021-08-21 RX ADMIN — IOPAMIDOL 100 ML: 755 INJECTION, SOLUTION INTRAVENOUS at 02:40

## 2021-08-21 RX ADMIN — HYDROMORPHONE HYDROCHLORIDE 0.5 MG: 1 INJECTION, SOLUTION INTRAMUSCULAR; INTRAVENOUS; SUBCUTANEOUS at 20:34

## 2021-08-21 RX ADMIN — VANCOMYCIN HYDROCHLORIDE 1500 MG: 5 INJECTION, POWDER, LYOPHILIZED, FOR SOLUTION INTRAVENOUS at 01:06

## 2021-08-21 RX ADMIN — ACETAMINOPHEN 650 MG: 325 TABLET ORAL at 19:58

## 2021-08-21 RX ADMIN — ALBUTEROL SULFATE 2.5 MG: 2.5 SOLUTION RESPIRATORY (INHALATION) at 12:23

## 2021-08-21 RX ADMIN — ENOXAPARIN SODIUM 40 MG: 40 INJECTION SUBCUTANEOUS at 11:26

## 2021-08-21 RX ADMIN — PIPERACILLIN SODIUM AND TAZOBACTAM SODIUM 3.38 G: 3; .375 INJECTION, POWDER, LYOPHILIZED, FOR SOLUTION INTRAVENOUS at 10:18

## 2021-08-21 RX ADMIN — ALBUTEROL SULFATE 2.5 MG: 2.5 SOLUTION RESPIRATORY (INHALATION) at 08:18

## 2021-08-21 RX ADMIN — SODIUM CHLORIDE, SODIUM LACTATE, CALCIUM CHLORIDE, MAGNESIUM CHLORIDE AND DEXTROSE: 1.5; 538; 448; 18.3; 5.08 INJECTION, SOLUTION INTRAPERITONEAL at 15:49

## 2021-08-21 RX ADMIN — ALBUTEROL SULFATE 2.5 MG: 2.5 SOLUTION RESPIRATORY (INHALATION) at 01:48

## 2021-08-21 RX ADMIN — ALBUTEROL SULFATE 2.5 MG: 2.5 SOLUTION RESPIRATORY (INHALATION) at 16:28

## 2021-08-21 RX ADMIN — ALBUTEROL SULFATE 2.5 MG: 2.5 SOLUTION RESPIRATORY (INHALATION) at 20:35

## 2021-08-21 RX ADMIN — ACETAMINOPHEN 650 MG: 325 TABLET ORAL at 11:26

## 2021-08-21 RX ADMIN — PIPERACILLIN SODIUM AND TAZOBACTAM SODIUM 3.38 G: 3; .375 INJECTION, POWDER, LYOPHILIZED, FOR SOLUTION INTRAVENOUS at 22:53

## 2021-08-21 ASSESSMENT — ENCOUNTER SYMPTOMS
COUGH: 0
DYSURIA: 0
SEIZURES: 0
ARTHRALGIAS: 0
FEVER: 0
LIGHT-HEADEDNESS: 1
EYE PAIN: 0
SHORTNESS OF BREATH: 0
VOMITING: 0
CHILLS: 0
BACK PAIN: 0
COLOR CHANGE: 0
HEMATURIA: 0
SORE THROAT: 0
PALPITATIONS: 0
ABDOMINAL PAIN: 0

## 2021-08-21 ASSESSMENT — ACTIVITIES OF DAILY LIVING (ADL)
WALKING_OR_CLIMBING_STAIRS_DIFFICULTY: YES
TOILETING_ASSISTANCE: TOILETING DIFFICULTY, ASSISTANCE 1 PERSON
PATIENT_/_FAMILY_COMMUNICATION_STYLE: SPOKEN LANGUAGE (ENGLISH OR BILINGUAL)
DRESSING/BATHING: BATHING DIFFICULTY, ASSISTANCE 1 PERSON
DRESSING/BATHING_DIFFICULTY: YES
CONCENTRATING,_REMEMBERING_OR_MAKING_DECISIONS_DIFFICULTY: YES
WHICH_OF_THE_ABOVE_FUNCTIONAL_RISKS_HAD_A_RECENT_ONSET_OR_CHANGE?: AMBULATION;TRANSFERRING;COGNITION
FALL_HISTORY_WITHIN_LAST_SIX_MONTHS: YES
TOILETING_ISSUES: YES
WEAR_GLASSES_OR_BLIND: NO
DIFFICULTY_EATING/SWALLOWING: NO
WALKING_OR_CLIMBING_STAIRS: STAIR CLIMBING DIFFICULTY, ASSISTANCE 1 PERSON
HEARING_DIFFICULTY_OR_DEAF: NO
NUMBER_OF_TIMES_PATIENT_HAS_FALLEN_WITHIN_LAST_SIX_MONTHS: 1
DIFFICULTY_COMMUNICATING: NO

## 2021-08-21 ASSESSMENT — MIFFLIN-ST. JEOR: SCORE: 1770.98

## 2021-08-21 NOTE — PHARMACY-VANCOMYCIN DOSING SERVICE
Pharmacy Vancomycin Initial Note  Date of Service 2021  Patient's  1950  70 year old, male    Indication: Sepsis    Current estimated CrCl = CrCl cannot be calculated (Unknown ideal weight.).    Creatinine for last 3 days  2021: 10:45 PM Creatinine 5.32 mg/dL    Recent Vancomycin Level(s) for last 3 days  No results found for requested labs within last 72 hours.      Vancomycin IV Administrations (past 72 hours)      No vancomycin orders with administrations in past 72 hours.                Nephrotoxins and other renal medications (From now, onward)    Start     Dose/Rate Route Frequency Ordered Stop    21 0000  vancomycin 1500 mg in 0.9% NaCl 250 ml intermittent infusion 1,500 mg      1,500 mg  over 90 Minutes Intravenous ONCE 21 2356            Contrast Orders - past 72 hours (72h ago, onward)    None              Plan:  1. Vancomycin  1500 mg IV x 1 dose in the Emergency department (15 mg/kg).   2. Vancomycin monitoring method: AUC  3. Vancomycin therapeutic monitoring goal: 400-600 mg*h/L   4. Please re consult pharmacy if vancomycin is to continue on hospital admission.    Britta Andersen [facility]

## 2021-08-21 NOTE — H&P
"ADMISSION HISTORY & PHYSICAL        ADMIT DATE: 8/20/2021      FACILITY: [facility]      PCP: Mc Gomez, [phone]     ASSESSMENT AND PLAN:  Patient is a 70 year old male with  has a past medical history of Anxiety and depression, Asthma, severe persistent, poorly-controlled (1958), ESRD (end stage renal disease) on dialysis (H), HLD (hyperlipidemia), Hypertension, IVY (obstructive sleep apnea), and Type 2 diabetes mellitus (H) (09/01/1980). comes in for AMS and fever.      Active Problems:    Sinus tachycardia    End-stage renal disease on hemodialysis (H)    Fever, unspecified fever cause    Leukocytosis, unspecified type    Sepsis without acute organ dysfunction, due to unspecified organism (H)    OPAL?  -CT chest shows: \"Compared to 11/21/2019, progression in chronic bilateral reticulonodular interstitial infiltrates likely on the basis of atypical small airway infection such as OPAL.\"  -ID consult     Chest pain  -complains of chest pain at dialysis site  -EKG shows sinus tach with HR of 113 bpm. EKG had much artifact. Troponin x 1 negative .  -trend troponins  -tylenol PRN      Peritonitis/septic  -patient was septic on admission with fever of 104.7 F and leukocytosis of 17.6  -Patient has had an infection to peritoneal dialysis port recently and has been on antibiotics.  -blood cultures pending and gram stain of fluid pending   -IV zosyn and IV vanco for now  -nephro consult      ESRD  -MWF dialysis  -nephro consult  -c/w home NaHCO3 and renvela      Confusion  -patient is alert to person and place but not to time and cannot give writer a hx  -most likely from underlying infection   -EKG shows sinus tach with HR of 113 bpm. EKG had much artifact. Troponin x 1 negative .   -ABG was unremarkable   -CT brain  -vitamin B12  -ammonia level  -TSH  -tox screen   -trend troponins   -c/w peritonitis treatment as above       Fall  -had fall in ED with his confusion. Did not hit " "head.  -1:1 sitter  -fall precautions       Chronic conditions after med rec completed    GERD  -c/w home protonix    Hx of depression  -c/w home paxil  -c/w home zyprexa, haldol  -hold home gabapentin, ativan,  for now    Hx of asthma  -c/w home duoneb, symbicort, breo  -c/w home claritin and flonase    Hx of chronic pain  -hold home diluadid for now    HTN/CAD  -last echo showed LVEF of 55-60%  -c/w home lasix, norvasc, coreg, hygroton, lisinopril     HLD  -c/w home lipitor      Hx of DM  -from current med list, not on any DM meds at home.   -obtain HgbA1c  -blood sugar on admission was 117  -frail sliding scale insulin for now  -c/w accuchecks and adjust insulin regimen PRN    Anemia  -Hgb at baseline  -monitor daily      Lightheadedness  -orthostatics  -TSH  -PT/OT      FEN/GI: renal, diabetic, low fat diet  DVT proph: lovenox if no procedure today  Code status: Full code for now, re-assess when confusion has resolved    Updated SO Jeanine on current plan.     Disposition:  -Anticipated Length of Stay in midnights and medical necessity (including a midnight in the Emergency Department after triage if applicable): 2-3 nights  -Discharge barriers: nephro consult, cultures pending, IV antibxs, confusion       CHIEF COMPLAINT:  Chief Complaint   Patient presents with     Altered Mental Status     Fever        HISTORY OF PRESENTING ILLNESS:  Patient is a 70 year old male with  has a past medical history of Anxiety and depression, Asthma, severe persistent, poorly-controlled (1958), ESRD (end stage renal disease) on dialysis (H), HLD (hyperlipidemia), Hypertension, IVY (obstructive sleep apnea), and Type 2 diabetes mellitus (H) (09/01/1980). comes in for AMS and fever.      History limited per patient's AMS. Obtained history from ED provider's note:    \"Dylon Trevizo is a 70 year old male with pertinent medical history of DM II, HTN, CKD stage V (MWF dialysis), who presents by EMS for evaluation of altered mental " status and fever.      Per chart review, patient seen at  [facility] Emergency Department on 8/18 (~2 days ago) for evaluation of shortness of breath. Patient had only half and run on (8/13)and then missed the next dialysis (8/16) prior to the visit and he normally is MWF. He had 2 L taken off on 8/13 (~7 days ago). Chest X-ray showed no evidence of pneumonia. BMP showed hyperkalemia with potassium of 6.3. IV calcium, insulin, and glucose were administered. CBC showed elevated white blood count. Patient had been on prednisone 40 mg since 8/16 (~4 days ago). Patient was discharged home with plan to complete a dialysis run the next day.       History Limited t Per Patient Due to Patient's Altered Mental Status     Per patient, he states he feels weak, fatigued, and warm. He endorses coughing and vague chest pain. Patient is alert to person, place, and time, but gets confused to historic timeline.      Per EMS, patient's significant other came home this evening around 2030 (~2 hours ago) and found her  to be confused. Patient has had an infection to peritoneal dialysis port recently and has been on antibiotics.     Per significant other, he has been having difficulty with his tunnel vascular catheter working, so he has been unable to complete his dialysis, which landed patient in the ED at [facility] on 8/18 (~2 days ago). They treated patient for lung infection and got steroids. In addition he has had a high potassium. He was able to complete dialysis 8/17, 8/18, and 8/19, but they had trouble getting enough volume off. Today patient was outside mowing the lawn today, but is unsure of how long he stayed outside. This afternoon when he came back inside patient reported feeling warm and was confused. She states he suddenly started to decompensate and it got worse through the night. He has peritoneal dialysis catheter that was recently placed resulting, which patient has tried to use, but it was giving him pain, so  "they told him to give him time. Patient has been coughing, but no hemoptysis. She states he received his COVID vaccines.\"      Patient complains of chest pain at dialysis site and lightheadedness.     PMH:  Past Medical History:   Diagnosis Date     Anxiety and depression      Asthma, severe persistent, poorly-controlled 1958     ESRD (end stage renal disease) on dialysis (H)      HLD (hyperlipidemia)      Hypertension      IVY (obstructive sleep apnea)      Type 2 diabetes mellitus (H) 09/01/1980       PSH:  Past Surgical History:   Procedure Laterality Date     APPENDECTOMY  1970's     APPENDECTOMY       COLONOSCOPY  2018    [facility]     HEAD & NECK SURGERY  as child    \"tumor removed x 2\"     [place] LAP INSERTION TUNNELED INTRAPERITONEAL CATHETER N/A 10/11/2019    Procedure: INSERTION, CATHETER, PERITONEAL, LAPAROSCOPIC, LYSIS OF ADHESIONS;  Surgeon: Barrie Britt MD;  Location: [facility];  Service: General     THORACIC SURGERY          ALLERGIES:  Allergies   Allergen Reactions     Ace Inhibitors Unknown and Other (See Comments)     Hyperkalemia  Hyperkalemia  hyperkalemia       Losartan Unknown and Other (See Comments)     hyperkalemia  Hyperkalemia  hyperkalemia       Tramadol Other (See Comments) and Shortness Of Breath     SOB  Dyspnea       Aminophylline Nausea and Vomiting and Unknown     Nausea/vomiting       Folic Acid-Vit B6-Vit B12 Unknown     Unknown  Other reaction(s): Unknown  Unknown       Sulfamethoxazole-Trimethoprim Nausea and Vomiting and Nausea     Nausea/vomiting       Theophylline Nausea and Vomiting     Nausea/vomiting       Vitamin B12 Unknown     unknown  Other reaction(s): *Unknown       Ceftriaxone Rash     Came in with strep, rash after eeceiving rocephin, localized to his back only-c/o of itching  Came in with strep, rash after eeceiving rocephin, localized to his back only-c/o of itching  Rash/itching  Came in with strep, rash after eeceiving rocephin, localized to his back " only-c/o of itching  Rash/itching  Came in with strep, rash after eeceiving rocephin, localized to his back only-c/o of itching  Tolerated cefazolin 10/2018  Came in with strep, rash after eeceiving rocephin, localized to his back only-c/o of itching       Clavulanic Acid Nausea and Rash     aka  AUGMENTIN  (NAUSEA)  aka  AUGMENTIN  (NAUSEA)  Unknown  aka  AUGMENTIN  (NAUSEA)  Unknown  aka  AUGMENTIN  (NAUSEA)  Other reaction(s): Nausea Only  aka  AUGMENTIN  (NAUSEA)  aka  AUGMENTIN  (NAUSEA)       Hydralazine Muscle Pain (Myalgia) and Rash     Muscle aches, severe cramps, constant, felt like RA in hands  Muscle aches; severe cramps, felt like RA in hands, constant   myalgia         MEDICATIONS:  Reviewed.  Current Facility-Administered Medications   Medication     albuterol (PROVENTIL) neb solution 2.5 mg     albuterol (PROVENTIL) neb solution 2.5 mg     sodium chloride (PF) 0.9% PF flush 3 mL     sodium chloride (PF) 0.9% PF flush 3 mL     Current Outpatient Medications   Medication     acetaminophen (TYLENOL) 325 MG tablet     albuterol (VENTOLIN HFA) 108 (90 Base) MCG/ACT inhaler     amLODIPine (NORVASC) 10 MG tablet     aspirin (ASA) 81 MG tablet     atorvastatin (LIPITOR) 20 MG tablet     betamethasone valerate (VALISONE) 0.1 % external lotion     blood glucose (FREESTYLE TEST STRIPS) test strip     blood glucose (ONETOUCH ULTRA) test strip     blood glucose (ONETOUCH ULTRA) test strip     budesonide-formoterol (SYMBICORT) 160-4.5 MCG/ACT Inhaler     carvedilol (COREG) 12.5 MG tablet     cetirizine (ZYRTEC ALLERGY) 10 MG tablet     chlorthalidone (HYGROTON) 25 MG tablet     clindamycin (CLEOCIN T) 1 % external solution     cyclobenzaprine (FLEXERIL) 10 MG tablet     diclofenac (VOLTAREN) 1 % topical gel     docusate sodium (COLACE) 100 MG capsule     doxycycline monohydrate (MONODOX) 100 MG capsule     famotidine (PEPCID) 20 MG tablet     fluticasone (FLONASE) 50 MCG/ACT nasal spray     fluticasone-vilanterol  (BREO ELLIPTA) 200-25 MCG/INH inhaler     furosemide (LASIX) 40 MG tablet     gabapentin (NEURONTIN) 100 MG capsule     glucagon 1 MG SOLR injection     haloperidol (HALDOL) 5 MG tablet     HYDROmorphone (DILAUDID) 2 MG tablet     ipratropium - albuterol 0.5 mg/2.5 mg/3 mL (DUONEB) 0.5-2.5 (3) MG/3ML neb solution     lisinopril (PRINIVIL/ZESTRIL) 40 MG tablet     loratadine (CLARITIN) 10 MG tablet     LORazepam (ATIVAN) 1 MG tablet     melatonin 3 MG tablet     OLANZapine (ZYPREXA) 5 MG tablet     ondansetron (ZOFRAN-ODT) 4 MG ODT tab     ONETOUCH ULTRA test strip     pantoprazole (PROTONIX) 40 MG EC tablet     PARoxetine (PAXIL) 20 MG tablet     polyethylene glycol (MIRALAX/GLYCOLAX) packet     Respiratory Therapy Supplies (AIRS DISPOSABLE NEBULIZER) KIT     senna (SENOKOT) 8.6 MG tablet     sevelamer carbonate, RENVELA, 0.8 GM PACK Packet     Sharps Container (SHARPS-A-GATOR LOCKING BRACKET) MISC     sodium bicarbonate 325 MG tablet     sodium polystyrene (KAYEXALATE) powder     vitamin A & D (BABY) external ointment     vitamin D2 (ERGOCALCIFEROL) 21920 units (1250 mcg) capsule        SOCIAL HISTORY:  Social History     Socioeconomic History     Marital status:      Spouse name: Not on file     Number of children: Not on file     Years of education: Not on file     Highest education level: Not on file   Occupational History     Not on file   Tobacco Use     Smoking status: Former Smoker     Quit date: 1999     Years since quittin.6     Smokeless tobacco: Never Used   Substance and Sexual Activity     Alcohol use: Yes     Comment: Not currently     Drug use: Not Currently     Sexual activity: Not on file   Other Topics Concern     Parent/sibling w/ CABG, MI or angioplasty before 65F 55M? Not Asked   Social History Narrative     Not on file     Social Determinants of Health     Financial Resource Strain:      Difficulty of Paying Living Expenses:    Food Insecurity:      Worried About Running Out of  Food in the Last Year:      Ran Out of Food in the Last Year:    Transportation Needs:      Lack of Transportation (Medical):      Lack of Transportation (Non-Medical):    Physical Activity:      Days of Exercise per Week:      Minutes of Exercise per Session:    Stress:      Feeling of Stress :    Social Connections:      Frequency of Communication with Friends and Family:      Frequency of Social Gatherings with Friends and Family:      Attends Denominational Services:      Active Member of Clubs or Organizations:      Attends Club or Organization Meetings:      Marital Status:    Intimate Partner Violence:      Fear of Current or Ex-Partner:      Emotionally Abused:      Physically Abused:      Sexually Abused:        FAMILY HISTORY:  Family History   Problem Relation Age of Onset     Lung Cancer Father      Heart Disease Father      Lung Cancer Sister      Lung Cancer Brother      Heart Disease Brother      Heart Disease Mother        ROS:  Review of Systems   Constitutional: Negative for chills and fever.   HENT: Negative for ear pain and sore throat.    Eyes: Negative for pain and visual disturbance.   Respiratory: Negative for cough and shortness of breath.    Cardiovascular: Positive for chest pain. Negative for palpitations and leg swelling.   Gastrointestinal: Negative for abdominal pain and vomiting.   Genitourinary: Negative for dysuria and hematuria.   Musculoskeletal: Negative for arthralgias and back pain.   Skin: Negative for color change and rash.   Neurological: Positive for light-headedness. Negative for seizures and syncope.   All other systems reviewed and are negative.         PHYSICAL EXAM:  Patient Vitals for the past 24 hrs:   BP Temp Temp src Pulse Resp SpO2 Weight   08/21/21 0800 128/60 -- -- 97 22 94 % --   08/21/21 0600 (!) 165/73 -- -- 94 24 93 % --   08/21/21 0530 (!) 164/71 -- -- 100 24 93 % --   08/21/21 0500 (!) 165/76 -- -- 89 21 95 % --   08/21/21 0430 (!) 142/67 -- -- 98 22 94 % --    08/21/21 0400 (!) 149/66 -- -- 98 22 93 % --   08/21/21 0300 (!) 159/70 -- -- 88 24 96 % --   08/21/21 0245 (!) 140/66 -- -- 90 24 97 % --   08/21/21 0230 138/63 -- -- 91 18 97 % --   08/21/21 0215 -- -- -- 86 (!) 50 96 % --   08/21/21 0200 103/58 -- -- 89 24 100 % --   08/21/21 0145 121/69 -- -- 95 29 92 % --   08/21/21 0130 110/58 -- -- 96 21 94 % --   08/21/21 0115 102/58 -- -- 100 17 94 % --   08/21/21 0100 102/56 -- -- 96 23 94 % --   08/21/21 0045 104/69 -- -- 97 23 94 % --   08/21/21 0030 121/56 -- -- 99 22 96 % --   08/21/21 0015 111/58 -- -- 98 22 95 % --   08/21/21 0000 95/62 -- -- 106 17 95 % --   08/20/21 2359 -- 99  F (37.2  C) Oral -- -- -- --   08/20/21 2345 108/51 -- -- 109 19 94 % --   08/20/21 2330 108/53 -- -- 108 23 93 % --   08/20/21 2315 105/53 -- -- 112 19 95 % --   08/20/21 2300 109/52 -- -- 116 21 94 % --   08/20/21 2245 121/58 -- -- 114 21 94 % --   08/20/21 2230 130/58 -- -- 116 17 91 % --   08/20/21 2221 111/56 (!) 104.7  F (40.4  C) Rectal 113 -- 95 % --   08/20/21 2215 113/59 -- -- 114 22 91 % 100 kg (220 lb 7.4 oz)      No intake or output data in the 24 hours ending 08/21/21 0750  GENRL: Alert and oriented to person and place only. Not in acute distress. Satting at 95% on room air. Sleepy.   HEENT: NC/AT      Neck- supple      Sclera- anicteric      Mucous membrane- moist and pink  CHEST: Clear to auscultation bilaterally  HEART: S1S2 regular. No murmurs, rubs or gallops  ABDMN: Soft. Non-tender. No guarding or rigidity. Bowel sounds- active  EXTRM:  No pedal edema.   NEURO: No involuntary movements  INTGM: please see nursing assessment for full skin assessment  PULSES: 2+ and symmetric all extremities        DIAGNOSTIC DATA:  Recent Results (from the past 24 hour(s))   Symptomatic COVID-19 Virus (Coronavirus) by PCR Nasopharyngeal    Collection Time: 08/20/21 10:34 PM    Specimen: Nasopharyngeal; Swab   Result Value Ref Range    SARS CoV2 PCR Negative Negative   Ketone  Beta-Hydroxybutyrate Quantitative    Collection Time: 08/20/21 10:43 PM   Result Value Ref Range    Ketone (Beta-Hydroxybutyrate) Quantitative <0.10 <=0.3 mmol/L   Troponin I (now)    Collection Time: 08/20/21 10:43 PM   Result Value Ref Range    Troponin I 0.04 0.00 - 0.29 ng/mL   B-Type Natriuretic Peptide ([facility] Only)    Collection Time: 08/20/21 10:43 PM   Result Value Ref Range     (H) 0 - 65 pg/mL   Lactic acid whole blood    Collection Time: 08/20/21 10:44 PM   Result Value Ref Range    Lactic Acid 1.1 0.7 - 2.0 mmol/L   Blood gas venous    Collection Time: 08/20/21 10:44 PM   Result Value Ref Range    pH Venous 7.44 7.35 - 7.45    pCO2 Venous 40 35 - 50 mm Hg    pO2 Venous 36 25 - 47 mm Hg    Bicarbonate Venous 26 24 - 30 mmol/L    Base Excess/Deficit (+/-) 3.0   mmol/L    Oxyhemoglobin Venous 69.6 (L) 70.0 - 75.0 %    O2 Sat, Venous 70.9 70.0 - 75.0 %   Comprehensive metabolic panel    Collection Time: 08/20/21 10:45 PM   Result Value Ref Range    Sodium 138 136 - 145 mmol/L    Potassium 4.4 3.5 - 5.0 mmol/L    Chloride 104 98 - 107 mmol/L    Carbon Dioxide (CO2) 23 22 - 31 mmol/L    Anion Gap 11 5 - 18 mmol/L    Urea Nitrogen 39 (H) 8 - 22 mg/dL    Creatinine 5.32 (H) 0.70 - 1.30 mg/dL    Calcium 8.6 8.5 - 10.5 mg/dL    Glucose 117 70 - 125 mg/dL    Alkaline Phosphatase 70 45 - 120 U/L    AST 14 0 - 40 U/L    ALT <9 0 - 45 U/L    Protein Total 6.9 6.0 - 8.0 g/dL    Albumin 3.2 (L) 3.5 - 5.0 g/dL    Bilirubin Total 0.6 0.0 - 1.0 mg/dL    GFR Estimate 10 (L) >60 mL/min/1.73m2   Lipase    Collection Time: 08/20/21 10:45 PM   Result Value Ref Range    Lipase 41 0 - 52 U/L   INR    Collection Time: 08/20/21 10:45 PM   Result Value Ref Range    INR 1.11 0.90 - 1.15   Partial thromboplastin time    Collection Time: 08/20/21 10:45 PM   Result Value Ref Range    aPTT 29 22 - 38 Seconds   CBC with platelets and differential    Collection Time: 08/20/21 10:45 PM   Result Value Ref Range    WBC Count 17.6 (H)  4.0 - 11.0 10e3/uL    RBC Count 3.86 (L) 4.40 - 5.90 10e6/uL    Hemoglobin 11.5 (L) 13.3 - 17.7 g/dL    Hematocrit 36.8 (L) 40.0 - 53.0 %    MCV 95 78 - 100 fL    MCH 29.8 26.5 - 33.0 pg    MCHC 31.3 (L) 31.5 - 36.5 g/dL    RDW 15.1 (H) 10.0 - 15.0 %    Platelet Count 225 150 - 450 10e3/uL    % Neutrophils 79 %    % Lymphocytes 11 %    % Monocytes 9 %    % Eosinophils 0 %    % Basophils 0 %    % Immature Granulocytes 1 %    NRBCs per 100 WBC 0 <1 /100    Absolute Neutrophils 14.1 (H) 1.6 - 8.3 10e3/uL    Absolute Lymphocytes 2.0 0.8 - 5.3 10e3/uL    Absolute Monocytes 1.5 (H) 0.0 - 1.3 10e3/uL    Absolute Eosinophils 0.0 0.0 - 0.7 10e3/uL    Absolute Basophils 0.0 0.0 - 0.2 10e3/uL    Absolute Immature Granulocytes 0.1 (H) <=0.0 10e3/uL    Absolute NRBCs 0.0 10e3/uL   Extra Red Top Tube    Collection Time: 08/20/21 10:45 PM   Result Value Ref Range    Hold Specimen JIC       Results for orders placed or performed during the hospital encounter of 08/20/21   XR Chest Port 1 View    Impression    IMPRESSION: Tunneled right internal jugular dialysis catheter terminates in the SVC. Poststernotomy. Normal heart size and pulmonary vascularity. Mild atelectasis or scarring of the lung bases. Lungs otherwise clear. No pleural fluid or pneumothorax.   CT Chest Pulmonary Embolism w Contrast    Impression    IMPRESSION:  1.  No pulmonary embolus.  2.  Compared to 11/21/2019, progression in chronic bilateral reticulonodular interstitial infiltrates likely on the basis of atypical small airway infection such as OPAL.  3.  No acute process in the abdomen or pelvis.   Abd/pelvis CT,  IV  contrast only TRAUMA / AAA    Impression    IMPRESSION:  1.  No pulmonary embolus.  2.  Compared to 11/21/2019, progression in chronic bilateral reticulonodular interstitial infiltrates likely on the basis of atypical small airway infection such as OPAL.  3.  No acute process in the abdomen or pelvis.      All lab studies reviewed  personally    Radiology Results: imaging impressions reviewed       Total time is 70 minutes with greater than 50% of time spent in chart review, coordination of care with ED provider/patient's PCP/provider from outside facility and consultants, and discussing plan of care with patient and his/her family.     Zaid Pressley MD.   [facility]   [phone]   Pager [phone]

## 2021-08-21 NOTE — ED TRIAGE NOTES
Pt brought in by [facility].  Pt's wife came home this evening at 2030 and found pt to be very confused.  Pt has had infection to peritoneal dialysis port recently has been supposedly not been taking his antibiotics.  Pt appears confused on arrival to ED and feels significantly warm.

## 2021-08-21 NOTE — CONSULTS
"    RENAL CONSULT NOTE    REQUESTING PHYSICIAN: Wendie    REASON FOR CONSULT: ESRD, fever    Consults      ASSESSMENT/PLAN:  Pt with ESRD was on PD, exit and tunnel infx, PD cath removed, then replaced 7/29. Now on temporary IHD via CVC. Chemistries well controlled c/w last run yesterday. Volume looks controlled. No fluid overload.   Gram stain on PD fluid neg, added cell count to PD fluid analysis(though fluid collected last night was likely in pt for days)  nontender abd and no evidence on exam of PD cath infx.   Plan try to use PD cath with low volumes tonight and if tolerated., will increase to his usual PD rx as tolerated.     Fever, inc wbc, c/o pain (among other c/o) from CVC. Agree CVC is most likely source. Will ask IR to remove HD cath as soon as possible  PD cath no obvious infx or peritonitis    Lung infiltrates, he's had prominent recurrent c/o sob and he is not volume overloaded and had inc reticulonodular infiltrates. He's taken a lot of prednisone. ?consider OI, ?could he have PCP. Will d/w ID    ACD on epo 20K weekly at PD clinic    Hyper PTH on renal diet and binders. renvela    HTN amlodipine and coreg    Depression, anxiety, on sertraline     Marzena Shelby MD  [facility]  [phone]              HPI: pt with ESRD well known to me from [facility]  Has been on CCPD for ~1 year   Got severe exit and tunnel infx mid July, removed catheter 7/13/21 and placed CVC for HD, treated with 2 weeks abx  Has been on IHD.  PD cath replaced 7/29/21  Attempted to use PD cath ~August 10 and c/o pain thought due to new PD cath/ healing so continued HD and planned to try to transition after another ~10 days.   Pt seen by Surgery in [facility] shortly after PD cath replaced and thought had \"infx incision\" and took 1 week keflex.   Pt now c/o feeling awful  Yesterday we spoke, c/o \"sob and I 've got fluid on me\" He ran 8/19, 8/20 on HD and pulled 2+M kg each run.   Also \"they " "told me the (HD cath) has been in me too long and it's coming out\"  He has very fractured care, goes to [facility] and now [facility]  He reports taking several recent courses of steroids for \"asthma\" recently  He is very anxious and has been verbally abusive with RN and receptionists.   Weight down from 103kg -->99kg last couple weeks.       Last evening after HD was \"incoherent\" and sent to ER.   Temp 104.7 max  REVIEW OF SYSTEMS:  Little urine no dysuria  BMs normal just had one here.     Past Medical History:   Diagnosis Date     Anxiety and depression      Asthma, severe persistent, poorly-controlled 1958     ESRD (end stage renal disease) on dialysis (H)      HLD (hyperlipidemia)      Hypertension      IVY (obstructive sleep apnea)      Type 2 diabetes mellitus (H) 09/01/1980       [unfilled]      ALLERGIES/SENSITIVITIES:  Allergies   Allergen Reactions     Ace Inhibitors Unknown and Other (See Comments)     Hyperkalemia  Hyperkalemia  hyperkalemia       Losartan Unknown and Other (See Comments)     hyperkalemia  Hyperkalemia  hyperkalemia       Tramadol Other (See Comments) and Shortness Of Breath     SOB  Dyspnea       Aminophylline Nausea and Vomiting and Unknown     Nausea/vomiting       Folic Acid-Vit B6-Vit B12 Unknown     Unknown  Other reaction(s): Unknown  Unknown       Sulfamethoxazole-Trimethoprim Nausea and Vomiting and Nausea     Nausea/vomiting       Theophylline Nausea and Vomiting     Nausea/vomiting       Vitamin B12 Unknown     unknown  Other reaction(s): *Unknown       Ceftriaxone Rash     Came in with strep, rash after eeceiving rocephin, localized to his back only-c/o of itching  Came in with strep, rash after eeceiving rocephin, localized to his back only-c/o of itching  Rash/itching  Came in with strep, rash after eeceiving rocephin, localized to his back only-c/o of itching  Rash/itching  Came in with strep, rash after eeceiving rocephin, localized to his back " only-c/o of itching  Tolerated cefazolin 10/2018  Came in with strep, rash after eeceiving rocephin, localized to his back only-c/o of itching       Clavulanic Acid Nausea and Rash     aka  AUGMENTIN  (NAUSEA)  aka  AUGMENTIN  (NAUSEA)  Unknown  aka  AUGMENTIN  (NAUSEA)  Unknown  aka  AUGMENTIN  (NAUSEA)  Other reaction(s): Nausea Only  aka  AUGMENTIN  (NAUSEA)  aka  AUGMENTIN  (NAUSEA)       Hydralazine Muscle Pain (Myalgia) and Rash     Muscle aches, severe cramps, constant, felt like RA in hands  Muscle aches; severe cramps, felt like RA in hands, constant   myalgia            PHYSICAL EXAM:  Physical Exam   Temp: 97.7  F (36.5  C) Temp src: Oral BP: 105/58 Pulse: 84   Resp: 24 SpO2: 94 % O2 Device: Nasal cannula Oxygen Delivery: 2 LPM  Vitals:    08/20/21 2215 08/21/21 1322   Weight: 100 kg (220 lb 7.4 oz) 100 kg (220 lb 6.4 oz)     Vital Signs with Ranges  Temp:  [97.7  F (36.5  C)-104.7  F (40.4  C)] 97.7  F (36.5  C)  Pulse:  [] 84  Resp:  [17-50] 24  BP: ()/(51-76) 105/58  SpO2:  [91 %-100 %] 94 %  No intake/output data recorded.     Patient Vitals for the past 72 hrs:   Weight   08/21/21 1322 100 kg (220 lb 6.4 oz)   08/20/21 2215 100 kg (220 lb 7.4 oz)   [unfilled]    General - A & O x 3, NAD but looks fatigued/ lethargic  HEENT - PERRLA, no scleral icterus  Neck - no JVD  Respiratory - Lungs bilat exp wheezes on post exam occasional crackle  Cardiovascular - AP RRR with murmur loudest toward axilla  Abdomen - soft, BS present, LLQ PD cath with clean healed exit site. Tunnel no inflammation, mild tenderness at palpable cuff  RLQ site of old cath is healed no evidence of infx.   Extremities - well perfused, no edema  Integumentary - intact, good turgor, no rash/lesions  Neurologic - grossly intact  Psych:  Subdued.   R chest CVC in place, no cuff exposed, minimal redness at exit, no drainage. No tunnel inflammation.       Laboratory:     Recent Labs   Lab 08/21/21  1124 08/20/21  2245   WBC  19.5* 17.6*   RBC 3.60* 3.86*   HGB 10.9* 11.5*   HCT 34.9* 36.8*    225       Basic Metabolic Panel:  Recent Labs   Lab 08/21/21  1052 08/20/21 2245 08/20/21 2243   NA  --  138 139   POTASSIUM  --  4.4 4.8   CHLORIDE  --  104 106   CO2  --  23 21*   BUN  --  39* 38*   CR  --  5.32* 5.39*    117 110   NISREEN  --  8.6 8.5       INR  Recent Labs   Lab 08/20/21 2245   INR 1.11       Recent Labs   Lab Test 08/20/21 2245 08/20/21 2243   POTASSIUM 4.4 4.8   CHLORIDE 104 106   BUN 39* 38*      Recent Labs   Lab Test 08/21/21  1054 08/20/21 2245 08/20/21 2243 02/23/18  1021 02/22/18  1705   ALBUMIN  --  3.2* 3.3*   < >  --    BILITOTAL  --  0.6 0.6   < >  --    ALT  --  <9 <9   < >  --    AST  --  14 16   < >  --    PROTEIN 300 *  --   --   --  100 mg/dL*    < > = values in this interval not displayed.       Personally reviewed today's laboratory studies  Rad reviewed CTA chest and abd/ pelvis    Thank you for involving us in the care of this patient. We will continue to follow along with you.      Marzena Shelby MD   [facility]  [phone]

## 2021-08-21 NOTE — CONSULTS
Consultation - Infectious Disease  Dylon Trevizo,  1950, MRN [id number]    Admitting Dx: End-stage renal disease on hemodialysis (H) [N18.6, Z99.2]    PCP: Mc Gomez, [phone]   Code status:  No Order       Extended Emergency Contact Information  Primary Emergency Contact: Jeanine Velez  Mobile Phone: [phone]  Relation: Significant other  Secondary Emergency Contact: Chaya Rosenberg  Home Phone: [phone]  Mobile Phone: [phone]  Relation: Daughter       ASSESSMENT   1. Suspected sepsis -- high fever, tachycardia, leukocytosis. Based on pain at HD catheter site, suspect infected catheter.   2. Dialysis access difficulty -- reports one port at HD cath not working, has pain at site. PD catheter causes pain if used, not looking inflamed.   3. S/p PD catheter removal , new PD catheter placed .  culture with enterococcus. Didn't have antibiotic that would treat this, but previous site not inflamed and he does not have peritoneal signs. PD fluid sent for culture.   4. Ceftriaxone allergy -- rash; sulfa and Augmentin intolerance.   5. ESRD, DM  6. Lung abnormalities seen on CT -- suggesting chronic infection such as OPAL. Not related to current problems. He lacks productive cough and otherwise pulmonary symptoms are related to fluid status. Doubt this is a significant findings at this point. Can test sputum if he is able but would not pursue bronchoscopy at this point. Can monitor on serial CT over months to years. TB not suspected, no need for respiratory isolation.   Principal Problem:    Sepsis without acute organ dysfunction, due to unspecified organism (H)  Active Problems:    Sinus tachycardia    End-stage renal disease on hemodialysis (H)    Fever, unspecified fever cause    Leukocytosis, unspecified type       PLAN   Continue vancomycin and pip/tazo pending cultures of blood and PD fluid.   If bacteremic will need HD catheter removed  Check sputum for AFB if able to  look for OPAL, but not needing to have induced sputum.     Brennan Wayne MD  [facility]  [phone] [facility] paging  ______________________________________________________________________        Reason For Consult: Sepsis, question of OPAL     HPI    We have been requested by Dr. Pressley to evaluate Dylon Trevizo who is a 70 year old year old male for the above. He has history of ESRD on HD, DM, presented to ER with AMS and fever.    Had been to [facility] ER 8/18 with dyspnea feeling like fluid in lungs, CXR clear, WBC elevated on prednisone since 8/16, advised to follow up with dialysis.     Was found by significant other 8/20 PM confused.      Has had dialysis access difficulties:  Had PD catheter removed 7/13 for tunnel site infection. Treated with cephalexin 7/10, then switched to cipro and dicloxacillin 7/12. Culture of exit site grew amp-sensitive enterococcus, and corynebacteria. Had new PD catheter placed 7/29. He has pain when this has been used.     HD catheter placed 7/13. States one of the ports is not working a week ago. Catheter site is painful x2 weeks and reports fever x 2 weeks.     Has missed HD sessions. Had cough the other day lasting 24 hours. Normally doesn't have significant cough.              Medical History  Past Medical History:   Diagnosis Date     Anxiety and depression      Asthma, severe persistent, poorly-controlled 1958     ESRD (end stage renal disease) on dialysis (H)      HLD (hyperlipidemia)      Hypertension      IVY (obstructive sleep apnea)      Type 2 diabetes mellitus (H) 09/01/1980    Surgical History  He  has a past surgical history that includes appendectomy (1970's); Head and neck surgery (as child); colonoscopy (2018); Thoracic surgery; appendectomy; and Pr Lap Insertion Tunneled Intraperitoneal Catheter (N/A, 10/11/2019).   Social History  Reviewed, and he  reports that he quit smoking about 22 years ago. He has never used  smokeless tobacco. He reports current alcohol use. He reports previous drug use. no international travel. No exposure to TB known.    Allergies  Allergies   Allergen Reactions     Ace Inhibitors Unknown and Other (See Comments)     Hyperkalemia  Hyperkalemia  hyperkalemia       Losartan Unknown and Other (See Comments)     hyperkalemia  Hyperkalemia  hyperkalemia       Tramadol Other (See Comments) and Shortness Of Breath     SOB  Dyspnea       Aminophylline Nausea and Vomiting and Unknown     Nausea/vomiting       Folic Acid-Vit B6-Vit B12 Unknown     Unknown  Other reaction(s): Unknown  Unknown       Sulfamethoxazole-Trimethoprim Nausea and Vomiting and Nausea     Nausea/vomiting       Theophylline Nausea and Vomiting     Nausea/vomiting       Vitamin B12 Unknown     unknown  Other reaction(s): *Unknown       Ceftriaxone Rash     Came in with strep, rash after eeceiving rocephin, localized to his back only-c/o of itching  Came in with strep, rash after eeceiving rocephin, localized to his back only-c/o of itching  Rash/itching  Came in with strep, rash after eeceiving rocephin, localized to his back only-c/o of itching  Rash/itching  Came in with strep, rash after eeceiving rocephin, localized to his back only-c/o of itching  Tolerated cefazolin 10/2018  Came in with strep, rash after eeceiving rocephin, localized to his back only-c/o of itching       Clavulanic Acid Nausea and Rash     aka  AUGMENTIN  (NAUSEA)  aka  AUGMENTIN  (NAUSEA)  Unknown  aka  AUGMENTIN  (NAUSEA)  Unknown  aka  AUGMENTIN  (NAUSEA)  Other reaction(s): Nausea Only  aka  AUGMENTIN  (NAUSEA)  aka  AUGMENTIN  (NAUSEA)       Hydralazine Muscle Pain (Myalgia) and Rash     Muscle aches, severe cramps, constant, felt like RA in hands  Muscle aches; severe cramps, felt like RA in hands, constant   myalgia      Family History  family history includes Heart Disease in his brother, father, and mother; Lung Cancer in his brother, father, and sister.     Psychosocial Needs  Social History     Social History Narrative     Not on file     Additional psychosocial needs reviewed per nursing assessment.     Lives with wife in [place]. 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     Prior to Admission Medications   (Not in a hospital admission)         Anti-infectives: pip/tazo 8/20-  Vancomycin 8/21-  Cultures: 8/20 blood pending  PD fluid culture pending -- gram stain with No PMNs, No organisms.   Imaging: reviewed images of CT          Review of Systems:  All other systems negative in detail except what is noted above. Physical Exam:  Temp:  [98.9  F (37.2  C)-104.7  F (40.4  C)] 98.9  F (37.2  C)  Pulse:  [] 80  Resp:  [17-50] 21  BP: ()/(51-76) 154/70  SpO2:  [91 %-100 %] 93 %    GENERAL:  In mild distress from pain, is alert and oriented to person, place, time.  EYES: No conjunctival injection, pupils equally reactive to light  HEAD, EARS, NOSE, MOUTH, AND THROAT: Nontraumatic, mouth without oral ulcers.   RESPIRATORY: Clear breath sounds to auscultation bilaterally.   CARDIOVASCULAR: Regular rate and rhythm, normal S1 and S2, no murmurs, rubs, or gallops.  ABDOMEN: Soft, tender around midline and also at PD catheter site -- site not inflamed.   MUSCULOSKELETAL: No synovitis.  LYMPHATIC: No lymphadenopathy.  SKIN/HAIR/NAILS: No rashes, no signs of peripheral emboli.  NEUROLOGIC: Grossly intact.  HD catheter tender at entry site not along tunneled catheter.        Pertinent Labs         Recent Labs   Lab 08/20/21  2245 08/20/21  2243    139   CO2 23 21*   BUN 39* 38*       Lab Results   Component Value Date    ALT <9 08/20/2021    AST 14 08/20/2021    ALKPHOS 70 08/20/2021        Pertinent Radiology  Radiology Results: Reviewed  XR Chest Port 1 View    Result Date: 8/20/2021  EXAM: XR CHEST PORT 1 VIEW LOCATION: [facility] DATE/TIME: 8/20/2021 10:41 PM INDICATION: Fever COMPARISON: 08/18/2021.     IMPRESSION: Tunneled right  internal jugular dialysis catheter terminates in the SVC. Poststernotomy. Normal heart size and pulmonary vascularity. Mild atelectasis or scarring of the lung bases. Lungs otherwise clear. No pleural fluid or pneumothorax.    Abd/pelvis CT,  IV  contrast only TRAUMA / AAA    Result Date: 8/21/2021  EXAM: CT ABDOMEN PELVIS W CONTRAST, CT CHEST PULMONARY EMBOLISM W CONTRAST LOCATION: [facility] DATE/TIME: 8/21/2021 2:20 AM INDICATION: Sepsis, fever, abdominal pain, tachycardia, altered mental status. COMPARISON: Chest radiograph 08/20/2021. CT abdomen and pelvis 10/09/2020. Noncontrast CT chest 11/21/2019. TECHNIQUE: CT chest pulmonary angiogram and routine CT abdomen pelvis with IV contrast. Arterial phase through the chest and venous phase through the abdomen and pelvis. Multiplanar reformats and MIP reconstructions were performed. Dose reduction techniques were used. CONTRAST: isovue 370 100ml FINDINGS: ANGIOGRAM CHEST: No aortic aneurysm or dissection. No pulmonary embolus. LUNGS AND PLEURA: Compared to 11/21/2019, there has been progression in reticulonodular interstitial infiltrate which affects all pulmonary lobes. The findings are most pronounced in the upper lobes, right more than left. Chronic subpleural scarring in the right upper lobe. No pleural fluid or pneumothorax. MEDIASTINUM/AXILLAE: Poststernotomy. A few top normal sized mediastinal lymph nodes. CORONARY ARTERY CALCIFICATION: Severe. HEPATOBILIARY: Small right hepatic cyst. Normal gallbladder and bile ducts. PANCREAS: Normal. SPLEEN: Normal. ADRENAL GLANDS: Normal. KIDNEYS/BLADDER: Tiny benign left renal cysts. Normal right kidney and bladder. BOWEL: Normal. LYMPH NODES: Normal. VASCULATURE: Mild aortoiliac atherosclerosis. PELVIC ORGANS: Normal. MUSCULOSKELETAL: Peritoneal dialysis catheter enters the ventral pelvic wall and terminates in the intraperitoneal space of the central pelvis superior to the urinary bladder.  Stable 3.2 cm calcified nodule in the soft tissues medial to the left glenohumeral joint has a benign appearance (image 26 of series 2). 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 Degenerative changes of the spine.     IMPRESSION: 1.  No pulmonary embolus. 2.  Compared to 11/21/2019, progression in chronic bilateral reticulonodular interstitial infiltrates likely on the basis of atypical small airway infection such as OPAL. 3.  No acute process in the abdomen or pelvis.    CT Chest Pulmonary Embolism w Contrast    Result Date: 8/21/2021  EXAM: CT ABDOMEN PELVIS W CONTRAST, CT CHEST PULMONARY EMBOLISM W CONTRAST LOCATION: [facility] DATE/TIME: 8/21/2021 2:20 AM INDICATION: Sepsis, fever, abdominal pain, tachycardia, altered mental status. COMPARISON: Chest radiograph 08/20/2021. CT abdomen and pelvis 10/09/2020. Noncontrast CT chest 11/21/2019. TECHNIQUE: CT chest pulmonary angiogram and routine CT abdomen pelvis with IV contrast. Arterial phase through the chest and venous phase through the abdomen and pelvis. Multiplanar reformats and MIP reconstructions were performed. Dose reduction techniques were used. CONTRAST: isovue 370 100ml FINDINGS: ANGIOGRAM CHEST: No aortic aneurysm or dissection. No pulmonary embolus. LUNGS AND PLEURA: Compared to 11/21/2019, there has been progression in reticulonodular interstitial infiltrate which affects all pulmonary lobes. The findings are most pronounced in the upper lobes, right more than left. Chronic subpleural scarring in the right upper lobe. No pleural fluid or pneumothorax. MEDIASTINUM/AXILLAE: Poststernotomy. A few top normal sized mediastinal lymph nodes. CORONARY ARTERY CALCIFICATION: Severe. HEPATOBILIARY: Small right hepatic cyst. Normal gallbladder and bile ducts. PANCREAS: Normal. SPLEEN: Normal. ADRENAL GLANDS: Normal. KIDNEYS/BLADDER: Tiny benign left renal cysts. Normal right kidney and bladder.  BOWEL: Normal. LYMPH NODES: Normal. VASCULATURE: Mild aortoiliac atherosclerosis. PELVIC ORGANS: Normal. MUSCULOSKELETAL: Peritoneal dialysis catheter enters the ventral pelvic wall and terminates in the intraperitoneal space of the central pelvis superior to the urinary bladder. Stable 3.2 cm calcified nodule in the soft tissues medial to the left glenohumeral joint has a benign appearance (image 26 of series 2). 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 Degenerative changes of the spine.     IMPRESSION: 1.  No pulmonary embolus. 2.  Compared to 11/21/2019, progression in chronic bilateral reticulonodular interstitial infiltrates likely on the basis of atypical small airway infection such as OPAL. 3.  No acute process in the abdomen or pelvis.

## 2021-08-21 NOTE — PHARMACY-VANCOMYCIN DOSING SERVICE
"Pharmacy Vancomycin Initial Note  Date of Service 2021  Patient's  1950  70 year old, male    Indication: Sepsis    Current estimated CrCl = Estimated Creatinine Clearance: 15.5 mL/min (A) (based on SCr of 5.32 mg/dL (H)).    Creatinine for last 3 days  2021: 10:43 PM Creatinine 5.39 mg/dL; 10:45 PM Creatinine 5.32 mg/dL    Recent Vancomycin Level(s) for last 3 days  No results found for requested labs within last 72 hours.      Vancomycin IV Administrations (past 72 hours)                   vancomycin 1500 mg in 0.9% NaCl 250 ml intermittent infusion 1,500 mg (mg) 1,500 mg New Bag 21 0106                Nephrotoxins and other renal medications (From now, onward)    Start     Dose/Rate Route Frequency Ordered Stop    21 0958  vancomycin place ornelas - receiving intermittent dosing      1 each Intravenous SEE ADMIN INSTRUCTIONS 21 0959      21 0900  piperacillin-tazobactam (ZOSYN) 3.375 g vial to attach to  mL bag     Note to Pharmacy: For [facility], [facility] and [facility]: For Zosyn-naive patients, use the \"Zosyn initial dose + extended infusion\" order panel.    3.375 g  over 240 Minutes Intravenous EVERY 8 HOURS 21 0856            Contrast Orders - past 72 hours (72h ago, onward)    Start     Dose/Rate Route Frequency Ordered Stop    21 0230  iopamidol (ISOVUE-370) solution 100 mL      100 mL Intravenous ONCE 21 0220 21 0240            Plan:  1. Start intermittent vancomycin.  2. Vancomycin monitoring method: Trough (Method 1 = dosing nomogram)  3. Vancomycin therapeutic monitoring goal: 15-20 mg/L  4. Pharmacy will check vancomycin levels as appropriate in 1-3 Days.    5. Serum creatinine levels will be ordered a minimum of twice weekly.      Vangie Weston, [facility]    "

## 2021-08-21 NOTE — PROGRESS NOTES
Renal seen during initiation of PD.   abd dry, some low pelvis pain with initial drain(he's empty and pd cath over bladder) subsided with bypassing drain and starting fill.   Plan CCPD, 1 liter fill, 5 cycles.   Marzena Shelby MD  [facility]  [phone]

## 2021-08-21 NOTE — ED PROVIDER NOTES
EMERGENCY DEPARTMENT ENCOUNTER      NAME: Dylon Trevizo  AGE: 70 year old male  YOB: 1950  MRN: [id number]  EVALUATION DATE & TIME: 8/20/2021 10:08 PM    PCP: Usha Charlton    ED PROVIDER: Sarika Lopez MD    Chief Complaint   Patient presents with     Altered Mental Status     Fever         FINAL IMPRESSION:  1. Sepsis without acute organ dysfunction, due to unspecified organism (H)    2. Fever, unspecified fever cause    3. Sinus tachycardia    4. Leukocytosis, unspecified type    5. End-stage renal disease on hemodialysis (H)          ED COURSE & MEDICAL DECISION MAKING:    Pertinent Labs & Imaging studies reviewed. (See chart for details)  70 year old male with history of DM, HTN, ESRD on HD, asthma who presents to the Emergency Department for evaluation of weakness, fever and cough.  Patient arrives febrile, tachycardic.  He seems somewhat confused about historical questions.  No nuchal rigidity or meningismus but he does complain of shortness of breath cough and has some diffuse abdominal pain on exam.  Differential includes heat exhaustion, pneumonia, COVID-19, PE, bacteremia, hemodialysis line infection, peritoneal dialysis line infection, peritonitis, UTI.  Concern for electrolyte abnormality specifically hyperkalemia as this has been an issue as of late.    Patient placed on monitor, IV established and blood obtained.  Patient given 500 mL normal saline bolus and is not further volume resuscitated due to history of ESRD.  Given Tylenol for fever and empirically covered with vancomycin and Zosyn.  Cultures were ordered from peripheral stick as well as from his hemodialysis catheter.  Peritoneal dialysis catheter cell count and cultures ordered and are pending at the time this dictation.  CBC, CMP, lipase, lactate, VBG, beta hydroxybutyrate, coags, BNP, troponin, Covid swab notable for WBC of 17.6.  Lactate is however normal.  Patient on glucocorticoids which may contribute some  to leukocytosis.  Creatinine 5.32 again patient ESRD on HD.  Fever has defervesced and with this patient's mental status has improved and he is much more awake, coherent, conversational.  Urinalysis pending at time of dictation.  CT PE study and CT abdomen and pelvis ending at time of dictation.  Patient signed out to Dr. Nelson awaiting results of same for ultimate admission.      ED Course as of Aug 21 0103   Fri Aug 20, 2021   2309 leukocytosis   WBC(!): 17.6   2310 ESRD on HD   Creatinine(!): 5.32     10:09 PM I met with the patient to gather history and to perform my initial exam. I discussed the plan for care while in the Emergency Department. PPE (gloves, glasses, N95 mask, surgical mask, and face shield) was worn during patient encounters.   10:26 PM I talked with patient's significant other to gain a more detailed history.       At the conclusion of the encounter I discussed the results of all of the tests and the disposition. The questions were answered. The patient or family acknowledged understanding and was agreeable with the care plan.    CRITICAL CARE:  Critical Care  Performed by: Sarika Lopez MD  Authorized by: Sarika Lopez MD     Total critical care time: 62 minutes  Criticalcare time was exclusive of separately billable procedures and treating other patients.  Critical care was necessary to treat or prevent imminent or life-threatening deterioration of the following conditions: Sepsis, cardiopulmonary decompensation, death, disability  Critical care was time spent personally by me on the following activities: development of treatment plan with patient or surrogate, discussions with consultants, examination of patient, evaluation of patient's response totreatment, obtaining history from patient or surrogate, ordering and performing treatments and interventions, ordering and review of laboratory studies, ordering and review of radiographic studies and re-evaluation ofpatient's condition, this  excludes any separately billable procedures.      MEDICATIONS GIVEN IN THE EMERGENCY:  Medications   sodium chloride (PF) 0.9% PF flush 3 mL (has no administration in time range)   sodium chloride (PF) 0.9% PF flush 3 mL (has no administration in time range)   vancomycin 1500 mg in 0.9% NaCl 250 ml intermittent infusion 1,500 mg (has no administration in time range)   0.9% sodium chloride BOLUS (0 mLs Intravenous Stopped 8/20/21 2355)   acetaminophen (TYLENOL) tablet 975 mg (975 mg Oral Given 8/20/21 2230)   piperacillin-tazobactam (ZOSYN) 3.375 g vial to attach to  mL bag (0 g Intravenous Stopped 8/20/21 2355)   albuterol (PROAIR HFA/PROVENTIL HFA/VENTOLIN HFA) 108 (90 Base) MCG/ACT inhaler 6 puff (6 puffs Inhalation Given 8/20/21 2339)       NEW PRESCRIPTIONS STARTED AT TODAY'S ER VISIT  New Prescriptions    No medications on file          =================================================================    HPI    Patient information was obtained from: EMS and Significant Other    Use of Intrepreter: N/A       Dylon Trevizo is a 70 year old male with pertinent medical history of DM II, HTN, CKD stage V (MWF dialysis), who presents by EMS for evaluation of altered mental status and fever.     Per chart review, patient seen at  [facility] Emergency Department on 8/18 (~2 days ago) for evaluation of shortness of breath. Patient had only half and run on (8/13)and then missed the next dialysis (8/16) prior to the visit and he normally is MWF. He had 2 L taken off on 8/13 (~7 days ago). Chest X-ray showed no evidence of pneumonia. BMP showed hyperkalemia with potassium of 6.3. IV calcium, insulin, and glucose were administered. CBC showed elevated white blood count. Patient had been on prednisone 40 mg since 8/16 (~4 days ago). Patient was discharged home with plan to complete a dialysis run the next day.      History Limited t Per Patient Due to Patient's Altered Mental Status    Per patient, he states he  feels weak, fatigued, and warm. He endorses coughing and vague chest pain. Patient is alert to person, place, and time, but gets confused to historic timeline.      Per EMS, patient's significant other came home this evening around 2030 (~2 hours ago) and found her  to be confused. Patient has had an infection to peritoneal dialysis port recently and has been on antibiotics.    Per significant other, he has been having difficulty with his tunnel vascular catheter working, so he has been unable to complete his dialysis, which landed patient in the ED at [facility] on 8/18 (~2 days ago). They treated patient for lung infection and got steroids. In addition he has had a high potassium. He was able to complete dialysis 8/17, 8/18, and 8/19, but they had trouble getting enough volume off. Today patient was outside mowing the lawn today, but is unsure of how long he stayed outside. This afternoon when he came back inside patient reported feeling warm and was confused. She states he suddenly started to decompensate and it got worse through the night. He has peritoneal dialysis catheter that was recently placed resulting, which patient has tried to use, but it was giving him pain, so they told him to give him time. Patient has been coughing, but no hemoptysis. She states he received his COVID vaccines.     REVIEW OF SYSTEMS  LIMITED ROS Due to Patient's Altered Mental Status  Constitutional:  Denies chills, weight loss or weakness. Endorses fevers.   Eyes:  No pain, discharge, redness  HENT:  Denies sore throat, ear pain, congestion  Respiratory: No SOB, wheezes. Endorses cough  Cardiovascular:  No palpitations. Endorses CP  GI:  Denies abdominal pain, nausea, vomiting, diarrhea  : Denies dysuria, denies hematuria  Musculoskeletal:  Denies any new muscle/joint pain, swelling or loss of function.  Skin:  Denies rash, pallor  Neurologic:  Denies headache, focal weakness or sensory changes. Endorses confusion.  Lymph:  "Denies swollen nodes    All other systems negative unless noted in HPI.      PAST MEDICAL HISTORY:  Past Medical History:   Diagnosis Date     Anxiety and depression      Asthma, severe persistent, poorly-controlled 1958     CKD (chronic kidney disease) stage 5, GFR less than 15 ml/min (H)      Hypertension      Type 2 diabetes mellitus (H) 1980's       PAST SURGICAL HISTORY:  Past Surgical History:   Procedure Laterality Date     APPENDECTOMY  1970's     APPENDECTOMY       COLONOSCOPY  2018    [facility]     HEAD & NECK SURGERY  as child    \"tumor removed x 2\"     [place] LAP INSERTION TUNNELED INTRAPERITONEAL CATHETER N/A 10/11/2019    Procedure: INSERTION, CATHETER, PERITONEAL, LAPAROSCOPIC, LYSIS OF ADHESIONS;  Surgeon: Barrie Britt MD;  [facility];  Service: General     THORACIC SURGERY         CURRENT MEDICATIONS:    Prior to Admission Medications   Prescriptions Last Dose Informant Patient Reported? Taking?   HYDROmorphone (DILAUDID) 2 MG tablet   Yes No   Sig: Take 2 mg by mouth   LORazepam (ATIVAN) 1 MG tablet   Yes No   Sig: Take 1 mg by mouth   OLANZapine (ZYPREXA) 5 MG tablet   Yes No   Sig: Take 2.5 mg by mouth   ONETOUCH ULTRA test strip   Yes No   PARoxetine (PAXIL) 20 MG tablet   Yes No   Sig: paroxetine 20 mg tablet   Respiratory Therapy Supplies (AIRS DISPOSABLE NEBULIZER) KIT   Yes No   Sig: Neb machine   Sharps Container (SHARPS-A-GATOR LOCKING BRACKET) MISC   Yes No   Sig: CPAP, heated humidifier, mask, headgear, filters and tubing. For home use. Pressure: 10   Length of Need: 99   acetaminophen (TYLENOL) 325 MG tablet   Yes No   Sig: Take 325 mg by mouth   albuterol (VENTOLIN HFA) 108 (90 Base) MCG/ACT inhaler   Yes No   Sig: Ventolin HFA 90 mcg/actuation aerosol inhaler   amLODIPine (NORVASC) 10 MG tablet   Yes No   Sig: amlodipine 10 mg tablet   aspirin (ASA) 81 MG tablet   Yes No   Sig: Take 81 mg by mouth   atorvastatin (LIPITOR) 20 MG tablet   Yes No   Sig: Take 10 mg by mouth "   betamethasone valerate (VALISONE) 0.1 % external lotion   Yes No   blood glucose (FREESTYLE TEST STRIPS) test strip   Yes No   Sig: blood sugar diagnostic strips   Use one strip to check 3 times daily.   blood glucose (ONETOUCH ULTRA) test strip   Yes No   Sig: USE six times a day. Use as directed. Pharmacy dispense brand based on insurance.   blood glucose (ONETOUCH ULTRA) test strip   Yes No   Sig: USE ONE STRIP TO CHECK 3 TIMES DAILY.   budesonide-formoterol (SYMBICORT) 160-4.5 MCG/ACT Inhaler   Yes No   Sig: Inhale 2 puffs into the lungs   carvedilol (COREG) 12.5 MG tablet   Yes No   Sig: Take 12.5 mg by mouth   cetirizine (ZYRTEC ALLERGY) 10 MG tablet   Yes No   Sig: Zyrtec 10 mg tablet   Take 0.5 tablets 3 times a week by oral route as directed.   chlorthalidone (HYGROTON) 25 MG tablet   Yes No   Sig: Take 25 mg by mouth 2 times daily   clindamycin (CLEOCIN T) 1 % external solution   Yes No   cyclobenzaprine (FLEXERIL) 10 MG tablet   Yes No   Sig: cyclobenzaprine 10 mg tablet   diclofenac (VOLTAREN) 1 % topical gel   Yes No   Sig: Apply topically to affected area(s) 4 times daily. Apply 2g up to 4x daily   docusate sodium (COLACE) 100 MG capsule   Yes No   Sig: Take 100 mg by mouth   doxycycline monohydrate (MONODOX) 100 MG capsule   Yes No   famotidine (PEPCID) 20 MG tablet   Yes No   Sig: Take 20 mg by mouth   fluticasone (FLONASE) 50 MCG/ACT nasal spray   Yes No   Sig: fluticasone propionate 50 mcg/actuation nasal spray,suspension   fluticasone-vilanterol (BREO ELLIPTA) 200-25 MCG/INH inhaler   Yes No   Sig: Inhale 1 puff into the lungs   furosemide (LASIX) 40 MG tablet   Yes No   Sig: Take 40 mg by mouth   gabapentin (NEURONTIN) 100 MG capsule   Yes No   Sig: gabapentin 100 mg capsule   glucagon 1 MG SOLR injection   Yes No   Sig: Inject 1 mg into the muscle   haloperidol (HALDOL) 5 MG tablet   Yes No   Sig: Take 2.5-5 mg by mouth   ipratropium - albuterol 0.5 mg/2.5 mg/3 mL (DUONEB) 0.5-2.5 (3) MG/3ML  neb solution   Yes No   Sig: 3 mLs every 6 hours   lisinopril (PRINIVIL/ZESTRIL) 40 MG tablet   Yes No   Sig: Take 40 mg by mouth 2 times daily   loratadine (CLARITIN) 10 MG tablet   Yes No   Sig: Take 10 mg by mouth every 48 hours   melatonin 3 MG tablet   Yes No   Sig: Take 3 mg by mouth   ondansetron (ZOFRAN-ODT) 4 MG ODT tab   Yes No   Sig: Place 4 mg under the tongue   pantoprazole (PROTONIX) 40 MG EC tablet   Yes No   Sig: Take 40 mg by mouth   polyethylene glycol (MIRALAX/GLYCOLAX) packet   Yes No   Sig: Take 17 g by mouth   senna (SENOKOT) 8.6 MG tablet   Yes No   Sig: Take 1 tablet by mouth   sevelamer carbonate, RENVELA, 0.8 GM PACK Packet   Yes No   Sig: Take 0.8 g by mouth   sodium bicarbonate 325 MG tablet   Yes No   Sig: Take 325 mg by mouth   sodium polystyrene (KAYEXALATE) powder   Yes No   Sig: Take 15g by mouth once daily x14 days, then recheck lab work.   vitamin A & D (BABY) external ointment   Yes No   vitamin D2 (ERGOCALCIFEROL) 24812 units (1250 mcg) capsule   Yes No      Facility-Administered Medications: None       ALLERGIES:  Allergies   Allergen Reactions     Ace Inhibitors Unknown and Other (See Comments)     Hyperkalemia  Hyperkalemia  hyperkalemia       Losartan Unknown and Other (See Comments)     hyperkalemia  Hyperkalemia  hyperkalemia       Tramadol Other (See Comments) and Shortness Of Breath     SOB  Dyspnea       Aminophylline Nausea and Vomiting and Unknown     Nausea/vomiting       Folic Acid-Vit B6-Vit B12 Unknown     Unknown  Other reaction(s): Unknown  Unknown       Sulfamethoxazole-Trimethoprim Nausea and Vomiting and Nausea     Nausea/vomiting       Theophylline Nausea and Vomiting     Nausea/vomiting       Vitamin B12 Unknown     unknown  Other reaction(s): *Unknown       Ceftriaxone Rash     Came in with strep, rash after eeceiving rocephin, localized to his back only-c/o of itching  Came in with strep, rash after eeceiving rocephin, localized to his back only-c/o of  itching  Rash/itching  Came in with strep, rash after eeceiving rocephin, localized to his back only-c/o of itching  Rash/itching  Came in with strep, rash after eeceiving rocephin, localized to his back only-c/o of itching  Tolerated cefazolin 10/2018  Came in with strep, rash after eeceiving rocephin, localized to his back only-c/o of itching       Clavulanic Acid Nausea and Rash     aka  AUGMENTIN  (NAUSEA)  aka  AUGMENTIN  (NAUSEA)  Unknown  aka  AUGMENTIN  (NAUSEA)  Unknown  aka  AUGMENTIN  (NAUSEA)  Other reaction(s): Nausea Only  aka  AUGMENTIN  (NAUSEA)  aka  AUGMENTIN  (NAUSEA)       Hydralazine Muscle Pain (Myalgia) and Rash     Muscle aches, severe cramps, constant, felt like RA in hands  Muscle aches; severe cramps, felt like RA in hands, constant   myalgia         FAMILY HISTORY:  Family History   Problem Relation Age of Onset     Lung Cancer Father      Heart Disease Father      Lung Cancer Sister      Lung Cancer Brother      Heart Disease Brother      Heart Disease Mother        SOCIAL HISTORY:  Social History     Tobacco Use     Smoking status: Former Smoker     Quit date: 1999     Years since quittin.6     Smokeless tobacco: Never Used   Substance Use Topics     Alcohol use: Yes     Comment: Not currently     Drug use: Not Currently        VITALS:  Patient Vitals for the past 24 hrs:   BP Temp Temp src Pulse Resp SpO2 Weight   21 2359 -- 99  F (37.2  C) Oral -- -- -- --   21 2300 109/52 -- -- 116 21 94 % --   21 2245 121/58 -- -- 114 21 94 % --   21 2230 130/58 -- -- 116 17 91 % --   21 2221 111/56 (!) 104.7  F (40.4  C) Rectal 113 -- 95 % --   21 2215 113/59 -- -- 114 22 91 % 100 kg (220 lb 7.4 oz)       PHYSICAL EXAM    General Appearance: Chronically ill-appearing looks older than stated age. Warm to touch.   Head:  Normocephalic, atraumatic  Eyes:  PERRL, conjunctiva/corneas clear, EOM's intact  ENT:  membranes are moist without pallor  Neck:  Supple,  no nuchal rigidity or meningismus  Chest:  No tenderness or deformity, no crepitus. Tunneled hemodialysis catheter in right chest wall.   Cardio:  Tachycardic. Regular rhythm, no murmur/gallop/rub, 2+ pulses symmetric in all extremities  Pulm:  No respiratory distress, decreased throughout  Back:   No CVA tenderness, normal ROM  Abdomen:  Soft, diffuse abdominal tenderness, non distended,no rebound or guarding. Peritoneal catheter in abdomen.   Extremities: Extremities atraumatic.  No peripheral edema  Skin:  Skin warm, dry, no rashes  Neuro:  Alert and oriented ×3, but confused about historical questions, moving all extremities, no gross sensory defects     RADIOLOGY/LABS:  Reviewed all pertinent imaging. Please see official radiology report. All pertinent labs reviewed and interpreted.    Results for orders placed or performed during the hospital encounter of 08/20/21   XR Chest Port 1 View    Impression    IMPRESSION: Tunneled right internal jugular dialysis catheter terminates in the SVC. Poststernotomy. Normal heart size and pulmonary vascularity. Mild atelectasis or scarring of the lung bases. Lungs otherwise clear. No pleural fluid or pneumothorax.   Comprehensive metabolic panel   Result Value Ref Range    Sodium 138 136 - 145 mmol/L    Potassium 4.4 3.5 - 5.0 mmol/L    Chloride 104 98 - 107 mmol/L    Carbon Dioxide (CO2) 23 22 - 31 mmol/L    Anion Gap 11 5 - 18 mmol/L    Urea Nitrogen 39 (H) 8 - 22 mg/dL    Creatinine 5.32 (H) 0.70 - 1.30 mg/dL    Calcium 8.6 8.5 - 10.5 mg/dL    Glucose 117 70 - 125 mg/dL    Alkaline Phosphatase 70 45 - 120 U/L    AST 14 0 - 40 U/L    ALT <9 0 - 45 U/L    Protein Total 6.9 6.0 - 8.0 g/dL    Albumin 3.2 (L) 3.5 - 5.0 g/dL    Bilirubin Total 0.6 0.0 - 1.0 mg/dL    GFR Estimate 10 (L) >60 mL/min/1.73m2   Lactic acid whole blood   Result Value Ref Range    Lactic Acid 1.1 0.7 - 2.0 mmol/L   Blood gas venous   Result Value Ref Range    pH Venous 7.44 7.35 - 7.45    pCO2 Venous  40 35 - 50 mm Hg    pO2 Venous 36 25 - 47 mm Hg    Bicarbonate Venous 26 24 - 30 mmol/L    Base Excess/Deficit (+/-) 3.0   mmol/L    Oxyhemoglobin Venous 69.6 (L) 70.0 - 75.0 %    O2 Sat, Venous 70.9 70.0 - 75.0 %   Result Value Ref Range    Lipase 41 0 - 52 U/L   Result Value Ref Range    INR 1.11 0.90 - 1.15   Partial thromboplastin time   Result Value Ref Range    aPTT 29 22 - 38 Seconds   Symptomatic COVID-19 Virus (Coronavirus) by PCR Nasopharyngeal    Specimen: Nasopharyngeal; Swab   Result Value Ref Range    SARS CoV2 PCR Negative Negative   CBC with platelets and differential   Result Value Ref Range    WBC Count 17.6 (H) 4.0 - 11.0 10e3/uL    RBC Count 3.86 (L) 4.40 - 5.90 10e6/uL    Hemoglobin 11.5 (L) 13.3 - 17.7 g/dL    Hematocrit 36.8 (L) 40.0 - 53.0 %    MCV 95 78 - 100 fL    MCH 29.8 26.5 - 33.0 pg    MCHC 31.3 (L) 31.5 - 36.5 g/dL    RDW 15.1 (H) 10.0 - 15.0 %    Platelet Count 225 150 - 450 10e3/uL    % Neutrophils 79 %    % Lymphocytes 11 %    % Monocytes 9 %    % Eosinophils 0 %    % Basophils 0 %    % Immature Granulocytes 1 %    NRBCs per 100 WBC 0 <1 /100    Absolute Neutrophils 14.1 (H) 1.6 - 8.3 10e3/uL    Absolute Lymphocytes 2.0 0.8 - 5.3 10e3/uL    Absolute Monocytes 1.5 (H) 0.0 - 1.3 10e3/uL    Absolute Eosinophils 0.0 0.0 - 0.7 10e3/uL    Absolute Basophils 0.0 0.0 - 0.2 10e3/uL    Absolute Immature Granulocytes 0.1 (H) <=0.0 10e3/uL    Absolute NRBCs 0.0 10e3/uL   Extra Red Top Tube   Result Value Ref Range    Hold Specimen JIC    Ketone Beta-Hydroxybutyrate Quantitative   Result Value Ref Range    Ketone (Beta-Hydroxybutyrate) Quantitative <0.10 <=0.3 mmol/L   Troponin I (now)   Result Value Ref Range    Troponin I 0.04 0.00 - 0.29 ng/mL   B-Type Natriuretic Peptide ([facility] Only)   Result Value Ref Range     (H) 0 - 65 pg/mL       EKG:  Performed at: 23:08    Impression: Sinus tachycardia    Rate: 113  Rhythm: Sinus tachycardia  Axis: 33  FL Interval: 138  QRS Interval: 90  QTc  Interval: 438  ST Changes: None  Comparison: October 16, 2019  I have independently reviewed and interpreted the EKG(s) documented above.    PROCEDURES:  None    The creation of this record is based on the scribe s observations of the work being performed by Sarika Lopez MD and the provider s statements to them. It was created on his behalf by Lisbeth Cordoba, a trained medical scribe. This document has been checked and approved by the attending provider.    Sarika Lopez MD  Emergency Medicine  [facility] EMERGENCY DEPARTMENT  [address] [place] [id number] [phone]  Dept: [phone]       Sarika Lopez MD  08/21/21 0103

## 2021-08-21 NOTE — PROCEDURES
"Peritoneal Dialysis note:    PD order received from Dr. Shelby    Total Volume : 5000 ml  Fill Volume: 1000 ml  Total number of cycle: 5  Total time on machine: 7 hour  Last fill volume: 0    Two 5 L bags, strength 1.5 %.  Access dressing done by Dr. Shelby.     Initial drain \"painful\", drain bypass done. Dr. Shelby present in the room.   Tx started at 1440.   Post report given to Teresa BENITEZ RN.     "

## 2021-08-21 NOTE — ED PROVIDER NOTES
70-year-old dialysis dependent patient evaluated by my colleague found to be septic without organ dysfunction or lactic acid elevation and covered with vancomycin and Zosyn for presumed SBP source as he has an indwelling peritoneal dialysis catheter and some abdominal pain.  He signed out pending hospitalization after CT imaging chest and abdomen.    CT PE run shows no PE however findings concerning for OPAL are noted.  CT abdomen pelvis otherwise reassuring.  Cultures from abdominal port site are pending.    There are no beds available and she will require transfer versus boarding.     Pt was updated with all results and discussed plan and amenable as above. Pt discussed with hospitalist.    Vitals:    08/21/21 0200 08/21/21 0215 08/21/21 0230 08/21/21 0245   BP: 103/58  138/63 (!) 140/66   Pulse: 89 86 91 90   Resp: 24 (!) 50 18 24   Temp:       TempSrc:       SpO2: 100% 96% 97% 97%   Weight:           Results for orders placed or performed during the hospital encounter of 08/20/21   XR Chest Port 1 View    Impression    IMPRESSION: Tunneled right internal jugular dialysis catheter terminates in the SVC. Poststernotomy. Normal heart size and pulmonary vascularity. Mild atelectasis or scarring of the lung bases. Lungs otherwise clear. No pleural fluid or pneumothorax.   CT Chest Pulmonary Embolism w Contrast    Impression    IMPRESSION:  1.  No pulmonary embolus.  2.  Compared to 11/21/2019, progression in chronic bilateral reticulonodular interstitial infiltrates likely on the basis of atypical small airway infection such as OPAL.  3.  No acute process in the abdomen or pelvis.   Abd/pelvis CT,  IV  contrast only TRAUMA / AAA    Impression    IMPRESSION:  1.  No pulmonary embolus.  2.  Compared to 11/21/2019, progression in chronic bilateral reticulonodular interstitial infiltrates likely on the basis of atypical small airway infection such as OPAL.  3.  No acute process in the abdomen or pelvis.   Comprehensive  metabolic panel   Result Value Ref Range    Sodium 138 136 - 145 mmol/L    Potassium 4.4 3.5 - 5.0 mmol/L    Chloride 104 98 - 107 mmol/L    Carbon Dioxide (CO2) 23 22 - 31 mmol/L    Anion Gap 11 5 - 18 mmol/L    Urea Nitrogen 39 (H) 8 - 22 mg/dL    Creatinine 5.32 (H) 0.70 - 1.30 mg/dL    Calcium 8.6 8.5 - 10.5 mg/dL    Glucose 117 70 - 125 mg/dL    Alkaline Phosphatase 70 45 - 120 U/L    AST 14 0 - 40 U/L    ALT <9 0 - 45 U/L    Protein Total 6.9 6.0 - 8.0 g/dL    Albumin 3.2 (L) 3.5 - 5.0 g/dL    Bilirubin Total 0.6 0.0 - 1.0 mg/dL    GFR Estimate 10 (L) >60 mL/min/1.73m2   Lactic acid whole blood   Result Value Ref Range    Lactic Acid 1.1 0.7 - 2.0 mmol/L   Blood gas venous   Result Value Ref Range    pH Venous 7.44 7.35 - 7.45    pCO2 Venous 40 35 - 50 mm Hg    pO2 Venous 36 25 - 47 mm Hg    Bicarbonate Venous 26 24 - 30 mmol/L    Base Excess/Deficit (+/-) 3.0   mmol/L    Oxyhemoglobin Venous 69.6 (L) 70.0 - 75.0 %    O2 Sat, Venous 70.9 70.0 - 75.0 %   Result Value Ref Range    Lipase 41 0 - 52 U/L   Result Value Ref Range    INR 1.11 0.90 - 1.15   Partial thromboplastin time   Result Value Ref Range    aPTT 29 22 - 38 Seconds   Symptomatic COVID-19 Virus (Coronavirus) by PCR Nasopharyngeal    Specimen: Nasopharyngeal; Swab   Result Value Ref Range    SARS CoV2 PCR Negative Negative   CBC with platelets and differential   Result Value Ref Range    WBC Count 17.6 (H) 4.0 - 11.0 10e3/uL    RBC Count 3.86 (L) 4.40 - 5.90 10e6/uL    Hemoglobin 11.5 (L) 13.3 - 17.7 g/dL    Hematocrit 36.8 (L) 40.0 - 53.0 %    MCV 95 78 - 100 fL    MCH 29.8 26.5 - 33.0 pg    MCHC 31.3 (L) 31.5 - 36.5 g/dL    RDW 15.1 (H) 10.0 - 15.0 %    Platelet Count 225 150 - 450 10e3/uL    % Neutrophils 79 %    % Lymphocytes 11 %    % Monocytes 9 %    % Eosinophils 0 %    % Basophils 0 %    % Immature Granulocytes 1 %    NRBCs per 100 WBC 0 <1 /100    Absolute Neutrophils 14.1 (H) 1.6 - 8.3 10e3/uL    Absolute Lymphocytes 2.0 0.8 - 5.3 10e3/uL     Absolute Monocytes 1.5 (H) 0.0 - 1.3 10e3/uL    Absolute Eosinophils 0.0 0.0 - 0.7 10e3/uL    Absolute Basophils 0.0 0.0 - 0.2 10e3/uL    Absolute Immature Granulocytes 0.1 (H) <=0.0 10e3/uL    Absolute NRBCs 0.0 10e3/uL   Extra Red Top Tube   Result Value Ref Range    Hold Specimen JIC    Ketone Beta-Hydroxybutyrate Quantitative   Result Value Ref Range    Ketone (Beta-Hydroxybutyrate) Quantitative <0.10 <=0.3 mmol/L   Troponin I (now)   Result Value Ref Range    Troponin I 0.04 0.00 - 0.29 ng/mL   B-Type Natriuretic Peptide ( [place] Only)   Result Value Ref Range     (H) 0 - 65 pg/mL       XR Chest Port 1 View    Result Date: 8/20/2021  EXAM: XR CHEST PORT 1 VIEW LOCATION: [facility] DATE/TIME: 8/20/2021 10:41 PM INDICATION: Fever COMPARISON: 08/18/2021.     IMPRESSION: Tunneled right internal jugular dialysis catheter terminates in the SVC. Poststernotomy. Normal heart size and pulmonary vascularity. Mild atelectasis or scarring of the lung bases. Lungs otherwise clear. No pleural fluid or pneumothorax.    Abd/pelvis CT,  IV  contrast only TRAUMA / AAA    Result Date: 8/21/2021  EXAM: CT ABDOMEN PELVIS W CONTRAST, CT CHEST PULMONARY EMBOLISM W CONTRAST LOCATION: [facility] DATE/TIME: 8/21/2021 2:20 AM INDICATION: Sepsis, fever, abdominal pain, tachycardia, altered mental status. COMPARISON: Chest radiograph 08/20/2021. CT abdomen and pelvis 10/09/2020. Noncontrast CT chest 11/21/2019. TECHNIQUE: CT chest pulmonary angiogram and routine CT abdomen pelvis with IV contrast. Arterial phase through the chest and venous phase through the abdomen and pelvis. Multiplanar reformats and MIP reconstructions were performed. Dose reduction techniques were used. CONTRAST: isovue 370 100ml FINDINGS: ANGIOGRAM CHEST: No aortic aneurysm or dissection. No pulmonary embolus. LUNGS AND PLEURA: Compared to 11/21/2019, there has been progression in reticulonodular  interstitial infiltrate which affects all pulmonary lobes. The findings are most pronounced in the upper lobes, right more than left. Chronic subpleural scarring in the right upper lobe. No pleural fluid or pneumothorax. MEDIASTINUM/AXILLAE: Poststernotomy. A few top normal sized mediastinal lymph nodes. CORONARY ARTERY CALCIFICATION: Severe. HEPATOBILIARY: Small right hepatic cyst. Normal gallbladder and bile ducts. PANCREAS: Normal. SPLEEN: Normal. ADRENAL GLANDS: Normal. KIDNEYS/BLADDER: Tiny benign left renal cysts. Normal right kidney and bladder. BOWEL: Normal. LYMPH NODES: Normal. VASCULATURE: Mild aortoiliac atherosclerosis. PELVIC ORGANS: Normal. MUSCULOSKELETAL: Peritoneal dialysis catheter enters the ventral pelvic wall and terminates in the intraperitoneal space of the central pelvis superior to the urinary bladder. Stable 3.2 cm calcified nodule in the soft tissues medial to the left glenohumeral joint has a benign appearance (image 26 of series 2). 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 Degenerative changes of the spine.     IMPRESSION: 1.  No pulmonary embolus. 2.  Compared to 11/21/2019, progression in chronic bilateral reticulonodular interstitial infiltrates likely on the basis of atypical small airway infection such as OPAL. 3.  No acute process in the abdomen or pelvis.    CT Chest Pulmonary Embolism w Contrast    Result Date: 8/21/2021  EXAM: CT ABDOMEN PELVIS W CONTRAST, CT CHEST PULMONARY EMBOLISM W CONTRAST LOCATION: [facility] DATE/TIME: 8/21/2021 2:20 AM INDICATION: Sepsis, fever, abdominal pain, tachycardia, altered mental status. COMPARISON: Chest radiograph 08/20/2021. CT abdomen and pelvis 10/09/2020. Noncontrast CT chest 11/21/2019. TECHNIQUE: CT chest pulmonary angiogram and routine CT abdomen pelvis with IV contrast. Arterial phase through the chest and venous phase through the abdomen and pelvis. Multiplanar  reformats and MIP reconstructions were performed. Dose reduction techniques were used. CONTRAST: isovue 370 100ml FINDINGS: ANGIOGRAM CHEST: No aortic aneurysm or dissection. No pulmonary embolus. LUNGS AND PLEURA: Compared to 11/21/2019, there has been progression in reticulonodular interstitial infiltrate which affects all pulmonary lobes. The findings are most pronounced in the upper lobes, right more than left. Chronic subpleural scarring in the right upper lobe. No pleural fluid or pneumothorax. MEDIASTINUM/AXILLAE: Poststernotomy. A few top normal sized mediastinal lymph nodes. CORONARY ARTERY CALCIFICATION: Severe. HEPATOBILIARY: Small right hepatic cyst. Normal gallbladder and bile ducts. PANCREAS: Normal. SPLEEN: Normal. ADRENAL GLANDS: Normal. KIDNEYS/BLADDER: Tiny benign left renal cysts. Normal right kidney and bladder. BOWEL: Normal. LYMPH NODES: Normal. VASCULATURE: Mild aortoiliac atherosclerosis. PELVIC ORGANS: Normal. MUSCULOSKELETAL: Peritoneal dialysis catheter enters the ventral pelvic wall and terminates in the intraperitoneal space of the central pelvis superior to the urinary bladder. Stable 3.2 cm calcified nodule in the soft tissues medial to the left glenohumeral joint has a benign appearance (image 26 of series 2). 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 Degenerative changes of the spine.     IMPRESSION: 1.  No pulmonary embolus. 2.  Compared to 11/21/2019, progression in chronic bilateral reticulonodular interstitial infiltrates likely on the basis of atypical small airway infection such as OPAL. 3.  No acute process in the abdomen or pelvis.      ICD-10-CM    1. Sepsis without acute organ dysfunction, due to unspecified organism (H)  A41.9    2. Fever, unspecified fever cause  R50.9    3. Sinus tachycardia  R00.0    4. Leukocytosis, unspecified type  D72.829    5. End-stage renal disease on hemodialysis (H)  N18.6     Z99.2           Omar  Devan BHAKTA MD  08/21/21 0318

## 2021-08-21 NOTE — ED NOTES
Pt presents with confusion and incomprehensible words. SKIN: diaphoretic.   HEENT: normocephalic, PERRL, clear x 3.   CHEST: symmetrical rise, CEBBS, no CP or SOB.  ABD: NTND, no N&V or diarrhea.  EXT: ALEXANDRE x 4  Rest of exam unremarkable.

## 2021-08-21 NOTE — PROGRESS NOTES
Albuterol nebulizer given via mouth-piece. BS diminished with crackles throughout before and after. No change with treatment. Patient tolerated treatment well with no adverse reaction.

## 2021-08-21 NOTE — ED NOTES
Pt states he is feeling sob, requesting another neb, O2 sats 94% on 2L, EDMD updated and will order neb

## 2021-08-22 ENCOUNTER — APPOINTMENT (OUTPATIENT)
Dept: OCCUPATIONAL THERAPY | Facility: HOSPITAL | Age: 71
DRG: 907 | End: 2021-08-22
Payer: MEDICARE

## 2021-08-22 ENCOUNTER — APPOINTMENT (OUTPATIENT)
Dept: PHYSICAL THERAPY | Facility: HOSPITAL | Age: 71
DRG: 907 | End: 2021-08-22
Attending: FAMILY MEDICINE
Payer: MEDICARE

## 2021-08-22 LAB
% LINING CELLS, BODY FLUID: 1 %
ALBUMIN SERPL-MCNC: 2.5 G/DL (ref 3.5–5)
ALP SERPL-CCNC: 58 U/L (ref 45–120)
ALT SERPL W P-5'-P-CCNC: <9 U/L (ref 0–45)
ANION GAP SERPL CALCULATED.3IONS-SCNC: 11 MMOL/L (ref 5–18)
APPEARANCE FLD: ABNORMAL
AST SERPL W P-5'-P-CCNC: 12 U/L (ref 0–40)
BACTERIA UR CULT: NO GROWTH
BASOPHILS # BLD AUTO: 0 10E3/UL (ref 0–0.2)
BASOPHILS NFR BLD AUTO: 0 %
BILIRUB SERPL-MCNC: 0.6 MG/DL (ref 0–1)
BUN SERPL-MCNC: 51 MG/DL (ref 8–22)
C REACTIVE PROTEIN LHE: 23.2 MG/DL (ref 0–0.8)
CALCIUM SERPL-MCNC: 7.1 MG/DL (ref 8.5–10.5)
CHLORIDE BLD-SCNC: 107 MMOL/L (ref 98–107)
CO2 SERPL-SCNC: 21 MMOL/L (ref 22–31)
COLOR FLD: COLORLESS
CREAT SERPL-MCNC: 7.54 MG/DL (ref 0.7–1.3)
EOSINOPHIL # BLD AUTO: 0.3 10E3/UL (ref 0–0.7)
EOSINOPHIL NFR BLD AUTO: 2 %
EOSINOPHIL NFR FLD MANUAL: 10 %
ERYTHROCYTE [DISTWIDTH] IN BLOOD BY AUTOMATED COUNT: 14.9 % (ref 10–15)
FIBRINOGEN PPP-MCNC: 664 MG/DL (ref 170–490)
GFR SERPL CREATININE-BSD FRML MDRD: 7 ML/MIN/1.73M2
GLUCOSE BLD-MCNC: 110 MG/DL (ref 70–125)
GLUCOSE BLDC GLUCOMTR-MCNC: 101 MG/DL (ref 70–125)
GLUCOSE BLDC GLUCOMTR-MCNC: 104 MG/DL (ref 70–125)
GLUCOSE BLDC GLUCOMTR-MCNC: 106 MG/DL (ref 70–125)
GLUCOSE BLDC GLUCOMTR-MCNC: 144 MG/DL (ref 70–125)
HCT VFR BLD AUTO: 31 % (ref 40–53)
HGB BLD-MCNC: 9.5 G/DL (ref 13.3–17.7)
IMM GRANULOCYTES # BLD: 0.1 10E3/UL
IMM GRANULOCYTES NFR BLD: 1 %
LACTATE SERPL-SCNC: 0.6 MMOL/L (ref 0.7–2)
LYMPHOCYTES # BLD AUTO: 1.6 10E3/UL (ref 0.8–5.3)
LYMPHOCYTES NFR BLD AUTO: 12 %
LYMPHOCYTES NFR FLD MANUAL: 39 %
MCH RBC QN AUTO: 29.5 PG (ref 26.5–33)
MCHC RBC AUTO-ENTMCNC: 30.6 G/DL (ref 31.5–36.5)
MCV RBC AUTO: 96 FL (ref 78–100)
MONOCYTES # BLD AUTO: 0.8 10E3/UL (ref 0–1.3)
MONOCYTES NFR BLD AUTO: 6 %
MONOS+MACROS NFR FLD MANUAL: 9 %
NEUTROPHILS # BLD AUTO: 10.8 10E3/UL (ref 1.6–8.3)
NEUTROPHILS NFR BLD AUTO: 79 %
NEUTS BAND NFR FLD MANUAL: 41 %
NRBC # BLD AUTO: 0 10E3/UL
NRBC BLD AUTO-RTO: 0 /100
PLATELET # BLD AUTO: 199 10E3/UL (ref 150–450)
POTASSIUM BLD-SCNC: 4.3 MMOL/L (ref 3.5–5)
PROT SERPL-MCNC: 6 G/DL (ref 6–8)
RBC # BLD AUTO: 3.22 10E6/UL (ref 4.4–5.9)
RBC # FLD: ABNORMAL 10*3/UL
SODIUM SERPL-SCNC: 139 MMOL/L (ref 136–145)
VANCOMYCIN SERPL-MCNC: 13.1 MG/L
VIT B12 SERPL-MCNC: 479 PG/ML (ref 213–816)
WBC # BLD AUTO: 13.4 10E3/UL (ref 4–11)
WBC # FLD AUTO: ABNORMAL 10*3/UL

## 2021-08-22 PROCEDURE — 99233 SBSQ HOSP IP/OBS HIGH 50: CPT | Performed by: FAMILY MEDICINE

## 2021-08-22 PROCEDURE — 87040 BLOOD CULTURE FOR BACTERIA: CPT | Performed by: FAMILY MEDICINE

## 2021-08-22 PROCEDURE — 80202 ASSAY OF VANCOMYCIN: CPT | Performed by: FAMILY MEDICINE

## 2021-08-22 PROCEDURE — 86141 C-REACTIVE PROTEIN HS: CPT | Performed by: FAMILY MEDICINE

## 2021-08-22 PROCEDURE — 250N000013 HC RX MED GY IP 250 OP 250 PS 637: Performed by: FAMILY MEDICINE

## 2021-08-22 PROCEDURE — 99232 SBSQ HOSP IP/OBS MODERATE 35: CPT | Performed by: INTERNAL MEDICINE

## 2021-08-22 PROCEDURE — 97161 PT EVAL LOW COMPLEX 20 MIN: CPT | Mod: GP

## 2021-08-22 PROCEDURE — 85384 FIBRINOGEN ACTIVITY: CPT | Performed by: FAMILY MEDICINE

## 2021-08-22 PROCEDURE — 250N000009 HC RX 250: Performed by: FAMILY MEDICINE

## 2021-08-22 PROCEDURE — 97166 OT EVAL MOD COMPLEX 45 MIN: CPT | Mod: GO

## 2021-08-22 PROCEDURE — 250N000013 HC RX MED GY IP 250 OP 250 PS 637

## 2021-08-22 PROCEDURE — 36415 COLL VENOUS BLD VENIPUNCTURE: CPT | Performed by: FAMILY MEDICINE

## 2021-08-22 PROCEDURE — 97116 GAIT TRAINING THERAPY: CPT | Mod: GP

## 2021-08-22 PROCEDURE — 99233 SBSQ HOSP IP/OBS HIGH 50: CPT | Performed by: INTERNAL MEDICINE

## 2021-08-22 PROCEDURE — 97535 SELF CARE MNGMENT TRAINING: CPT | Mod: GO

## 2021-08-22 PROCEDURE — 258N000003 HC RX IP 258 OP 636: Performed by: FAMILY MEDICINE

## 2021-08-22 PROCEDURE — 90999 UNLISTED DIALYSIS PROCEDURE: CPT

## 2021-08-22 PROCEDURE — 90945 DIALYSIS ONE EVALUATION: CPT

## 2021-08-22 PROCEDURE — 83605 ASSAY OF LACTIC ACID: CPT | Performed by: FAMILY MEDICINE

## 2021-08-22 PROCEDURE — 85025 COMPLETE CBC W/AUTO DIFF WBC: CPT | Performed by: FAMILY MEDICINE

## 2021-08-22 PROCEDURE — 80053 COMPREHEN METABOLIC PANEL: CPT | Performed by: FAMILY MEDICINE

## 2021-08-22 PROCEDURE — 210N000001 HC R&B IMCU HEART CARE

## 2021-08-22 PROCEDURE — 250N000011 HC RX IP 250 OP 636: Performed by: FAMILY MEDICINE

## 2021-08-22 RX ORDER — SEVELAMER CARBONATE 800 MG/1
1600 TABLET, FILM COATED ORAL
Status: DISCONTINUED | OUTPATIENT
Start: 2021-08-22 | End: 2021-08-28 | Stop reason: HOSPADM

## 2021-08-22 RX ORDER — FUROSEMIDE 20 MG
40 TABLET ORAL DAILY
Status: DISCONTINUED | OUTPATIENT
Start: 2021-08-22 | End: 2021-08-28 | Stop reason: HOSPADM

## 2021-08-22 RX ORDER — INSULIN LISPRO 100 [IU]/ML
INJECTION, SOLUTION INTRAVENOUS; SUBCUTANEOUS
COMMUNITY

## 2021-08-22 RX ORDER — CARVEDILOL 12.5 MG/1
25 TABLET ORAL 2 TIMES DAILY WITH MEALS
Status: DISCONTINUED | OUTPATIENT
Start: 2021-08-22 | End: 2021-08-28 | Stop reason: HOSPADM

## 2021-08-22 RX ORDER — ACETAMINOPHEN 325 MG/1
650 TABLET ORAL EVERY 8 HOURS PRN
Status: DISCONTINUED | OUTPATIENT
Start: 2021-08-22 | End: 2021-08-28 | Stop reason: HOSPADM

## 2021-08-22 RX ORDER — SEVELAMER CARBONATE 800 MG/1
1600 TABLET, FILM COATED ORAL
COMMUNITY
Start: 2020-09-24

## 2021-08-22 RX ORDER — ALBUTEROL SULFATE 90 UG/1
2 AEROSOL, METERED RESPIRATORY (INHALATION) EVERY 6 HOURS PRN
Status: DISCONTINUED | OUTPATIENT
Start: 2021-08-22 | End: 2021-08-22

## 2021-08-22 RX ORDER — OLANZAPINE 2.5 MG/1
2.5 TABLET, FILM COATED ORAL AT BEDTIME
Status: DISCONTINUED | OUTPATIENT
Start: 2021-08-22 | End: 2021-08-28 | Stop reason: HOSPADM

## 2021-08-22 RX ORDER — IPRATROPIUM BROMIDE AND ALBUTEROL SULFATE 2.5; .5 MG/3ML; MG/3ML
3 SOLUTION RESPIRATORY (INHALATION) EVERY 6 HOURS PRN
Status: DISCONTINUED | OUTPATIENT
Start: 2021-08-22 | End: 2021-08-25

## 2021-08-22 RX ORDER — CETIRIZINE HYDROCHLORIDE 10 MG/1
10 TABLET ORAL DAILY
Status: DISCONTINUED | OUTPATIENT
Start: 2021-08-22 | End: 2021-08-28 | Stop reason: HOSPADM

## 2021-08-22 RX ORDER — HYDROMORPHONE HYDROCHLORIDE 2 MG/1
2 TABLET ORAL EVERY 6 HOURS PRN
Status: DISCONTINUED | OUTPATIENT
Start: 2021-08-22 | End: 2021-08-28 | Stop reason: HOSPADM

## 2021-08-22 RX ORDER — CETIRIZINE HYDROCHLORIDE 10 MG/1
10 TABLET ORAL DAILY
COMMUNITY

## 2021-08-22 RX ORDER — CYCLOBENZAPRINE HCL 5 MG
5 TABLET ORAL ONCE
Status: COMPLETED | OUTPATIENT
Start: 2021-08-22 | End: 2021-08-22

## 2021-08-22 RX ORDER — TRAZODONE HYDROCHLORIDE 50 MG/1
50 TABLET, FILM COATED ORAL
COMMUNITY
Start: 2021-05-12

## 2021-08-22 RX ORDER — BUDESONIDE AND FORMOTEROL FUMARATE DIHYDRATE 160; 4.5 UG/1; UG/1
1 AEROSOL RESPIRATORY (INHALATION) 2 TIMES DAILY PRN
Status: ON HOLD | COMMUNITY
End: 2021-08-28

## 2021-08-22 RX ORDER — TRAZODONE HYDROCHLORIDE 50 MG/1
50 TABLET, FILM COATED ORAL
Status: DISCONTINUED | OUTPATIENT
Start: 2021-08-22 | End: 2021-08-28 | Stop reason: HOSPADM

## 2021-08-22 RX ORDER — ATORVASTATIN CALCIUM 10 MG/1
10 TABLET, FILM COATED ORAL DAILY
Status: DISCONTINUED | OUTPATIENT
Start: 2021-08-22 | End: 2021-08-28 | Stop reason: HOSPADM

## 2021-08-22 RX ORDER — BUDESONIDE AND FORMOTEROL FUMARATE DIHYDRATE 160; 4.5 UG/1; UG/1
1 AEROSOL RESPIRATORY (INHALATION) 2 TIMES DAILY PRN
Status: DISCONTINUED | OUTPATIENT
Start: 2021-08-22 | End: 2021-08-28 | Stop reason: HOSPADM

## 2021-08-22 RX ORDER — HEPARIN SODIUM 5000 [USP'U]/.5ML
5000 INJECTION, SOLUTION INTRAVENOUS; SUBCUTANEOUS EVERY 8 HOURS
Status: DISCONTINUED | OUTPATIENT
Start: 2021-08-22 | End: 2021-08-28 | Stop reason: HOSPADM

## 2021-08-22 RX ADMIN — ONDANSETRON 4 MG: 2 INJECTION INTRAMUSCULAR; INTRAVENOUS at 16:36

## 2021-08-22 RX ADMIN — ACETAMINOPHEN 650 MG: 325 TABLET ORAL at 20:59

## 2021-08-22 RX ADMIN — ACETAMINOPHEN 650 MG: 325 TABLET ORAL at 08:07

## 2021-08-22 RX ADMIN — CYCLOBENZAPRINE 5 MG: 5 TABLET, FILM COATED ORAL at 11:44

## 2021-08-22 RX ADMIN — CARVEDILOL 25 MG: 12.5 TABLET, FILM COATED ORAL at 17:26

## 2021-08-22 RX ADMIN — SEVELAMER CARBONATE 1600 MG: 800 TABLET, FILM COATED ORAL at 17:26

## 2021-08-22 RX ADMIN — HEPARIN SODIUM 5000 UNITS: 10000 INJECTION, SOLUTION INTRAVENOUS; SUBCUTANEOUS at 21:01

## 2021-08-22 RX ADMIN — FUROSEMIDE 40 MG: 20 TABLET ORAL at 16:56

## 2021-08-22 RX ADMIN — PIPERACILLIN SODIUM AND TAZOBACTAM SODIUM 3.38 G: 3; .375 INJECTION, POWDER, LYOPHILIZED, FOR SOLUTION INTRAVENOUS at 10:07

## 2021-08-22 RX ADMIN — CETIRIZINE HYDROCHLORIDE 10 MG: 10 TABLET ORAL at 16:56

## 2021-08-22 RX ADMIN — PIPERACILLIN SODIUM AND TAZOBACTAM SODIUM 3.38 G: 3; .375 INJECTION, POWDER, LYOPHILIZED, FOR SOLUTION INTRAVENOUS at 21:00

## 2021-08-22 RX ADMIN — BENZOCAINE AND MENTHOL 1 LOZENGE: 15; 3.6 LOZENGE ORAL at 13:52

## 2021-08-22 RX ADMIN — ALBUTEROL SULFATE 2.5 MG: 2.5 SOLUTION RESPIRATORY (INHALATION) at 07:37

## 2021-08-22 RX ADMIN — ONDANSETRON 4 MG: 2 INJECTION INTRAMUSCULAR; INTRAVENOUS at 19:51

## 2021-08-22 RX ADMIN — VANCOMYCIN HYDROCHLORIDE 2000 MG: 5 INJECTION, POWDER, LYOPHILIZED, FOR SOLUTION INTRAVENOUS at 10:56

## 2021-08-22 RX ADMIN — ATORVASTATIN CALCIUM 10 MG: 10 TABLET, FILM COATED ORAL at 13:58

## 2021-08-22 RX ADMIN — OLANZAPINE 2.5 MG: 2.5 TABLET ORAL at 20:59

## 2021-08-22 RX ADMIN — SERTRALINE HYDROCHLORIDE 50 MG: 50 TABLET ORAL at 13:58

## 2021-08-22 ASSESSMENT — ACTIVITIES OF DAILY LIVING (ADL)
WALKING_OR_CLIMBING_STAIRS_DIFFICULTY: YES
DIFFICULTY_EATING/SWALLOWING: NO
FALL_HISTORY_WITHIN_LAST_SIX_MONTHS: YES
TOILETING_ASSISTANCE: TOILETING DIFFICULTY, ASSISTANCE 1 PERSON
WEAR_GLASSES_OR_BLIND: NO
DOING_ERRANDS_INDEPENDENTLY_DIFFICULTY: YES
DIFFICULTY_COMMUNICATING: NO
NUMBER_OF_TIMES_PATIENT_HAS_FALLEN_WITHIN_LAST_SIX_MONTHS: 1
CONCENTRATING,_REMEMBERING_OR_MAKING_DECISIONS_DIFFICULTY: YES
TOILETING_ISSUES: YES
DRESSING/BATHING_DIFFICULTY: YES
DRESSING/BATHING: BATHING DIFFICULTY, ASSISTANCE 1 PERSON
PREVIOUS_RESPONSIBILITIES: MEAL PREP;HOUSEKEEPING;LAUNDRY;SHOPPING;DRIVING
WALKING_OR_CLIMBING_STAIRS: AMBULATION DIFFICULTY, ASSISTANCE 1 PERSON
HEARING_DIFFICULTY_OR_DEAF: NO
WHICH_OF_THE_ABOVE_FUNCTIONAL_RISKS_HAD_A_RECENT_ONSET_OR_CHANGE?: AMBULATION;TRANSFERRING;COGNITION

## 2021-08-22 ASSESSMENT — MIFFLIN-ST. JEOR: SCORE: 1792.75

## 2021-08-22 NOTE — SIGNIFICANT EVENT
Significant Event Note    Time of event: 5:01 PM August 22, 2021    Description of event:    Patient espoused SI to bedside nurse stating that he has recently considered having an accident with a car and often wishes he would go to sleep and not wake up.     Plan:  Consult psych      Zaid Pressley MD

## 2021-08-22 NOTE — PROGRESS NOTES
RENAL PROGRESS NOTE    CC:  ESKD, fever, confusion    ASSESSMENT & PLAN:   ESRD - was on PD, exit and tunnel infx, PD cath removed, then replaced 7/29. Now on temporary IHD via CVC. Chemistries and volume looks controlled. No obvious fluid overload.   Gram stain on PD fluid neg and mildly elevated WBC  nontender abd and no evidence on exam of PD cath infx.   Recs:  - still complaints about drain pain  - will try adding tidal tonight  - if not able to tolerate, could ask IR for repositioning  - I'm reluctant to remove CVC empirically with ongoing complaints regarding PD cath     Fever - inc wbc, c/o pain (among other c/o) from CVC. Agree CVC is a likely source. Will ask IR to remove HD cath if cultures are positive.  I'm holding off today as I'm not convinced he is going to tolerate PD that well with current drain pain complaints.  PD cath no obvious infx or peritonitis    Lung infiltrates - he's had prominent recurrent c/o sob and he is not volume overloaded and had inc reticulonodular infiltrates. He's taken a lot of prednisone. ID considering OPAL but they're not convince it is contributing to his presentation     ACD on epo 20K weekly at PD clinic     Hyper PTH on renal diet and binders. renvela     HTN amlodipine and coreg     Depression, anxiety - on sertraline and other meds.  Very anxious, driving some of his outburst/anger I think.     Chris Westbrook  [facility]  [phone]            SUBJECTIVE:  Aware of patient from prior admits.  VERY irritable with me this morning.  Initially very angry and shouting about what he sees as mistakes made about his HD cath, PD cath, dialysis unit errors, doesn't feel like he's being listened too.  Eventually able to calm down and more apologetic.     He's worried about CVC infection.  He has pain there although only when running which is atypical.  Nothing growing on culture at this point.      Drain pain has been very significant.  Shortly before  admit, plan had been to try tidal exchanges.  He's pretty convinced it needs to be moved to cause less pain and I discussed this could possibly be true if tidaling doesn't help.      OBJECTIVE:  Physical Exam   Temp: (!) 100.8  F (38.2  C) Temp src: Oral BP: (!) 152/67 Pulse: 95   Resp: 18 SpO2: 94 % O2 Device: Nasal cannula Oxygen Delivery: 2 LPM  Vitals:    08/20/21 2215 08/21/21 1322 08/22/21 0630   Weight: 100 kg (220 lb 7.4 oz) 100 kg (220 lb 6.4 oz) 102.2 kg (225 lb 3.2 oz)     Vital Signs with Ranges  Temp:  [97.3  F (36.3  C)-100.8  F (38.2  C)] 100.8  F (38.2  C)  Pulse:  [75-95] 95  Resp:  [17-24] 18  BP: (105-156)/(57-70) 152/67  SpO2:  [92 %-99 %] 94 %  I/O last 3 completed shifts:  In: 1370 [P.O.:720; Other:650]  Out: 400 [Urine:400]    @TMAXR(24)@    Patient Vitals for the past 72 hrs:   Weight   08/22/21 0630 102.2 kg (225 lb 3.2 oz)   08/21/21 1322 100 kg (220 lb 6.4 oz)   08/20/21 2215 100 kg (220 lb 7.4 oz)       Intake/Output Summary (Last 24 hours) at 8/22/2021 0920  Last data filed at 8/22/2021 0630  Gross per 24 hour   Intake 1370 ml   Output 400 ml   Net 970 ml       PHYSICAL EXAM:  General - Alert and oriented x3, appears comfortable, NAD  Neck: RIJ CVC, mildly tender at exit site, no drainage  Cardiovascular - Regular rate and rhythm, no rub  Respiratory - Clear to auscultation bilaterally, no crackles or wheezes  Abd: BS present, PD cath nontender, no peritoneal signs  Extremities - No lower extremity edema bilaterally  Skin: dry, intact, no rash, good turgor  Neuro:  Grossly intact, no focal deficits  MSK:  Grossly intact  Psych: Angry but calming down to the end of our visit    LABORATORY STUDIES:     Recent Labs   Lab 08/22/21  0451 08/21/21  1124 08/20/21  2245   WBC 13.4* 19.5* 17.6*   RBC 3.22* 3.60* 3.86*   HGB 9.5* 10.9* 11.5*   HCT 31.0* 34.9* 36.8*    207 225       Basic Metabolic Panel:  Recent Labs   Lab 08/22/21  0758 08/22/21  0451 08/21/21  2256 08/21/21  0706  08/21/21  1052 08/20/21  2245 08/20/21 2243   NA  --  139  --   --   --  138 139   POTASSIUM  --  4.3  --   --   --  4.4 4.8   CHLORIDE  --  107  --   --   --  104 106   CO2  --  21*  --   --   --  23 21*   BUN  --  51*  --   --   --  39* 38*   CR  --  7.54*  --   --   --  5.32* 5.39*    110 164* 91 117 117 110   NISREEN  --  7.1*  --   --   --  8.6 8.5       INR  Recent Labs   Lab 08/21/21  1551 08/20/21  2245   INR 1.24* 1.11        Recent Labs   Lab Test 08/22/21  0451 08/21/21  1551 08/21/21  1124 08/20/21 2245   INR  --  1.24*  --  1.11   WBC 13.4*  --  19.5* 17.6*   HGB 9.5*  --  10.9* 11.5*     --  207 225       Personally reviewed current labs

## 2021-08-22 NOTE — PHARMACY-ADMISSION MEDICATION HISTORY
Pharmacy Note - Admission Medication History    Pertinent Provider Information: Both pharmacies the patient uses are closed today so most of the medications listed below did not have their doses confirmed by any resources. The patient is a moderate historian. He recognized some medications but not all and could not confirm doses of any medications.     ______________________________________________________________________    Prior To Admission (PTA) med list completed and updated in EMR.       PTA Med List   Medication Sig Note Last Dose     acetaminophen (TYLENOL) 325 MG tablet Take 650 mg by mouth every 6 hours as needed for fever or pain        albuterol (VENTOLIN HFA) 108 (90 Base) MCG/ACT inhaler Inhale 2 puffs into the lungs every 6 hours as needed for shortness of breath / dyspnea        aspirin (ASA) 81 MG tablet Take 81 mg by mouth daily  8/22/2021: Does not take regularly      atorvastatin (LIPITOR) 20 MG tablet Take 10 mg by mouth daily  8/22/2021: Unable to confirm dose.      budesonide-formoterol (SYMBICORT) 160-4.5 MCG/ACT Inhaler Inhale 1 puff into the lungs 2 times daily as needed       carvedilol (COREG) 12.5 MG tablet Take 25 mg by mouth 2 times daily (with meals)  8/22/2021: Unable to confirm dose Past Week at Unknown time     cetirizine (ZYRTEC) 10 MG tablet Take 10 mg by mouth daily  Past Week at Unknown time     furosemide (LASIX) 40 MG tablet Take 40 mg by mouth daily  8/22/2021: Unable to confirm dose      HYDROmorphone (DILAUDID) 2 MG tablet Take 2 mg by mouth every 6 hours as needed        insulin lispro (HUMALOG KWIKPEN) 100 UNIT/ML (1 unit dial) KWIKPEN Inject Subcutaneous 3 times daily (before meals)       ipratropium - albuterol 0.5 mg/2.5 mg/3 mL (DUONEB) 0.5-2.5 (3) MG/3ML neb solution 3 mLs every 6 hours as needed        omeprazole (PRILOSEC) 20 MG DR capsule Take 20 mg by mouth 2 times daily as needed       predniSONE (DELTASONE) 20 MG tablet Take 20 mg by mouth daily  8/21/2021:  Filled 8/19/21 Past Week at Unknown time     sertraline (ZOLOFT) 50 MG tablet Take 50 mg by mouth daily  8/22/2021: Unable to confirm dose Past Week at Unknown time     sevelamer carbonate (RENVELA) 800 MG tablet Take 1,600 mg by mouth 3 times daily (with meals)  Past Week at Unknown time     traZODone (DESYREL) 50 MG tablet Take 50 mg by mouth nightly as needed         Information source(s): Patient and CareEverywhere/SureScripts  Method of interview communication: phone    Summary of Changes to PTA Med List  New: Sertraline, trazodone, Humalog, Symbicort, Zyrtec  Discontinued: sodium bicarbonate, amlodipine, chlorthalidone, Breo, furosemide, gabapentin, lisinopril, paroxetine  Changed: Most    Patient was asked about OTC/herbal products specifically.  PTA med list reflects this.    In the past week, patient estimated taking medication this percent of the time:  50-90% due to other.    Allergies were reviewed, assessed, and updated with the patient.      Patient did not bring any medications to the hospital and can't retrieve from home. No multi-dose medications are available for use during hospital stay.     The information provided in this note is only as accurate as the sources available at the time of the update(s).    Thank you for the opportunity to participate in the care of this patient.    Vangie Weston [facility]  8/22/2021 10:58 AM

## 2021-08-22 NOTE — PLAN OF CARE
"  Problem: Adult Inpatient Plan of Care  Goal: Plan of Care Review  8/22/2021 1546 by Kalina Guerra, RN  Outcome: Improving  Flowsheets (Taken 8/22/2021 1546)  Plan of Care Reviewed With: patient  Progress: improving  Problem: Adjustment to Illness (Sepsis/Septic Shock)  Goal: Optimal Coping  8/22/2021 1546 by Kalina Guerra, RN  Outcome: Improving  Problem: Nutrition Impaired (Sepsis/Septic Shock)  Goal: Optimal Nutrition Intake  8/22/2021 1546 by Kalina Guerra, RN  Outcome: Improving    Pt initially at the start of shift was very upset regarding POC, PD and thinking \"My MD's are not on the same page [place] vs [place],\" and felt that no-one has been listening to him.  Writer listened to pt's questions and concerns reported these to MD's and pt's became apologetic as the shift went on.  Will continue to monitor pt's coping needs. Spiritual care has been notified to see pt on Monday per pt request.   Pt has had excellent PO intake this shift. Pt states in [place] he only treats his BG levels if his BG is over 200. It may be beneficial to have DM educator see pt tomorrow.  Writer initiated education about sliding scale being used, he said he will accepting of this at dinner time and pen has now arrived from pharmacy.  Will report this to MD.   "

## 2021-08-22 NOTE — PROGRESS NOTES
08/22/21 0850   Quick Adds   Type of Visit Initial Occupational Therapy Evaluation   Living Environment   People in home significant other   Current Living Arrangements house   Home Accessibility stairs to enter home   Number of Stairs, Main Entrance 5   Stair Railings, Main Entrance   (w/ rail)   Self-Care   Current Activity Tolerance fair   Instrumental Activities of Daily Living (IADL)   Previous Responsibilities meal prep;housekeeping;laundry;shopping;driving   IADL Comments indep. ALDs   Disability/Function   Hearing Difficulty or Deaf no   Difficulty Communicating no   Dressing/Bathing Difficulty no   Toileting issues no   Doing Errands Independently Difficulty (such as shopping) yes   Fall history within last six months yes   General Information   Onset of Illness/Injury or Date of Surgery 08/20/21   Patient/Family Therapy Goal Statement (OT) none stated   Existing Precautions/Restrictions fall  (drain, )   Cognitive Status Examination   Orientation Status time;person;place   Cognitive Status Comments rec. further screening   Visual Perception   Visual Impairment/Limitations corrective lenses for reading   Sensory   Sensory Quick Adds   (B feet numbness)   Range of Motion Comprehensive   General Range of Motion no range of motion deficits identified   Strength Comprehensive (MMT)   General Manual Muscle Testing (MMT) Assessment no strength deficits identified   Coordination   Upper Extremity Coordination No deficits were identified   Bed Mobility   Bed Mobility supine-sit   Supine-Sit [place] (Bed Mobility) independent   Transfers   Transfers toilet transfer   Transfer Comments bed>toilet>chair indep., no AD   Toilet Transfer   Type (Toilet Transfer) stand-sit   [place] Level (Toilet Transfer) independent   Assistive Device (Toilet Transfer)   (none)   Balance   Balance Assessment standing balance: static   Standing Balance: Static WFL   Upper Body Dressing Assessment   [place] Level (Upper  Body Dressing) minimum assist (75% patient effort)   Position (Upper Body Dressing) unsupported sitting   Comment (Upper Body Dressing) gown   Grooming Assessment   [place] Level (Grooming) supervision;set up   Position (Grooming) unsupported sitting   Comment (Grooming) seated in BR-g/h (teeth, face, upper body wash)SBA, all toileting w/ SBA after set up,SOB w/ all activity    Clinical Impression   Criteria for Skilled Therapeutic Interventions Met (OT) yes   OT Diagnosis decreased ADLs   OT Problem List-Impairments impacting ADL activity tolerance impaired   Assessment of Occupational Performance 1-3 Performance Deficits   Identified Performance Deficits drsg, toileting, endurance for ADLs   Planned Therapy Interventions (OT) cognition;ADL retraining;progressive activity/exercise   Clinical Decision Making Complexity (OT) moderate complexity   Therapy Frequency (OT) Daily   Predicted Duration of Therapy 7 days   Anticipated Equipment Needs Upon Discharge (OT)   (cont.to assess)   Risk & Benefits of therapy have been explained patient   OT Discharge Planning    OT Discharge Recommendation (DC Rec) Home with assist   OT Rationale for DC Rec assist with household tasks d/t decreased endurance   Total Evaluation Time (Minutes)   Total Evaluation Time (Minutes) 10

## 2021-08-22 NOTE — PROGRESS NOTES
"[facility] [place]GRESS [place]      ADMIT DATE: 8/20/2021     FACILITY: [facility]    PCP: Mc Gomez, [phone]    ASSESSMENT AND PLAN:   Patient is a 70 year old male with  has a past medical history of Anxiety and depression, Asthma, severe persistent, poorly-controlled (1958), ESRD (end stage renal disease) on dialysis (H), HLD (hyperlipidemia), Hypertension, IVY (obstructive sleep apnea), and Type 2 diabetes mellitus (H) (09/01/1980). comes in for AMS and fever.    Principal Problem:    Sepsis without acute organ dysfunction, due to unspecified organism (H)  Active Problems:    Sinus tachycardia    End-stage renal disease on hemodialysis (H)    Fever, unspecified fever cause    Leukocytosis, unspecified type      OPAL?  -CT chest shows: \"Compared to 11/21/2019, progression in chronic bilateral reticulonodular interstitial infiltrates likely on the basis of atypical small airway infection such as OPAL.\"  -ID consult---> \"suggesting chronic infection such as OPAL. Not related to current problems. He lacks productive cough and otherwise pulmonary symptoms are related to fluid status. Doubt this is a significant findings at this point. Can test sputum if he is able but would not pursue bronchoscopy at this point. Can monitor on serial CT over months to years. TB not suspected, no need for respiratory isolation. \"     Chest pain  -complains of chest pain at dialysis site  -EKG shows sinus tach with HR of 113 bpm. EKG had much artifact. Troponin x 3 negative .  -tylenol PRN        Peritonitis/septic  -patient was septic on admission with fever of 104.7 F and leukocytosis of 17.6  -Patient has had an infection to peritoneal dialysis port recently and has been on antibiotics. PD cath removed, then replaced 7/29. Patient on temporary IHD via CVC.  -8/20-8/21, blood cultures pending, gram stain of peritoneal fluid shows no growth, urine culture does not show growth. Sputum AFB pending.   -patient had " repeat fevers 8/22 morning, repeat blood culture obtained (2 at dialysis site) and are pending  -nephro has high suspicion that CVC is the primary source of the infection but awaiting for cultures to come back showing growth to confirm suspicion. If cultures show growth, will consult IR to remove HD cath.   -c/w IV zosyn and IV vanco for now  -nephro following and appreciate recommendations   -ID following and appreciate recommendations         ESRD  -MWF dialysis  -nephro consulted-->Plan try to use PD cath with low volumes tonight and if tolerated., will increase to his usual PD rx as tolerated.   -c/w home NaHCO3 and renvela        Confusion  -on admission, patient is alert to person and place but not to time and cannot give writer a hx  -most likely from underlying infection   -EKG shows sinus tach with HR of 113 bpm. EKG had much artifact. Troponin x 3 negative .   -ABG was unremarkable   -CT brain came back unremarkable  -vitamin B12, ammonia, and TSH WNL,   -urine tox + for MJ only    -AMS has resolved. Patient is AAOx3 today 8/22 and able to answer all of my questions appropriately today.         Fall  -had fall in ED with his confusion. Did not hit head.  -CT head unremarkable  -fall precautions   -PT/OT-->recommend home with assist          GERD  -c/w home protonix     Hx of depression  -c/w home zyprexa, haldol, trazodone, and zoloft       Hx of asthma  -was recently being treated for asthma exacerbation with prednisone  -c/w home symbicort, home duoneb and home albuterol        Hx of chronic pain  -restart home diluadid since confusion has resolved     HTN/CAD  -last echo showed LVEF of 55-60%  -BP and HR stable  -c/w home lasix and coreg with hold parameters     HLD  -c/w home lipitor        Hx of DM  -from current med list, on sliding scale insulin only at home  -HgbA1c 5.7 8/21/2021  -c/w low resistance sliding scale insulin for now  -c/w accuchecks and adjust insulin regimen PRN     Anemia  -Hgb at  "baseline which is 9-10  -monitor daily        Lightheadedness  -orthostatics pending  -TSH WNL  -PT/OT-->recommend home with assist  -denies any lightheadedness today      Neck pain  -ordered one time dose of flexeril  -consider starting patient on scheduled flexeril if needed              FEN/GI: low carb, low fat, dialysis diet  DVT proph: heparin   Code status: Full Code      Updated SO Jeanine on current plan.       Discharge barriers:  -cultures pending, IV antibxs, nephrology and ID following, possible HD catheter removal?   -ADOD: 2-3 days       SUBJECTIVE:    Patient states he still has pain at dialysis site on chest but it is controlled right now. Patient also complains of sudden neck pain just a few minutes ago. It feels like a muscle spasm. Patient denies any other complaints at this time.     ROS:  12 Points review of systems reviewed and is negative except for what has already been mentioned above    OBJECTIVE:  Patient Vitals for the past 24 hrs:   BP Temp Temp src Pulse Resp SpO2 Height Weight   08/22/21 0630 -- -- -- -- -- -- -- 102.2 kg (225 lb 3.2 oz)   08/22/21 0328 133/63 98.7  F (37.1  C) Oral 82 18 95 % -- --   08/21/21 2327 127/58 98.2  F (36.8  C) Oral 77 20 96 % -- --   08/21/21 1941 129/60 97.3  F (36.3  C) Oral 80 20 97 % -- --   08/21/21 1700 (!) 145/65 -- -- -- -- -- -- --   08/21/21 1628 -- -- -- 75 24 -- -- --   08/21/21 1508 139/65 97.6  F (36.4  C) Oral 76 24 95 % -- --   08/21/21 1415 139/63 -- -- 80 24 93 % -- --   08/21/21 1322 105/58 97.7  F (36.5  C) Oral 84 24 94 % 1.778 m (5' 10\") 100 kg (220 lb 6.4 oz)   08/21/21 1230 111/59 -- -- 83 17 99 % -- --   08/21/21 1219 -- -- -- 88 22 94 % -- --   08/21/21 1200 (!) 156/67 -- -- 81 20 93 % -- --   08/21/21 1130 138/70 -- -- 91 19 93 % -- --   08/21/21 1100 (!) 154/70 -- -- 80 21 93 % -- --   08/21/21 1030 138/60 -- -- 88 19 92 % -- --   08/21/21 1000 123/57 -- -- 95 22 -- -- --   08/21/21 0900 129/69 -- -- 86 25 96 % -- --   08/21/21 " 0858 127/63 98.9  F (37.2  C) Oral 87 21 96 % -- --   08/21/21 0830 134/58 -- -- 96 21 95 % -- --   08/21/21 0800 128/60 -- -- 97 22 94 % -- --          Intake/Output Summary (Last 24 hours) at 8/22/2021 0730  Last data filed at 8/22/2021 0630  Gross per 24 hour   Intake 1370 ml   Output 400 ml   Net 970 ml     GENRL: Alert and oriented to person, place, and time. Satting at 94% on 2 L O2 via NC.  HEENT: NC/AT                 Neck- supple                 Sclera- anicteric                 Mucous membrane- moist and pink  CHEST: Clear to auscultation bilaterally. CVC on chest.   HEART: S1S2 regular. No murmurs, rubs or gallops  ABDMN: Soft. Non-tender. No guarding or rigidity. Bowel sounds- active  EXTRM:  No pedal edema.   NEURO: No involuntary movements  INTGM: please see nursing assessment for full skin assessment  PULSES: 2+ and symmetric all extremities       DIAGNOSTIC DATA:          Recent Results (from the past 24 hour(s))   Glucose by meter    Collection Time: 08/21/21 10:52 AM   Result Value Ref Range    GLUCOSE BY METER POCT 117 70 - 125 mg/dL   UA with Microscopic reflex to Culture    Collection Time: 08/21/21 10:54 AM    Specimen: Urine, NOS   Result Value Ref Range    Color Urine Light Yellow Colorless, Straw, Light Yellow, Yellow    Appearance Urine Clear Clear    Glucose Urine 30  (A) Negative mg/dL    Bilirubin Urine Negative Negative    Ketones Urine Negative Negative mg/dL    Specific Gravity Urine 1.026 1.001 - 1.030    Blood Urine 0.03 mg/dL (A) Negative    pH Urine 7.0 5.0 - 7.0    Protein Albumin Urine 300  (A) Negative mg/dL    Urobilinogen Urine <2.0 <2.0 mg/dL    Nitrite Urine Negative Negative    Leukocyte Esterase Urine 25 Eric/uL (A) Negative    RBC Urine <1 <=2 /HPF    WBC Urine 7 (H) <=5 /HPF    Squamous Epithelials Urine 1 <=1 /HPF   Drugs of Abuse 1 Panel, Urine ([facility] Only)    Collection Time: 08/21/21 10:54 AM   Result Value Ref Range    Amphetamines Urine Screen Negative Screen  Negative    Benzodiazepines Urine Screen Negative Screen Negative    Opiates Urine Screen Negative Screen Negative    PCP Urine Screen Negative Screen Negative    Cannabinoids Urine Screen Positive (A) Screen Negative    Barbiturates Urine Screen Negative Screen Negative    Cocaine Urine Screen Negative Screen Negative    Oxycodone Urine Screen Negative Screen Negative    Creatinine Urine mg/dL 125 mg/dL   Ammonia    Collection Time: 08/21/21 11:24 AM   Result Value Ref Range    Ammonia 20 11 - 35 umol/L   Hemoglobin A1c    Collection Time: 08/21/21 11:24 AM   Result Value Ref Range    Hemoglobin A1C 5.7 (H) <=5.6 %   CBC with platelets    Collection Time: 08/21/21 11:24 AM   Result Value Ref Range    WBC Count 19.5 (H) 4.0 - 11.0 10e3/uL    RBC Count 3.60 (L) 4.40 - 5.90 10e6/uL    Hemoglobin 10.9 (L) 13.3 - 17.7 g/dL    Hematocrit 34.9 (L) 40.0 - 53.0 %    MCV 97 78 - 100 fL    MCH 30.3 26.5 - 33.0 pg    MCHC 31.2 (L) 31.5 - 36.5 g/dL    RDW 15.1 (H) 10.0 - 15.0 %    Platelet Count 207 150 - 450 10e3/uL   Troponin I    Collection Time: 08/21/21 11:24 AM   Result Value Ref Range    Troponin I 0.04 0.00 - 0.29 ng/mL   Lactate Dehydrogenase    Collection Time: 08/21/21  3:51 PM   Result Value Ref Range    Lactate Dehydrogenase 262 (H) 125 - 220 U/L   Troponin I    Collection Time: 08/21/21  3:51 PM   Result Value Ref Range    Troponin I 0.04 0.00 - 0.29 ng/mL   INR    Collection Time: 08/21/21  3:51 PM   Result Value Ref Range    INR 1.24 (H) 0.90 - 1.15   Partial thromboplastin time    Collection Time: 08/21/21  3:51 PM   Result Value Ref Range    aPTT 37 22 - 38 Seconds   Fibrinogen activity    Collection Time: 08/21/21  3:51 PM   Result Value Ref Range    Fibrinogen Activity 712 (H) 170 - 490 mg/dL   Extra Purple Top Tube    Collection Time: 08/21/21  3:52 PM   Result Value Ref Range    Hold Specimen JIC    Glucose by meter    Collection Time: 08/21/21  4:56 PM   Result Value Ref Range    GLUCOSE BY METER POCT 91  70 - 125 mg/dL   Troponin I    Collection Time: 08/21/21  9:11 PM   Result Value Ref Range    Troponin I 0.04 0.00 - 0.29 ng/mL   Glucose by meter    Collection Time: 08/21/21 10:56 PM   Result Value Ref Range    GLUCOSE BY METER POCT 164 (H) 70 - 125 mg/dL   Cell Count Body Fluid    Collection Time: 08/21/21 10:58 PM   Result Value Ref Range    Color Colorless Colorless    Clarity Hazy (A) Clear    Total Nucleated Cells      RBC Count     Differential Body Fluid    Collection Time: 08/21/21 10:58 PM   Result Value Ref Range    % Neutrophils 41 %    % Lymphocytes 39 %    % Monocyte/Macrophages 9 %    % Eosinophils 10 %    % Lining Cells 1 %   Fibrinogen activity    Collection Time: 08/22/21  4:51 AM   Result Value Ref Range    Fibrinogen Activity 664 (H) 170 - 490 mg/dL   CRP inflammation    Collection Time: 08/22/21  4:51 AM   Result Value Ref Range    CRP 23.2 (H) 0.0-<0.8 mg/dL   Comprehensive metabolic panel    Collection Time: 08/22/21  4:51 AM   Result Value Ref Range    Sodium 139 136 - 145 mmol/L    Potassium 4.3 3.5 - 5.0 mmol/L    Chloride 107 98 - 107 mmol/L    Carbon Dioxide (CO2) 21 (L) 22 - 31 mmol/L    Anion Gap 11 5 - 18 mmol/L    Urea Nitrogen 51 (H) 8 - 22 mg/dL    Creatinine 7.54 (HH) 0.70 - 1.30 mg/dL    Calcium 7.1 (L) 8.5 - 10.5 mg/dL    Glucose 110 70 - 125 mg/dL    Alkaline Phosphatase 58 45 - 120 U/L    AST 12 0 - 40 U/L    ALT <9 0 - 45 U/L    Protein Total 6.0 6.0 - 8.0 g/dL    Albumin 2.5 (L) 3.5 - 5.0 g/dL    Bilirubin Total 0.6 0.0 - 1.0 mg/dL    GFR Estimate 7 (L) >60 mL/min/1.73m2   CBC with platelets and differential    Collection Time: 08/22/21  4:51 AM   Result Value Ref Range    WBC Count 13.4 (H) 4.0 - 11.0 10e3/uL    RBC Count 3.22 (L) 4.40 - 5.90 10e6/uL    Hemoglobin 9.5 (L) 13.3 - 17.7 g/dL    Hematocrit 31.0 (L) 40.0 - 53.0 %    MCV 96 78 - 100 fL    MCH 29.5 26.5 - 33.0 pg    MCHC 30.6 (L) 31.5 - 36.5 g/dL    RDW 14.9 10.0 - 15.0 %    Platelet Count 199 150 - 450 10e3/uL     % Neutrophils 79 %    % Lymphocytes 12 %    % Monocytes 6 %    % Eosinophils 2 %    % Basophils 0 %    % Immature Granulocytes 1 %    NRBCs per 100 WBC 0 <1 /100    Absolute Neutrophils 10.8 (H) 1.6 - 8.3 10e3/uL    Absolute Lymphocytes 1.6 0.8 - 5.3 10e3/uL    Absolute Monocytes 0.8 0.0 - 1.3 10e3/uL    Absolute Eosinophils 0.3 0.0 - 0.7 10e3/uL    Absolute Basophils 0.0 0.0 - 0.2 10e3/uL    Absolute Immature Granulocytes 0.1 (H) <=0.0 10e3/uL    Absolute NRBCs 0.0 10e3/uL        Results for orders placed or performed during the hospital encounter of 08/20/21   XR Chest Port 1 View    Impression    IMPRESSION: Tunneled right internal jugular dialysis catheter terminates in the SVC. Poststernotomy. Normal heart size and pulmonary vascularity. Mild atelectasis or scarring of the lung bases. Lungs otherwise clear. No pleural fluid or pneumothorax.   CT Chest Pulmonary Embolism w Contrast    Impression    IMPRESSION:  1.  No pulmonary embolus.  2.  Compared to 11/21/2019, progression in chronic bilateral reticulonodular interstitial infiltrates likely on the basis of atypical small airway infection such as OPAL.  3.  No acute process in the abdomen or pelvis.   Abd/pelvis CT,  IV  contrast only TRAUMA / AAA    Impression    IMPRESSION:  1.  No pulmonary embolus.  2.  Compared to 11/21/2019, progression in chronic bilateral reticulonodular interstitial infiltrates likely on the basis of atypical small airway infection such as OPAL.  3.  No acute process in the abdomen or pelvis.   CT Head w/o Contrast    Impression    IMPRESSION:  1.  No CT evidence for acute intracranial process.    2.  Brain atrophy and presumed chronic microvascular ischemic changes as above.    3.  Study performed after administration of intravenous contrast material. While this slightly limits evaluation for intracranial hemorrhage, no such intracranial hemorrhage is identified.            All lab studies reviewed personally     Radiology Results:  imaging impressions reviewed     The total time spent in preparing this progress note is about 40 minutes, >50% time spent in care co-ordination that includes reviewing labs, images, discussing the plan of care with patient/family, consultants, and .      Zaid Pressley MD.   [facility] Medicine Service   [phone]   Pager [phone]

## 2021-08-22 NOTE — PROGRESS NOTES
Infectious Diseases Progress Note  [facility]    Date of visit: 8/22/2021      ASSESSMENT   1. Suspected sepsis -- high fever, tachycardia, leukocytosis. Based on pain at HD catheter site, suspect infected catheter. Wbc improving  2. Dialysis access difficulty -- reports one port at HD cath not working, has pain at site. PD catheter causes pain if used, not looking inflamed.   3. S/p PD catheter removal 7/13, new PD catheter placed 7/29. 7/13 culture with enterococcus. Didn't have antibiotic that would treat this, but previous site not inflamed and he does not have peritoneal signs. PD fluid sent for culture. Looks like low cell counts from fluid (28 wbc?)  4. Ceftriaxone allergy -- rash; sulfa and Augmentin intolerance.   5. ESRD, DM  6. Lung abnormalities seen on CT -- suggesting chronic infection such as OPAL. Not related to current problems. He lacks productive cough and otherwise pulmonary symptoms are related to fluid status. Doubt this is a significant findings at this point. Can monitor on serial CT over months to years. TB not suspected, no need for respiratory isolation. Sputum collected for AFB testing    PLAN   Continue vancomycin and pip/tazo pending cultures of blood and PD fluid.   If bacteremic will need HD catheter removed  F/up on sputum AFB     Shorty Luna MD  [facility]  Direct messaging: [employer] Paging  On-Call ID provider: [phone], option: 9  ______________________________________________________________________      SUBJECTIVE / INTERVAL HISTORY:     First visit by me. Pain with PD yesterday, but was able to sleep with pain meds. Very tired today. Walked around earlier and felt he had asthma attack. Denies shortness of breath now.      Review of Systems     No fevers, no rashes      Anti-infectives: pip/tazo 8/20-  Vancomycin 8/21-  Cultures: 8/20 blood pending  8/20 PD fluid culture pending -- gram stain with No PMNs, No organisms.   8/21 urine  culture: pending  8/21 sputum afb pending  8/22 blood culture: pending    Imaging: reviewed images of CT          Physical Exam:  Temp:  [97.3  F (36.3  C)-100.8  F (38.2  C)] 98.1  F (36.7  C)  Pulse:  [75-95] 84  Resp:  [18-24] 20  BP: (127-152)/(58-89) 128/89  SpO2:  [93 %-97 %] 94 %     GENERAL:  Sitting in chair, no distress  EYES: No conjunctival injection  HEAD, EARS, NOSE, MOUTH, AND THROAT: Nontraumatic  RESPIRATORY: Clear breath sounds to auscultation bilaterally.   CARDIOVASCULAR: Regular rate and rhythm, normal S1 and S2, no murmurs, rubs, or gallops.  ABDOMEN: Soft, tender at PD catheter site -- site not inflamed.   MUSCULOSKELETAL: No synovitis.  LYMPHATIC: No lymphadenopathy.  SKIN/HAIR/NAILS: No rashes, no signs of peripheral emboli.  NEUROLOGIC: Grossly intact.  HD catheter tender at entry site not along tunneled catheter.       Pertinent Labs:     Recent Labs   Lab 08/22/21  0451 08/21/21  1124 08/20/21  2245   WBC 13.4* 19.5* 17.6*   HGB 9.5* 10.9* 11.5*    207 225       Recent Labs   Lab 08/22/21  0451 08/20/21  2245 08/20/21  2243    138 139   CO2 21* 23 21*   BUN 51* 39* 38*   ALBUMIN 2.5* 3.2* 3.3*   ALKPHOS 58 70 69   ALT <9 <9 <9   AST 12 14 16       Recent Labs   Lab 08/22/21  0451   CRP 23.2*           Imaging:     XR Chest Port 1 View    Result Date: 8/20/2021  EXAM: XR CHEST PORT 1 VIEW LOCATION: [facility] DATE/TIME: 8/20/2021 10:41 PM INDICATION: Fever COMPARISON: 08/18/2021.     IMPRESSION: Tunneled right internal jugular dialysis catheter terminates in the SVC. Poststernotomy. Normal heart size and pulmonary vascularity. Mild atelectasis or scarring of the lung bases. Lungs otherwise clear. No pleural fluid or pneumothorax.    Abd/pelvis CT,  IV  contrast only TRAUMA / AAA    Result Date: 8/21/2021  EXAM: CT ABDOMEN PELVIS W CONTRAST, CT CHEST PULMONARY EMBOLISM W CONTRAST LOCATION: [facility] DATE/TIME: 8/21/2021 2:20 AM  INDICATION: Sepsis, fever, abdominal pain, tachycardia, altered mental status. COMPARISON: Chest radiograph 08/20/2021. CT abdomen and pelvis 10/09/2020. Noncontrast CT chest 11/21/2019. TECHNIQUE: CT chest pulmonary angiogram and routine CT abdomen pelvis with IV contrast. Arterial phase through the chest and venous phase through the abdomen and pelvis. Multiplanar reformats and MIP reconstructions were performed. Dose reduction techniques were used. CONTRAST: isovue 370 100ml FINDINGS: ANGIOGRAM CHEST: No aortic aneurysm or dissection. No pulmonary embolus. LUNGS AND PLEURA: Compared to 11/21/2019, there has been progression in reticulonodular interstitial infiltrate which affects all pulmonary lobes. The findings are most pronounced in the upper lobes, right more than left. Chronic subpleural scarring in the right upper lobe. No pleural fluid or pneumothorax. MEDIASTINUM/AXILLAE: Poststernotomy. A few top normal sized mediastinal lymph nodes. CORONARY ARTERY CALCIFICATION: Severe. HEPATOBILIARY: Small right hepatic cyst. Normal gallbladder and bile ducts. PANCREAS: Normal. SPLEEN: Normal. ADRENAL GLANDS: Normal. KIDNEYS/BLADDER: Tiny benign left renal cysts. Normal right kidney and bladder. BOWEL: Normal. LYMPH NODES: Normal. VASCULATURE: Mild aortoiliac atherosclerosis. PELVIC ORGANS: Normal. MUSCULOSKELETAL: Peritoneal dialysis catheter enters the ventral pelvic wall and terminates in the intraperitoneal space of the central pelvis superior to the urinary bladder. Stable 3.2 cm calcified nodule in the soft tissues medial to the left glenohumeral joint has a benign appearance (image 26 of series 2). 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 Degenerative changes of the spine.     IMPRESSION: 1.  No pulmonary embolus. 2.  Compared to 11/21/2019, progression in chronic bilateral reticulonodular interstitial infiltrates likely on the basis of atypical small airway  infection such as OPAL. 3.  No acute process in the abdomen or pelvis.    CT Chest Pulmonary Embolism w Contrast    Result Date: 8/21/2021  EXAM: CT ABDOMEN PELVIS W CONTRAST, CT CHEST PULMONARY EMBOLISM W CONTRAST LOCATION: [facility] DATE/TIME: 8/21/2021 2:20 AM INDICATION: Sepsis, fever, abdominal pain, tachycardia, altered mental status. COMPARISON: Chest radiograph 08/20/2021. CT abdomen and pelvis 10/09/2020. Noncontrast CT chest 11/21/2019. TECHNIQUE: CT chest pulmonary angiogram and routine CT abdomen pelvis with IV contrast. Arterial phase through the chest and venous phase through the abdomen and pelvis. Multiplanar reformats and MIP reconstructions were performed. Dose reduction techniques were used. CONTRAST: isovue 370 100ml FINDINGS: ANGIOGRAM CHEST: No aortic aneurysm or dissection. No pulmonary embolus. LUNGS AND PLEURA: Compared to 11/21/2019, there has been progression in reticulonodular interstitial infiltrate which affects all pulmonary lobes. The findings are most pronounced in the upper lobes, right more than left. Chronic subpleural scarring in the right upper lobe. No pleural fluid or pneumothorax. MEDIASTINUM/AXILLAE: Poststernotomy. A few top normal sized mediastinal lymph nodes. CORONARY ARTERY CALCIFICATION: Severe. HEPATOBILIARY: Small right hepatic cyst. Normal gallbladder and bile ducts. PANCREAS: Normal. SPLEEN: Normal. ADRENAL GLANDS: Normal. KIDNEYS/BLADDER: Tiny benign left renal cysts. Normal right kidney and bladder. BOWEL: Normal. LYMPH NODES: Normal. VASCULATURE: Mild aortoiliac atherosclerosis. PELVIC ORGANS: Normal. MUSCULOSKELETAL: Peritoneal dialysis catheter enters the ventral pelvic wall and terminates in the intraperitoneal space of the central pelvis superior to the urinary bladder. Stable 3.2 cm calcified nodule in the soft tissues medial to the left glenohumeral joint has a benign appearance (image 26 of series 2). Advanced osteoarthritis of  the left glenohumeral joint with bone-on-bone articulation. Degenerative changes of the spine.     IMPRESSION: 1.  No pulmonary embolus. 2.  Compared to 11/21/2019, progression in chronic bilateral reticulonodular interstitial infiltrates likely on the basis of atypical small airway infection such as OPAL. 3.  No acute process in the abdomen or pelvis.    CT Head w/o Contrast    Result Date: 8/21/2021  EXAM: CT HEAD WITHOUT CONTRAST LOCATION: [facility] DATE/TIME: 08/21/2021, 12:54 PM INDICATION: Mental status change, unknown cause. COMPARISON: Head CT 09/30/2018 and brain MR 03/02/2018. TECHNIQUE: Routine CT Head without IV contrast. Multiplanar reformats. Dose reduction techniques were used. FINDINGS: INTRACRANIAL CONTENTS: This study was performed post administration of intravenous contrast material. This slightly limits evaluation for intracranial hemorrhage, although no such intracranial hemorrhage is identified. No abnormal enhancement. No intracranial hemorrhage, extra-axial collection, or mass effect.  No CT evidence of acute infarct. Moderate presumed chronic small vessel ischemic changes. Mild generalized volume loss. No hydrocephalus. VISUALIZED ORBITS/SINUSES/MASTOIDS: Prior bilateral cataract surgery. Visualized portions of the orbits are otherwise unremarkable. No paranasal sinus mucosal disease. No middle ear or mastoid effusion. BONES/SOFT TISSUES: No acute abnormality.     IMPRESSION: 1.  No CT evidence for acute intracranial process. 2.  Brain atrophy and presumed chronic microvascular ischemic changes as above. 3.  Study performed after administration of intravenous contrast material. While this slightly limits evaluation for intracranial hemorrhage, no such intracranial hemorrhage is identified.         Data reviewed today: I reviewed all medications, new labs and imaging results over the last 24 hours. I personally reviewed no images or EKG's today.  The patient's care  was discussed with the Patient.

## 2021-08-22 NOTE — PLAN OF CARE
Problem: Asthma Comorbidity  Goal: Maintenance of Asthma Control  Outcome: No Change   Inspiratory and expiratory wheezes throughout, dyspnea at rest, improved after nebs.  Maintaining O2 saturation above 92% on 2L.  Continue to monitor.  Problem: Infection Progression (Sepsis)  Goal: Absence of Infection Signs and Symptoms  Outcome: Improving  Intervention: Promote Recovery  Recent Flowsheet Documentation  Taken 8/21/2021 1600 by Teresa Frederick RN  Activity Management:   activity adjusted per tolerance   activity encouraged  Remains afebrile, tolerating PD.

## 2021-08-22 NOTE — PROGRESS NOTES
"Dr. Pressley paged regarding pt statements. Currently safe. When writer walked into the room he was complaining of chest pain at HD site. Apparently, this has been a long problem. He has said he is sick of the pain and that his \"doctor's don't listen\" Says \" I wish a lot that I would go to sleep and not wake up\" . \" I have considered taking my truck and driving into a cement truck\". Asked him if he had discussed these feelings with anyone and he stated \" no, they dont listen anyway\" \" I have this pain in my chest that doesn't go away and no one will listen when I say something isn't right.\"  "

## 2021-08-22 NOTE — PROGRESS NOTES
08/22/21 1101   Quick Adds   Type of Visit Initial PT Evaluation   Living Environment   People in home significant other   Current Living Arrangements house   Home Accessibility stairs to enter home   Number of Stairs, Main Entrance 6   Stair Railings, Main Entrance railings safe and in good condition   Living Environment Comments patient reports he rests before climbing stairs at baseline    Self-Care   Usual Activity Tolerance moderate   Current Activity Tolerance fair   Equipment Currently Used at Home none   Disability/Function   Dressing/Bathing Difficulty no   Toileting issues no   Doing Errands Independently Difficulty (such as shopping) yes   Fall history within last six months yes   General Information   Onset of Illness/Injury or Date of Surgery 08/20/21   Patient/Family Therapy Goals Statement (PT) to go home, improve endurance   Pertinent History of Current Problem (include personal factors and/or comorbidities that impact the POC) sepsis, fall at home   Existing Precautions/Restrictions fall   Weight-Bearing Status - LLE full weight-bearing   Weight-Bearing Status - RLE full weight-bearing   Cognition   Orientation Status (Cognition) oriented x 4   Strength   Manual Muscle Testing Quick Adds Strength WFL   Bed Mobility   Comment (Bed Mobility) to/from chair    Transfers   Transfers No deficits identified   Gait/Stairs (Locomotion)   [place] Level (Gait) supervision;verbal cues   Assistive Device (Gait) walker, 4-wheeled   Distance in Feet (Required for LE Total Joints) 200ft    Pattern (Gait) step-through   Comment (Gait/Stairs) two short standing breaks secondary to SOB    Clinical Impression   Criteria for Skilled Therapeutic Intervention yes, treatment indicated   PT Diagnosis (PT) impaired functional mobility    Influenced by the following impairments decreased activity tolerance    Functional limitations due to impairments gait, stairs   Clinical Presentation Stable/Uncomplicated   Clinical  Presentation Rationale patient presents as medically diagnosed    Clinical Decision Making (Complexity) low complexity   Therapy Frequency (PT) Daily   Predicted Duration of Therapy Intervention (days/wks) 7 days   Planned Therapy Interventions (PT) bed mobility training;gait training;neuromuscular re-education;stair training;strengthening;transfer training   Anticipated Equipment Needs at Discharge (PT) walker, rolling;other (see comments)  (4WW)   Risk & Benefits of therapy have been explained evaluation/treatment results reviewed   PT Discharge Planning    PT Discharge Recommendation (DC Rec) home with assist   PT Rationale for DC Rec patient is able to ambulate household distances with supervision    PT Brief overview of current status  patient may benefit from acquiring 4WW for home use    Total Evaluation Time   Total Evaluation Time (Minutes) 14     Kat Adrian, PT

## 2021-08-22 NOTE — PLAN OF CARE
Body fluid sample from PD and sputum sample sent. PRN nebulizer given x1 with good effect. VSS throughout night, no fever present. PRN pain medication given x1 with good results. When not draining during PD no pain/discomfort noted. IV antibiotics given per schedule.     Problem: Adult Inpatient Plan of Care  Goal: Plan of Care Review  Outcome: No Change  Goal: Patient-Specific Goal (Individualized)  Outcome: No Change  Goal: Absence of Hospital-Acquired Illness or Injury  Outcome: No Change  Intervention: Identify and Manage Fall Risk  Recent Flowsheet Documentation  Taken 8/22/2021 0329 by Jesica Sotelo RN  Safety Promotion/Fall Prevention: bed alarm on  Intervention: Prevent Skin Injury  Recent Flowsheet Documentation  Taken 8/22/2021 0329 by Jesica Sotelo RN  Body Position:   right   turned  Goal: Optimal Comfort and Wellbeing  Outcome: No Change  Goal: Readiness for Transition of Care  Outcome: No Change  Intervention: Mutually Develop Transition Plan  Recent Flowsheet Documentation  Taken 8/22/2021 0100 by Jesica Sotelo RN  Equipment Currently Used at Home: none     Problem: Adjustment to Illness (Sepsis/Septic Shock)  Goal: Optimal Coping  Outcome: No Change     Problem: Bleeding (Sepsis/Septic Shock)  Goal: Absence of Bleeding  Outcome: No Change     Problem: Infection Progression (Sepsis)  Goal: Absence of Infection Signs and Symptoms  Outcome: No Change  Intervention: Promote Recovery  Recent Flowsheet Documentation  Taken 8/22/2021 0329 by Jesica Sotelo, RN  Activity Management: activity adjusted per tolerance    Problem: Nutrition Impaired (Sepsis/Septic Shock)  Goal: Optimal Nutrition Intake  Outcome: No Change     Problem: Asthma Comorbidity  Goal: Maintenance of Asthma Control  Outcome: No Change     Problem: Diabetes Comorbidity  Goal: Blood Glucose Level Within Targeted Range  Outcome: No Change

## 2021-08-22 NOTE — PHARMACY-VANCOMYCIN DOSING SERVICE
"Pharmacy Vancomycin Note  Date of Service 2021  Patient's  1950   70 year old, male    Indication: Sepsis  Day of Therapy: 3   Current vancomycin regimen:  Intermittent, patient on PD  Current vancomycin monitoring method: Trough (Method 2 = manual dose calculation)  Current vancomycin therapeutic monitoring goal: 15-20 mg/L    Current estimated CrCl = Estimated Creatinine Clearance: 11.1 mL/min (A) (based on SCr of 7.54 mg/dL (HH)).    Creatinine for last 3 days  2021: 10:43 PM Creatinine 5.39 mg/dL; 10:45 PM Creatinine 5.32 mg/dL  2021:  4:51 AM Creatinine 7.54 mg/dL    Recent Vancomycin Levels (past 3 days)  2021:  4:51 AM Vancomycin 13.1 mg/L    Vancomycin IV Administrations (past 72 hours)                   vancomycin 1500 mg in 0.9% NaCl 250 ml intermittent infusion 1,500 mg (mg) 1,500 mg New Bag 21 0106                Nephrotoxins and other renal medications (From now, onward)    Start     Dose/Rate Route Frequency Ordered Stop    21 1000  vancomycin 2000 mg in 0.9% NaCl 500 ml intermittent infusion 2,000 mg      2,000 mg  over 2 Hours Intravenous ONCE 21 0926      21 2200  piperacillin-tazobactam (ZOSYN) 3.375 g vial to attach to  mL bag     Note to Pharmacy: For [facility], [facility] and [facility] [place]: For Zosyn-naive patients, use the \"Zosyn initial dose + extended infusion\" order panel.    3.375 g  over 240 Minutes Intravenous EVERY 12 HOURS 21 1716      21 0958  vancomycin place ornelas - receiving intermittent dosing      1 each Intravenous SEE ADMIN INSTRUCTIONS 21 0959               Contrast Orders - past 72 hours (72h ago, onward)    Start     Dose/Rate Route Frequency Ordered Stop    21 0230  iopamidol (ISOVUE-370) solution 100 mL      100 mL Intravenous ONCE 21 0220 21 0240          Interpretation of levels and current regimen:  Vancomycin level is reflective of subtherapeutic level    Renal Function: ESRD on " Dialysis    Plan:  1. Vancomycin 2000 mg IV ONCE (19.6 mg/kg actual body weight). Continue intermittent dosing.  2. Vancomycin monitoring method: Trough (Method 2 = manual dose calculation)  3. Vancomycin therapeutic monitoring goal: 15-20 mg/L  4. Pharmacy will check vancomycin levels as appropriate in 1-3 Days.  5. Serum creatinine levels will be ordered a minimum of twice weekly.    Vangie Weston [facility]

## 2021-08-23 ENCOUNTER — APPOINTMENT (OUTPATIENT)
Dept: INTERVENTIONAL RADIOLOGY/VASCULAR | Facility: HOSPITAL | Age: 71
DRG: 907 | End: 2021-08-23
Attending: INTERNAL MEDICINE
Payer: MEDICARE

## 2021-08-23 LAB
ALBUMIN SERPL-MCNC: 2.4 G/DL (ref 3.5–5)
ALP SERPL-CCNC: 65 U/L (ref 45–120)
ALT SERPL W P-5'-P-CCNC: <9 U/L (ref 0–45)
ANION GAP SERPL CALCULATED.3IONS-SCNC: 14 MMOL/L (ref 5–18)
AST SERPL W P-5'-P-CCNC: 10 U/L (ref 0–40)
ATRIAL RATE - MUSE: 113 BPM
BASOPHILS # BLD AUTO: 0.1 10E3/UL (ref 0–0.2)
BASOPHILS NFR BLD AUTO: 1 %
BILIRUB SERPL-MCNC: 0.5 MG/DL (ref 0–1)
BUN SERPL-MCNC: 47 MG/DL (ref 8–22)
C REACTIVE PROTEIN LHE: 19.7 MG/DL (ref 0–0.8)
CALCIUM SERPL-MCNC: 7.2 MG/DL (ref 8.5–10.5)
CHLORIDE BLD-SCNC: 109 MMOL/L (ref 98–107)
CO2 SERPL-SCNC: 18 MMOL/L (ref 22–31)
CREAT SERPL-MCNC: 7.76 MG/DL (ref 0.7–1.3)
DIASTOLIC BLOOD PRESSURE - MUSE: NORMAL MMHG
EOSINOPHIL # BLD AUTO: 0.7 10E3/UL (ref 0–0.7)
EOSINOPHIL NFR BLD AUTO: 6 %
ERYTHROCYTE [DISTWIDTH] IN BLOOD BY AUTOMATED COUNT: 14.6 % (ref 10–15)
FIBRINOGEN PPP-MCNC: 851 MG/DL (ref 170–490)
GFR SERPL CREATININE-BSD FRML MDRD: 6 ML/MIN/1.73M2
GLUCOSE BLD-MCNC: 107 MG/DL (ref 70–125)
GLUCOSE BLDC GLUCOMTR-MCNC: 111 MG/DL (ref 70–125)
GLUCOSE BLDC GLUCOMTR-MCNC: 115 MG/DL (ref 70–125)
GLUCOSE BLDC GLUCOMTR-MCNC: 132 MG/DL (ref 70–125)
GLUCOSE BLDC GLUCOMTR-MCNC: 144 MG/DL (ref 70–125)
GLUCOSE BLDC GLUCOMTR-MCNC: 91 MG/DL (ref 70–125)
HCT VFR BLD AUTO: 31.2 % (ref 40–53)
HGB BLD-MCNC: 9.6 G/DL (ref 13.3–17.7)
HOLD SPECIMEN: NORMAL
IMM GRANULOCYTES # BLD: 0.1 10E3/UL
IMM GRANULOCYTES NFR BLD: 1 %
INTERPRETATION ECG - MUSE: NORMAL
LYMPHOCYTES # BLD AUTO: 1.7 10E3/UL (ref 0.8–5.3)
LYMPHOCYTES NFR BLD AUTO: 16 %
MCH RBC QN AUTO: 29.7 PG (ref 26.5–33)
MCHC RBC AUTO-ENTMCNC: 30.8 G/DL (ref 31.5–36.5)
MCV RBC AUTO: 97 FL (ref 78–100)
MONOCYTES # BLD AUTO: 0.7 10E3/UL (ref 0–1.3)
MONOCYTES NFR BLD AUTO: 6 %
NEUTROPHILS # BLD AUTO: 7.9 10E3/UL (ref 1.6–8.3)
NEUTROPHILS NFR BLD AUTO: 70 %
NRBC # BLD AUTO: 0 10E3/UL
NRBC BLD AUTO-RTO: 0 /100
P AXIS - MUSE: 68 DEGREES
PLATELET # BLD AUTO: 212 10E3/UL (ref 150–450)
POTASSIUM BLD-SCNC: 4.5 MMOL/L (ref 3.5–5)
PR INTERVAL - MUSE: 138 MS
PROT SERPL-MCNC: 6.1 G/DL (ref 6–8)
QRS DURATION - MUSE: 90 MS
QT - MUSE: 320 MS
QTC - MUSE: 438 MS
R AXIS - MUSE: 33 DEGREES
RBC # BLD AUTO: 3.23 10E6/UL (ref 4.4–5.9)
SODIUM SERPL-SCNC: 141 MMOL/L (ref 136–145)
SYSTOLIC BLOOD PRESSURE - MUSE: NORMAL MMHG
T AXIS - MUSE: 76 DEGREES
VENTRICULAR RATE- MUSE: 113 BPM
WBC # BLD AUTO: 11.1 10E3/UL (ref 4–11)

## 2021-08-23 PROCEDURE — 86141 C-REACTIVE PROTEIN HS: CPT | Performed by: FAMILY MEDICINE

## 2021-08-23 PROCEDURE — 210N000001 HC R&B IMCU HEART CARE

## 2021-08-23 PROCEDURE — 82785 ASSAY OF IGE: CPT | Performed by: INTERNAL MEDICINE

## 2021-08-23 PROCEDURE — 36589 REMOVAL TUNNELED CV CATH: CPT

## 2021-08-23 PROCEDURE — 99232 SBSQ HOSP IP/OBS MODERATE 35: CPT | Performed by: INTERNAL MEDICINE

## 2021-08-23 PROCEDURE — 250N000013 HC RX MED GY IP 250 OP 250 PS 637: Performed by: NURSE PRACTITIONER

## 2021-08-23 PROCEDURE — 999N000157 HC STATISTIC RCP TIME EA 10 MIN

## 2021-08-23 PROCEDURE — 36415 COLL VENOUS BLD VENIPUNCTURE: CPT | Performed by: FAMILY MEDICINE

## 2021-08-23 PROCEDURE — 82040 ASSAY OF SERUM ALBUMIN: CPT | Performed by: FAMILY MEDICINE

## 2021-08-23 PROCEDURE — 85384 FIBRINOGEN ACTIVITY: CPT | Performed by: FAMILY MEDICINE

## 2021-08-23 PROCEDURE — 250N000011 HC RX IP 250 OP 636: Performed by: FAMILY MEDICINE

## 2021-08-23 PROCEDURE — 99233 SBSQ HOSP IP/OBS HIGH 50: CPT | Performed by: INTERNAL MEDICINE

## 2021-08-23 PROCEDURE — 250N000009 HC RX 250: Performed by: FAMILY MEDICINE

## 2021-08-23 PROCEDURE — 99222 1ST HOSP IP/OBS MODERATE 55: CPT | Performed by: NURSE PRACTITIONER

## 2021-08-23 PROCEDURE — 85004 AUTOMATED DIFF WBC COUNT: CPT | Performed by: FAMILY MEDICINE

## 2021-08-23 PROCEDURE — 02PYX3Z REMOVAL OF INFUSION DEVICE FROM GREAT VESSEL, EXTERNAL APPROACH: ICD-10-PCS | Performed by: RADIOLOGY

## 2021-08-23 PROCEDURE — 250N000013 HC RX MED GY IP 250 OP 250 PS 637: Performed by: FAMILY MEDICINE

## 2021-08-23 PROCEDURE — 36415 COLL VENOUS BLD VENIPUNCTURE: CPT | Performed by: INTERNAL MEDICINE

## 2021-08-23 PROCEDURE — 87070 CULTURE OTHR SPECIMN AEROBIC: CPT | Performed by: INTERNAL MEDICINE

## 2021-08-23 PROCEDURE — 86003 ALLG SPEC IGE CRUDE XTRC EA: CPT | Performed by: INTERNAL MEDICINE

## 2021-08-23 PROCEDURE — 250N000013 HC RX MED GY IP 250 OP 250 PS 637

## 2021-08-23 PROCEDURE — 272N000004 HC RX 272: Performed by: INTERNAL MEDICINE

## 2021-08-23 PROCEDURE — 94640 AIRWAY INHALATION TREATMENT: CPT

## 2021-08-23 RX ORDER — HEPARIN SODIUM 1000 [USP'U]/ML
500 INJECTION, SOLUTION INTRAVENOUS; SUBCUTANEOUS CONTINUOUS
Status: DISCONTINUED | OUTPATIENT
Start: 2021-08-23 | End: 2021-08-28 | Stop reason: HOSPADM

## 2021-08-23 RX ORDER — GENTAMICIN SULFATE 1 MG/G
CREAM TOPICAL DAILY PRN
Status: DISCONTINUED | OUTPATIENT
Start: 2021-08-23 | End: 2021-08-23

## 2021-08-23 RX ORDER — HEPARIN SODIUM 1000 [USP'U]/ML
500 INJECTION, SOLUTION INTRAVENOUS; SUBCUTANEOUS
Status: DISCONTINUED | OUTPATIENT
Start: 2021-08-23 | End: 2021-08-25

## 2021-08-23 RX ORDER — OLANZAPINE 2.5 MG/1
2.5 TABLET, FILM COATED ORAL 3 TIMES DAILY
Status: DISCONTINUED | OUTPATIENT
Start: 2021-08-23 | End: 2021-08-25

## 2021-08-23 RX ORDER — LORAZEPAM 0.5 MG/1
0.25 TABLET ORAL EVERY 4 HOURS PRN
Status: DISCONTINUED | OUTPATIENT
Start: 2021-08-23 | End: 2021-08-28 | Stop reason: HOSPADM

## 2021-08-23 RX ADMIN — HEPARIN SODIUM 5000 UNITS: 10000 INJECTION, SOLUTION INTRAVENOUS; SUBCUTANEOUS at 20:34

## 2021-08-23 RX ADMIN — HEPARIN SODIUM 5000 UNITS: 10000 INJECTION, SOLUTION INTRAVENOUS; SUBCUTANEOUS at 05:00

## 2021-08-23 RX ADMIN — ALBUTEROL SULFATE 2 PUFF: 90 INHALANT RESPIRATORY (INHALATION) at 16:24

## 2021-08-23 RX ADMIN — IPRATROPIUM BROMIDE AND ALBUTEROL SULFATE 3 ML: .5; 3 SOLUTION RESPIRATORY (INHALATION) at 18:29

## 2021-08-23 RX ADMIN — SERTRALINE HYDROCHLORIDE 50 MG: 50 TABLET ORAL at 09:36

## 2021-08-23 RX ADMIN — HEPARIN SODIUM 5000 UNITS: 10000 INJECTION, SOLUTION INTRAVENOUS; SUBCUTANEOUS at 11:46

## 2021-08-23 RX ADMIN — CARVEDILOL 25 MG: 12.5 TABLET, FILM COATED ORAL at 16:24

## 2021-08-23 RX ADMIN — CARVEDILOL 25 MG: 12.5 TABLET, FILM COATED ORAL at 09:35

## 2021-08-23 RX ADMIN — SEVELAMER CARBONATE 1600 MG: 800 TABLET, FILM COATED ORAL at 17:24

## 2021-08-23 RX ADMIN — SODIUM CHLORIDE, SODIUM LACTATE, CALCIUM CHLORIDE, MAGNESIUM CHLORIDE AND DEXTROSE: 1.5; 538; 448; 18.3; 5.08 INJECTION, SOLUTION INTRAPERITONEAL at 15:11

## 2021-08-23 RX ADMIN — FUROSEMIDE 40 MG: 20 TABLET ORAL at 09:35

## 2021-08-23 RX ADMIN — ACETAMINOPHEN 650 MG: 325 TABLET ORAL at 18:46

## 2021-08-23 RX ADMIN — HYDROMORPHONE HYDROCHLORIDE 0.5 MG: 1 INJECTION, SOLUTION INTRAMUSCULAR; INTRAVENOUS; SUBCUTANEOUS at 05:25

## 2021-08-23 RX ADMIN — CETIRIZINE HYDROCHLORIDE 10 MG: 10 TABLET ORAL at 09:34

## 2021-08-23 RX ADMIN — PIPERACILLIN SODIUM AND TAZOBACTAM SODIUM 3.38 G: 3; .375 INJECTION, POWDER, LYOPHILIZED, FOR SOLUTION INTRAVENOUS at 09:34

## 2021-08-23 RX ADMIN — ALBUTEROL SULFATE 2 PUFF: 90 INHALANT RESPIRATORY (INHALATION) at 00:03

## 2021-08-23 RX ADMIN — TRAZODONE HYDROCHLORIDE 50 MG: 50 TABLET ORAL at 20:33

## 2021-08-23 RX ADMIN — HYDROMORPHONE HYDROCHLORIDE 0.5 MG: 1 INJECTION, SOLUTION INTRAMUSCULAR; INTRAVENOUS; SUBCUTANEOUS at 22:00

## 2021-08-23 RX ADMIN — SEVELAMER CARBONATE 1600 MG: 800 TABLET, FILM COATED ORAL at 12:28

## 2021-08-23 RX ADMIN — IPRATROPIUM BROMIDE AND ALBUTEROL SULFATE 3 ML: .5; 3 SOLUTION RESPIRATORY (INHALATION) at 05:14

## 2021-08-23 RX ADMIN — TRAZODONE HYDROCHLORIDE 25 MG: 50 TABLET ORAL at 20:33

## 2021-08-23 RX ADMIN — ACETAMINOPHEN 650 MG: 325 TABLET ORAL at 10:04

## 2021-08-23 RX ADMIN — ATORVASTATIN CALCIUM 10 MG: 10 TABLET, FILM COATED ORAL at 09:36

## 2021-08-23 RX ADMIN — OLANZAPINE 2.5 MG: 2.5 TABLET ORAL at 16:19

## 2021-08-23 RX ADMIN — SEVELAMER CARBONATE 1600 MG: 800 TABLET, FILM COATED ORAL at 09:35

## 2021-08-23 RX ADMIN — PIPERACILLIN SODIUM AND TAZOBACTAM SODIUM 3.38 G: 3; .375 INJECTION, POWDER, LYOPHILIZED, FOR SOLUTION INTRAVENOUS at 22:03

## 2021-08-23 RX ADMIN — OLANZAPINE 2.5 MG: 2.5 TABLET ORAL at 20:33

## 2021-08-23 RX ADMIN — HYDROMORPHONE HYDROCHLORIDE 2 MG: 2 TABLET ORAL at 23:47

## 2021-08-23 ASSESSMENT — MIFFLIN-ST. JEOR
SCORE: 1796.66
SCORE: 1796.65

## 2021-08-23 NOTE — SIGNIFICANT EVENT
At 1851 Esteban had a sudden episode of severe nausea and a drop in blood pressure accompanied by need to use the bathroom shortly after. Did have diarrhea in the bathroom. Nausea is constant but feels better if he is leaning over with elbows on knees. Remains alert and oriented at the time and denies dizziness. States it is a sudden wave of nausea and urgent need to use the bathroom. Also has pain in his chest across from R to L focused on HD cath that makes it feel like it is hard to breathe. Dr. Cornejo here to assess pt at bedside. Lactic Acid drawn, see vital signs documentation

## 2021-08-23 NOTE — PROVIDER NOTIFICATION
Verbal bedside report given to Nettie MAYO RN. Patient was able to transfer from cart to bed without difficulty.

## 2021-08-23 NOTE — PROGRESS NOTES
Infectious Diseases Progress Note  [facility]    Date of visit: 8/22/2021      ASSESSMENT   1. Suspected sepsis -- high fever, tachycardia, leukocytosis. Based on pain at HD catheter site, suspect infected catheter. Wbc improving  2. Dialysis access difficulty -- reports one port at HD cath not working, has pain at site. PD catheter causes pain if used, not looking inflamed.   3. S/p PD catheter removal 7/13, new PD catheter placed 7/29. 7/13 culture with enterococcus. Didn't have antibiotic that would treat this, but previous site not inflamed and he does not have peritoneal signs. PD fluid sent for culture. Looks like low cell counts from fluid (28 wbc?)  4. Ceftriaxone allergy -- rash; sulfa and Augmentin intolerance.   5. ESRD, DM  6. Lung abnormalities seen on CT -- suggesting chronic infection such as OPAL. Reports frequent asthma attacks, requiring steroids. No current symptoms. Sputum collected for AFB testing, pending, TB not suspected. Work-up for ABPA seems reasonable as well    PLAN   Continue vancomycin and pip/tazo pending cultures of blood and PD fluid.   HD catheter removal due to concern for infection. Okay to replace when needed for HD  F/up on sputum AFB   Cbc with diff, Total IgE, and aspergillus IgE levels    Shorty Luna MD  [facility]  Direct messaging: [employer]. Paging  On-Call ID provider: [phone], option: 9  ______________________________________________________________________      SUBJECTIVE / INTERVAL HISTORY:     No events. Plan for PD this afternoon. Had catheter removed. Tolerating antibiotics.    Review of Systems     No fevers, no rashes      Anti-infectives: pip/tazo 8/20-  Vancomycin 8/21-  Cultures: 8/20 blood pending  8/20 PD fluid culture pending -- gram stain with No PMNs, No organisms.   8/21 urine culture: pending  8/21 sputum afb pending  8/22 blood culture: pending    Imaging: reviewed images of CT          Physical Exam:  Temp:   [98.4  F (36.9  C)-100.6  F (38.1  C)] 99.8  F (37.7  C)  Pulse:  [64-83] 78  Resp:  [16-22] 20  BP: (108-178)/(58-82) 143/69  SpO2:  [90 %-96 %] 96 %     GENERAL:  Lying in bed, no distress  EYES: No conjunctival injection  HEAD, EARS, NOSE, MOUTH, AND THROAT: Nontraumatic  RESPIRATORY: Clear breath sounds to auscultation bilaterally.   CARDIOVASCULAR: Regular rate and rhythm, normal S1 and S2, no murmurs, rubs, or gallops.  ABDOMEN: Soft, tender at PD catheter site -- site not inflamed.   MUSCULOSKELETAL: No synovitis.  LYMPHATIC: No lymphadenopathy.  SKIN/HAIR/NAILS: No rashes, no signs of peripheral emboli.  NEUROLOGIC: sleeping, briefly arouses, but goes back to sleep      Pertinent Labs:     Recent Labs   Lab 08/23/21  0534 08/22/21  0451 08/21/21  1124   WBC 11.1* 13.4* 19.5*   HGB 9.6* 9.5* 10.9*    199 207       Recent Labs   Lab 08/23/21  0534 08/22/21  0451 08/20/21  2245    139 138   CO2 18* 21* 23   BUN 47* 51* 39*   ALBUMIN 2.4* 2.5* 3.2*   ALKPHOS 65 58 70   ALT <9 <9 <9   AST 10 12 14       Recent Labs   Lab 08/23/21  0534 08/22/21  0451   CRP 19.7* 23.2*           Imaging:     XR Chest Port 1 View    Result Date: 8/20/2021  EXAM: XR CHEST PORT 1 VIEW LOCATION: [facility] DATE/TIME: 8/20/2021 10:41 PM INDICATION: Fever COMPARISON: 08/18/2021.     IMPRESSION: Tunneled right internal jugular dialysis catheter terminates in the SVC. Poststernotomy. Normal heart size and pulmonary vascularity. Mild atelectasis or scarring of the lung bases. Lungs otherwise clear. No pleural fluid or pneumothorax.    Abd/pelvis CT,  IV  contrast only TRAUMA / AAA    Result Date: 8/21/2021  EXAM: CT ABDOMEN PELVIS W CONTRAST, CT CHEST PULMONARY EMBOLISM W CONTRAST LOCATION: [facility] DATE/TIME: 8/21/2021 2:20 AM INDICATION: Sepsis, fever, abdominal pain, tachycardia, altered mental status. COMPARISON: Chest radiograph 08/20/2021. CT abdomen and pelvis  10/09/2020. Noncontrast CT chest 11/21/2019. TECHNIQUE: CT chest pulmonary angiogram and routine CT abdomen pelvis with IV contrast. Arterial phase through the chest and venous phase through the abdomen and pelvis. Multiplanar reformats and MIP reconstructions were performed. Dose reduction techniques were used. CONTRAST: isovue 370 100ml FINDINGS: ANGIOGRAM CHEST: No aortic aneurysm or dissection. No pulmonary embolus. LUNGS AND PLEURA: Compared to 11/21/2019, there has been progression in reticulonodular interstitial infiltrate which affects all pulmonary lobes. The findings are most pronounced in the upper lobes, right more than left. Chronic subpleural scarring in the right upper lobe. No pleural fluid or pneumothorax. MEDIASTINUM/AXILLAE: Poststernotomy. A few top normal sized mediastinal lymph nodes. CORONARY ARTERY CALCIFICATION: Severe. HEPATOBILIARY: Small right hepatic cyst. Normal gallbladder and bile ducts. PANCREAS: Normal. SPLEEN: Normal. ADRENAL GLANDS: Normal. KIDNEYS/BLADDER: Tiny benign left renal cysts. Normal right kidney and bladder. BOWEL: Normal. LYMPH NODES: Normal. VASCULATURE: Mild aortoiliac atherosclerosis. PELVIC ORGANS: Normal. MUSCULOSKELETAL: Peritoneal dialysis catheter enters the ventral pelvic wall and terminates in the intraperitoneal space of the central pelvis superior to the urinary bladder. Stable 3.2 cm calcified nodule in the soft tissues medial to the left glenohumeral joint has a benign appearance (image 26 of series 2). 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 Degenerative changes of the spine.     IMPRESSION: 1.  No pulmonary embolus. 2.  Compared to 11/21/2019, progression in chronic bilateral reticulonodular interstitial infiltrates likely on the basis of atypical small airway infection such as OPAL. 3.  No acute process in the abdomen or pelvis.    CT Chest Pulmonary Embolism w Contrast    Result Date: 8/21/2021  EXAM: CT  ABDOMEN PELVIS W CONTRAST, CT CHEST PULMONARY EMBOLISM W CONTRAST LOCATION: [facility] DATE/TIME: 8/21/2021 2:20 AM INDICATION: Sepsis, fever, abdominal pain, tachycardia, altered mental status. COMPARISON: Chest radiograph 08/20/2021. CT abdomen and pelvis 10/09/2020. Noncontrast CT chest 11/21/2019. TECHNIQUE: CT chest pulmonary angiogram and routine CT abdomen pelvis with IV contrast. Arterial phase through the chest and venous phase through the abdomen and pelvis. Multiplanar reformats and MIP reconstructions were performed. Dose reduction techniques were used. CONTRAST: isovue 370 100ml FINDINGS: ANGIOGRAM CHEST: No aortic aneurysm or dissection. No pulmonary embolus. LUNGS AND PLEURA: Compared to 11/21/2019, there has been progression in reticulonodular interstitial infiltrate which affects all pulmonary lobes. The findings are most pronounced in the upper lobes, right more than left. Chronic subpleural scarring in the right upper lobe. No pleural fluid or pneumothorax. MEDIASTINUM/AXILLAE: Poststernotomy. A few top normal sized mediastinal lymph nodes. CORONARY ARTERY CALCIFICATION: Severe. HEPATOBILIARY: Small right hepatic cyst. Normal gallbladder and bile ducts. PANCREAS: Normal. SPLEEN: Normal. ADRENAL GLANDS: Normal. KIDNEYS/BLADDER: Tiny benign left renal cysts. Normal right kidney and bladder. BOWEL: Normal. LYMPH NODES: Normal. VASCULATURE: Mild aortoiliac atherosclerosis. PELVIC ORGANS: Normal. MUSCULOSKELETAL: Peritoneal dialysis catheter enters the ventral pelvic wall and terminates in the intraperitoneal space of the central pelvis superior to the urinary bladder. Stable 3.2 cm calcified nodule in the soft tissues medial to the left glenohumeral joint has a benign appearance (image 26 of series 2). 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 Degenerative changes of the spine.     IMPRESSION: 1.  No pulmonary embolus. 2.  Compared  to 11/21/2019, progression in chronic bilateral reticulonodular interstitial infiltrates likely on the basis of atypical small airway infection such as OPAL. 3.  No acute process in the abdomen or pelvis.    CT Head w/o Contrast    Result Date: 8/21/2021  EXAM: CT HEAD WITHOUT CONTRAST LOCATION: [facility] DATE/TIME: 08/21/2021, 12:54 PM INDICATION: Mental status change, unknown cause. COMPARISON: Head CT 09/30/2018 and brain MR 03/02/2018. TECHNIQUE: Routine CT Head without IV contrast. Multiplanar reformats. Dose reduction techniques were used. FINDINGS: INTRACRANIAL CONTENTS: This study was performed post administration of intravenous contrast material. This slightly limits evaluation for intracranial hemorrhage, although no such intracranial hemorrhage is identified. No abnormal enhancement. No intracranial hemorrhage, extra-axial collection, or mass effect.  No CT evidence of acute infarct. Moderate presumed chronic small vessel ischemic changes. Mild generalized volume loss. No hydrocephalus. VISUALIZED ORBITS/SINUSES/MASTOIDS: Prior bilateral cataract surgery. Visualized portions of the orbits are otherwise unremarkable. No paranasal sinus mucosal disease. No middle ear or mastoid effusion. BONES/SOFT TISSUES: No acute abnormality.     IMPRESSION: 1.  No CT evidence for acute intracranial process. 2.  Brain atrophy and presumed chronic microvascular ischemic changes as above. 3.  Study performed after administration of intravenous contrast material. While this slightly limits evaluation for intracranial hemorrhage, no such intracranial hemorrhage is identified.         Data reviewed today: I reviewed all medications, new labs and imaging results over the last 24 hours. I personally reviewed no images or EKG's today.  The patient's care was discussed with the neprhology Consultant.

## 2021-08-23 NOTE — CONSULTS
Interventional Radiology - Consultation Note:  Inpatient - [facility]  8/23/2021    Reason for Consult:  Tunneled HD catheter placement   Requesting Provider:  Dr. Westbrook    HPI:  Dylon Trevizo is a 70 year old male with history of DM, HTN, IVY, ESRD on HD, asthma who presents to the Emergency Department for evaluation of weakness, fever and cough.  Leukocytosis, Tmax 104.7.  Admitted and diagnosed with sepsis.  ID consulted.     Tunneled HD catheter in place subsequently removed per below 8/23/2021 2/2 sepsis- suspected infected catheter.    Consultation requested for replacement of tunneled HD catheter.     He does have a PD catheter in place.  Ordering team has discussed with LATONYA RIZO RE: repositioning in the outpatient setting.        IMAGING:    [facility]  LOCATION: [facility]  DATE: 8/23/2021     PROCEDURE: TUNNELED DIALYSIS CATHETER REMOVAL     ATTENDING: Chay Alexandra MD     INDICATION: Bacteremia.     CONSENT: The risks, benefits, and alternatives of tunneled catheter removal were discussed with the patient in detail. All questions were answered. Informed consent was given to proceed with the procedure.     MODERATE SEDATION:  None.     ADDITIONAL MEDICATIONS: 1% lidocaine for local anesthesia.     FLUOROSCOPIC TIME: None.     CONTRAST: None.     UNIVERSAL PRECAUTIONS: The procedure was performed utilizing maximum sterile barrier technique. Prior to the start of the procedure a standard pause for patient safety was performed with site marking as indicated.     COMPLICATIONS: No immediate complications.     PROCEDURE:    The catheter cuff of the previously placed tunneled catheter was dissected free under local anesthesia. The catheter was removed and inspected. It was found to be removed in its entirety.     FINDINGS:  The previously placed tunneled catheter was removed in its entirety.                                                                     "  IMPRESSION:    1. Successful tunneled catheter removal.     NPO Status:  Ordered at MN 8/24/2021  Anticoagulation/Antiplatelets/Bleeding tendencies:  Heparin 5000 units subcutaneous Q8  Antibiotics:  Zosyn 3.375g IV Q12, off Vancomycin    REVIEW OF SYSTEMS:  A comprehensive 10-point review of systems was performed. All systems were reviewed and negative with exception to those reported in the HPI.     PAST MEDICAL HISTORY:  Past Medical History:   Diagnosis Date     Anxiety and depression      Asthma, severe persistent, poorly-controlled 1958     ESRD (end stage renal disease) on dialysis (H)      HLD (hyperlipidemia)      Hypertension      IVY (obstructive sleep apnea)      Type 2 diabetes mellitus (H) 09/01/1980       PAST SURGICAL HISTORY:  Past Surgical History:   Procedure Laterality Date     APPENDECTOMY  1970's     APPENDECTOMY       [facility]OS[facility]  2018    [facility]     HEAD & NECK SURGERY  as child    \"tumor removed x 2\"     IR CVC TUNNEL REMOVAL RIGHT  8/23/2021     HI LAP INSERTION TUNNELED INTRAPERITONEAL CATHETER N/A 10/11/2019    Procedure: INSERTION, CATHETER, PERITONEAL, LAPAROSCOPIC, LYSIS OF ADHESIONS;  Surgeon: Barrie Britt MD;  Location: [facility];  Service: General     THORACIC SURGERY         ALLERGIES:  Allergies   Allergen Reactions     Ace Inhibitors Unknown and Other (See Comments)     Hyperkalemia  Hyperkalemia  hyperkalemia       Losartan Unknown and Other (See Comments)     hyperkalemia  Hyperkalemia  hyperkalemia       Tramadol Other (See Comments) and Shortness Of Breath     SOB  Dyspnea       Aminophylline Nausea and Vomiting and Unknown     Nausea/vomiting       Folic Acid-Vit B6-Vit B12 Unknown     Unknown  Other reaction(s): Unknown  Unknown       Sulfamethoxazole-Trimethoprim Nausea and Vomiting and Nausea     Nausea/vomiting       Theophylline Nausea and Vomiting     Nausea/vomiting       Vitamin B12 Unknown     unknown  Other reaction(s): *Unknown       Ceftriaxone Rash "     Came in with strep, rash after eeceiving rocephin, localized to his back only-c/o of itching  Came in with strep, rash after eeceiving rocephin, localized to his back only-c/o of itching  Rash/itching  Came in with strep, rash after eeceiving rocephin, localized to his back only-c/o of itching  Rash/itching  Came in with strep, rash after eeceiving rocephin, localized to his back only-c/o of itching  Tolerated cefazolin 10/2018  Came in with strep, rash after eeceiving rocephin, localized to his back only-c/o of itching       Clavulanic Acid Nausea and Rash     aka  AUGMENTIN  (NAUSEA)  aka  AUGMENTIN  (NAUSEA)  Unknown  aka  AUGMENTIN  (NAUSEA)  Unknown  aka  AUGMENTIN  (NAUSEA)  Other reaction(s): Nausea Only  aka  AUGMENTIN  (NAUSEA)  aka  AUGMENTIN  (NAUSEA)       Hydralazine Muscle Pain (Myalgia) and Rash     Muscle aches, severe cramps, constant, felt like RA in hands  Muscle aches; severe cramps, felt like RA in hands, constant   myalgia          MEDICATIONS:  Current Facility-Administered Medications   Medication     0.9% sodium chloride BOLUS     0.9% sodium chloride BOLUS     0.9% sodium chloride BOLUS     acetaminophen (TYLENOL) tablet 650 mg     albuterol (PROAIR HFA/PROVENTIL HFA/VENTOLIN HFA) 108 (90 Base) MCG/ACT inhaler 2 puff     albuterol (PROVENTIL) neb solution 2.5 mg     alteplase (CATHFLO ACTIVASE) injection 2 mg     alteplase (CATHFLO ACTIVASE) injection 2 mg     atorvastatin (LIPITOR) tablet 10 mg     benzocaine-menthol (CEPACOL) 15-3.6 MG lozenge 1 lozenge     budesonide-formoterol (SYMBICORT) 160-4.5 MCG/ACT Inhaler 1 puff     carvedilol (COREG) tablet 25 mg     cetirizine (zyrTEC) tablet 10 mg     glucose gel 15-30 g    Or     dextrose 50 % injection 25-50 mL    Or     glucagon injection 1 mg     dianeal PD LOW calcium-1.5% dex (calcium 2.5 mEq/L) 5,000 mL PERITONEAL DIALYSATE     dianeal PD LOW calcium-2.5% dex (calcium 2.5 mEq/L) 5,000 mL PERITONEAL DIALYSATE     furosemide (LASIX)  tablet 40 mg     gentamicin (GARAMYCIN) 0.1 % cream     heparin (porcine) injection 500 Units     heparin 10,000 units/10 mL infusion (DIALYSIS USE)     sodium chloride 0.9% DIALYSIS Cath LOCK - RED Lumen    Followed by     heparin 1000 unit/mL DIALYSIS Cath LOCK - RED Lumen     sodium chloride 0.9% DIALYSIS Cath LOCK - BLUE Lumen    Followed by     heparin 1000 unit/mL DIALYSIS Cath LOCK -BLUE Lumen     heparin ANTICOAGULANT injection 5,000 Units     HYDROmorphone (DILAUDID) tablet 2 mg     HYDROmorphone (PF) (DILAUDID) injection 0.5 mg     insulin aspart (NovoLOG) injection (RAPID ACTING)     insulin aspart (NovoLOG) injection (RAPID ACTING)     ipratropium - albuterol 0.5 mg/2.5 mg/3 mL (DUONEB) neb solution 3 mL     lidocaine (LMX4) cream     lidocaine 1 % 0.1-1 mL     LORazepam (ATIVAN) half-tab 0.25 mg     naloxone (NARCAN) injection 0.2 mg    Or     naloxone (NARCAN) injection 0.4 mg    Or     naloxone (NARCAN) injection 0.2 mg    Or     naloxone (NARCAN) injection 0.4 mg     OLANZapine (zyPREXA) tablet 2.5 mg     OLANZapine (zyPREXA) tablet 2.5 mg     omeprazole (priLOSEC) CR capsule 20 mg     ondansetron (ZOFRAN-ODT) ODT tab 4 mg    Or     ondansetron (ZOFRAN) injection 4 mg     piperacillin-tazobactam (ZOSYN) 3.375 g vial to attach to  mL bag     sertraline (ZOLOFT) tablet 50 mg     sevelamer carbonate (RENVELA) tablet 1,600 mg     sodium chloride (PF) 0.9% PF flush 10 mL     sodium chloride (PF) 0.9% PF flush 10 mL     sodium chloride (PF) 0.9% PF flush 3 mL     sodium chloride (PF) 0.9% PF flush 3 mL     sodium chloride (PF) 0.9% PF flush 3 mL     sodium chloride (PF) 0.9% PF flush 9 mL     sodium chloride (PF) 0.9% PF flush 9 mL     traZODone (DESYREL) half-tab 25 mg     traZODone (DESYREL) tablet 50 mg     vancomycin place ornelas - receiving intermittent dosing        LABS:  INR   Date Value Ref Range Status   08/21/2021 1.24 (H) 0.90 - 1.15 Final     Comment:     Effective 7/11/2021, the  "reference range for this assay has changed.   10/16/2019 0.99 0.86 - 1.14 Final      Hemoglobin   Date Value Ref Range Status   08/23/2021 9.6 (L) 13.3 - 17.7 g/dL Final   10/16/2019 9.5 (L) 13.3 - 17.7 g/dL Final   ]  Platelet Count   Date Value Ref Range Status   08/23/2021 212 150 - 450 10e3/uL Final   10/16/2019 258 150 - 450 10e9/L Final     Creatinine   Date Value Ref Range Status   08/23/2021 7.76 (HH) 0.70 - 1.30 mg/dL Final   10/16/2019 6.31 (H) 0.66 - 1.25 mg/dL Final     Potassium   Date Value Ref Range Status   08/23/2021 4.5 3.5 - 5.0 mmol/L Final   10/16/2019 5.5 (H) 3.4 - 5.3 mmol/L Final         EXAM:  BP (!) 143/69 (BP Location: Right arm)   Pulse 78   Temp 99.8  F (37.7  C) (Oral)   Resp 20   Ht 1.778 m (5' 10\")   Wt 102.5 kg (226 lb 1 oz)   SpO2 96%   BMI 32.44 kg/m        - HD dependent ESRD   - Dx with sepsis upon admission  - Tunneled HD catheter removed 8/23/2021 2/2 suspected source of infection  - Presence of a PD catheter that needs repositioning  - On Zosyn    Requesting replacement of tunneled HD catheter 8/24/2021 after 24 hour line holiday    PLAN:    Proceed with tunneled HD catheter placement, with sedation- PENDING AM CHART CHECK BY SARA    - Continue Zosyn per MAR  - NPO MN ordered  - Resume HD per nephrology  - PD cath repositioning as an outpatient   - Needs consent 8/24/2021        Thank you for kindly for this consultation.       MORRIS GAMINO, NP  Interventional Radiology  [phone]      "

## 2021-08-23 NOTE — PROGRESS NOTES
Addendum:  Patient strongly feels that he should try PD tonight and that he will get sick without it.  I discussed he would be fine without it tonight but he is adamant and will tolerate the pain associated with the cycler.  Dialysis ordered, 1.5% bag and 2.5% bag.  Still plan on HD tomorrow after CVC placed.    [facility]  [facility]  [phone]            RENAL PROGRESS NOTE    CC:  ESKD, fever, confusion    ASSESSMENT & PLAN:   ESRD - was on PD, exit and tunnel infx, PD cath removed, then replaced 7/29. Now on temporary IHD via CVC. Chemistries and volume looks controlled. No obvious fluid overload.   Gram stain on PD fluid neg and mildly elevated WBC  nontender abd and no evidence on exam of PD cath infx.   Recs:  - still complaints about drain pain despite tidal   - will IR for repositioning but they cannot accommodate at [facility], this will likely need to be as an outpatient  - plan HD via new CVC tomorrow     Fever - inc wbc, c/o pain (among other c/o) from CVC. Agree CVC is a likely source although negative cultures.  Will remove and give line holiday today.  Replace tomorrow PD cath no obvious infx or peritonitis    Lung infiltrates - he's had prominent recurrent c/o sob and he is not volume overloaded and had inc reticulonodular infiltrates. He's taken a lot of prednisone. ID considering OPAL and I discussed with them today     ACD on epo 20K weekly at PD clinic     Hyper PTH on renal diet and binders. renvela     HTN amlodipine and coreg     Depression, anxiety - on sertraline and other meds.  Very anxious, driving some of his outburst/anger I think.     [facility]  [facility]  [phone]            SUBJECTIVE:  Complains of ongoing issues with drain pain overnight despite tidal cycles.  I think the only way he'll tolerate further PD is with a repositioning.  He's interested in having IR try this to avoid another surgery.  Unfortunately in talking with IR,  they can't accomodate this at [facility], would need to be at [facility].    Discussed with ID, they favor his CVC coming out despite lack of positive cultures.  Esteban reports poor CVC function chronically (although RN at [facility] says it has been not quite a difficult as Esteban reports).  Regardless, will give him a line holiday today with plan for placement tomorrow and resumption of dialysis tomorrow.    OBJECTIVE:  Physical Exam   Temp: 99.8  F (37.7  C) Temp src: Oral BP: (!) 143/69 Pulse: 78   Resp: 20 SpO2: 96 % O2 Device: None (Room air)    Vitals:    08/21/21 1322 08/22/21 0630 08/23/21 0943   Weight: 100 kg (220 lb 6.4 oz) 102.2 kg (225 lb 3.2 oz) 102.5 kg (226 lb 1 oz)     Vital Signs with Ranges  Temp:  [98.4  F (36.9  C)-100.6  F (38.1  C)] 99.8  F (37.7  C)  Pulse:  [64-83] 78  Resp:  [16-22] 20  BP: (108-178)/(58-82) 143/69  SpO2:  [90 %-96 %] 96 %  I/O last 3 completed shifts:  In: -   Out: 900 [Urine:900]    @TMAXR(24)@    Patient Vitals for the past 72 hrs:   Weight   08/23/21 0943 102.5 kg (226 lb 1 oz)   08/22/21 0630 102.2 kg (225 lb 3.2 oz)   08/21/21 1322 100 kg (220 lb 6.4 oz)   08/20/21 2215 100 kg (220 lb 7.4 oz)       Intake/Output Summary (Last 24 hours) at 8/22/2021 0920  Last data filed at 8/22/2021 0630  Gross per 24 hour   Intake 1370 ml   Output 400 ml   Net 970 ml       PHYSICAL EXAM:  General - Alert and oriented x3, appears comfortable, NAD  Neck: RIJ CVC, mildly tender at exit site, no drainage  Cardiovascular - Regular rate and rhythm, no rub  Respiratory - Clear to auscultation bilaterally, no crackles or wheezes  Abd: BS present, PD cath nontender, no peritoneal signs  Extremities - No lower extremity edema bilaterally  Skin: dry, intact, no rash, good turgor  Neuro:  Grossly intact, no focal deficits  MSK:  Grossly intact  Psych: calm and appreciative    LABORATORY STUDIES:     Recent Labs   Lab 08/23/21  0534 08/22/21  0451 08/21/21  1124 08/20/21  2245   WBC 11.1* 13.4* 19.5* 17.6*    RBC 3.23* 3.22* 3.60* 3.86*   HGB 9.6* 9.5* 10.9* 11.5*   HCT 31.2* 31.0* 34.9* 36.8*    199 207 225       Basic Metabolic Panel:  Recent Labs   Lab 08/23/21  0837 08/23/21  0534 08/23/21  0008 08/22/21  2046 08/22/21  1630 08/22/21  1146 08/22/21  0451 08/20/21 2245 08/20/21 2243 08/20/21 2243   NA  --  141  --   --   --   --  139 138  --  139   POTASSIUM  --  4.5  --   --   --   --  4.3 4.4  --  4.8   CHLORIDE  --  109*  --   --   --   --  107 104  --  106   CO2  --  18*  --   --   --   --  21* 23  --  21*   BUN  --  47*  --   --   --   --  51* 39*  --  38*   CR  --  7.76*  --   --   --   --  7.54* 5.32*  --  5.39*   GLC 91 107 111 106 104 144* 110 117   < > 110   NISREEN  --  7.2*  --   --   --   --  7.1* 8.6  --  8.5    < > = values in this interval not displayed.       INR  Recent Labs   Lab 08/21/21  1551 08/20/21 2245   INR 1.24* 1.11        Recent Labs   Lab Test 08/23/21  0534 08/22/21  0451 08/21/21  1551 08/20/21 2245   INR  --   --  1.24* 1.11   WBC 11.1* 13.4*  --  17.6*   HGB 9.6* 9.5*  --  11.5*    199  --  225       Personally reviewed current labs

## 2021-08-23 NOTE — PROGRESS NOTES
Spiritual Health Note    Spiritual Assessment:     visited patient due to consult. Patient was lying in bed and stated that he was very tired. He declined visit at this time, but welcomes a visit tomorrow.     Care Provided:    Introduced self and role of Spiritual Care     Plan of Care: A  will continue to visit as able or per request by patient/family/staff.        lisa Brown MDiv, [facility]

## 2021-08-23 NOTE — PROGRESS NOTES
Care Management Initial Consult    General Information  Assessment completed with: Patient,    Type of CM/SW Visit: Initial Assessment    Primary Care Provider verified and updated as needed: Yes   Readmission within the last 30 days:           Advance Care Planning:            Communication Assessment  Patient's communication style: spoken language (English or Bilingual)    Hearing Difficulty or Deaf: no   Wear Glasses or Blind: no    Cognitive  Cognitive/Neuro/Behavioral: WDL  Level of Consciousness: somnolent  Arousal Level: arouses to voice  Orientation: oriented x 4  Mood/Behavior: angry (very frustrated with situation )  Best Language: 0 - No aphasia  Speech: slow    Living Environment:   People in home: significant other     Current living Arrangements: house      Able to return to prior arrangements: yes       Family/Social Support:  Care provided by: self  Provides care for: no one  Marital Status: Lives with Significant Other  Significant Other          Description of Support System: Supportive, Involved         Current Resources:   Patient receiving home care services: No     Community Resources: None  Equipment currently used at home: none  Supplies currently used at home: None    Employment/Financial:  Employment Status: retired        Financial Concerns: No concerns identified   Referral to Financial Counselor: No       Lifestyle & Psychosocial Needs:  Social Determinants of Health     Tobacco Use: Medium Risk     Smoking Tobacco Use: Former Smoker     Smokeless Tobacco Use: Never Used   Alcohol Use:      Frequency of Alcohol Consumption:      Average Number of Drinks:      Frequency of Binge Drinking:    Financial Resource Strain:      Difficulty of Paying Living Expenses:    Food Insecurity:      Worried About Running Out of Food in the Last Year:      Ran Out of Food in the Last Year:    Transportation Needs:      Lack of Transportation (Medical):      Lack of Transportation (Non-Medical):     Physical Activity:      Days of Exercise per Week:      Minutes of Exercise per Session:    Stress:      Feeling of Stress :    Social Connections:      Frequency of Communication with Friends and Family:      Frequency of Social Gatherings with Friends and Family:      Attends Scientologist Services:      Active Member of Clubs or Organizations:      Attends Club or Organization Meetings:      Marital Status:    Intimate Partner Violence:      Fear of Current or Ex-Partner:      Emotionally Abused:      Physically Abused:      Sexually Abused:    Depression: Not at risk     PHQ-2 Score: 2   Housing Stability:      Unable to Pay for Housing in the Last Year:      Number of Places Lived in the Last Year:      Unstable Housing in the Last Year:        Functional Status:  Prior to admission patient needed assistance:              Mental Health Status:  Mental Health Status: No Current Concerns       Chemical Dependency Status:  Chemical Dependency Status: No Current Concerns             Values/Beliefs:  Spiritual, Cultural Beliefs, Scientologist Practices, Values that affect care: no               Additional Information:    HARI introduced self and CM services to Pt.  Pt lives in a private residence with his significant other.  Pt is independent at baseline with all ADLs.  Pt does not use any equipment, drives, and is retired.  Pt does not express any concerns about returning home and states that his significant other will be able to assist as needed upon discharge. Significant other to transport.     IGNACIO Mackay

## 2021-08-23 NOTE — PROGRESS NOTES
0515 Called for HHN Duoneb tx 2nd to wheezing, BS clear and diminished bilaterally with mild upper airway E/wheez. No change with tx. RT to amadeoior

## 2021-08-23 NOTE — PLAN OF CARE
Problem: Pain Acute  Goal: Acceptable Pain Control and Functional Ability  Outcome: Improving: Pt had a 4/10 headache this morning. PRN Tylenol given with effective results.    Problem: Hypertension Acute  Goal: Blood Pressure Within Desired Range  Outcome: Improving: /70, 143/69, 156/76 and 170/78      Problem: Glycemic Control Impaired (Sepsis/Septic Shock)  Goal: Blood Glucose Level Within Desired Range  Outcome: Improving: BS 91, 144 and 115    HR: NSR  A1/SBA for transfers  Pt had IR procedure: CVC removed. Will replace CVC tomorrow   PD tonight, all orders in  Expiratory wheezing RUQ.  Pt had PRN Albuterol inhaler at 1624 and PRN Duo neb at 1829

## 2021-08-23 NOTE — PLAN OF CARE
Problem: Glycemic Control Impaired (Sepsis/Septic Shock)  Goal: Blood Glucose Level Within Desired Range  Outcome: No Change     Problem: Adjustment to Illness (Sepsis/Septic Shock)  Goal: Optimal Coping  Outcome: Declining     Problem: Infection Progression (Sepsis)  Goal: Absence of Infection Signs and Symptoms  Outcome: Declining     Problem: Adult Inpatient Plan of Care  Goal: Absence of Hospital-Acquired Illness or Injury  Intervention: Identify and Manage Fall Risk  Recent Flowsheet Documentation  Taken 8/22/2021 1553 by Leatha Prasad, RN  Safety Promotion/Fall Prevention:   mobility aid in reach   room door open   bed alarm on  Intervention: Prevent Skin Injury  Recent Flowsheet Documentation  Taken 8/22/2021 1551 by Leatha Prasad, RN  Body Position:   position changed independently   supine   Esteban is having a rough shift. 2 times this shift has had sudden urge to have a BM and severe nausea. Noted with this is a drop in Bp, diaphoresis. Blood sugars are ok during and no dizziness noted. After episode resolves he is very sleepy. When it happened a second time, VIRA Cornejo at bedside to assess pt. Lactic acid drawn and noted to have chills. Temp max of 100.6 oral. Unable to start PD on time due to pt condition

## 2021-08-23 NOTE — CONSULTS
"    INITIAL PSYCHIATRIC CONSULTATION  This note was created with the help of Dragon dictation system. Grammatical and typing errors are not intentional.               REASON FOR REQUEST: Patient espoused SI to bedside nurse stating that he has recently considered having an accident with a car and often wishes he would go to sleep and not wake up      ASSESSMENT/RECOMMENDATIONS/PLAN :   Acute crisis, emotional reaction due to medical issues, poor communication between providers(Kidney), pain.  Mood disorder due to medical condition, acute on chronic pain.  Altered mental status/metabolic encephalopathy due to medical issues: resolved.   Sleep difficulties due to pain and hospitalization.  Depression with anxiety.     Recommendations:  Lorazepam 0.25 mg QID PRN for anxiety and panic symptoms.  Olanzapine 2.5 mg TID x 6 doses.  Trazodone 25 mg at bedtime.  Psychology consult.  Spiritual care consult  Following    MENTAL STATUS EXAMINATION:   General Appearance: Not in acute distress, resting comfortably in bed.    Behavior: Good eye contact, no bizarre ideations  Speech: Coherent.  Thought Process: Linear, clear.  Thought content: No evidence of hallucinations, delusions or paranoia.    Thought Formation: Associations are connected  Judgment: Fair  Insight : Intact  Attention : Adequate  Memory: Sufficient  Fund Of Knowledge: Average  Affect: Neutral  Mood: Congruent  Alert : Awake  Suicidal ideation: Absent  Homicidal ideation: Absent  Orientation: X 4  Comprehension: Sufficient pertaining to current medical needs  Generative thought content: Adequate.  Spontaneous conversation  Language: Intact  Gait and Ambulation: Gait and ambulation at baseline.    Musculoskeletal: No tonal abnormalities      BP (!) 143/69 (BP Location: Right arm)   Pulse 78   Temp 99.8  F (37.7  C) (Oral)   Resp 20   Ht 1.778 m (5' 10\")   Wt 102.5 kg (226 lb 1 oz)   SpO2 96%   BMI 32.44 kg/m        HISTORY OF PRESENT ILLNESS:   Presenting " "history to include: Per HMS/Specialists:   Patient is a 70 year old male with  has a past medical history of Anxiety and depression, Asthma, severe persistent, poorly-controlled (1958), ESRD (end stage renal disease) on dialysis (H), HLD (hyperlipidemia), Hypertension, IVY (obstructive sleep apnea), and Type 2 diabetes mellitus (H) (09/01/1980). comes in for AMS and fever.  History limited per patient's AMS. Obtained history from ED provider's note:   \"Dylon Trevizo is a 70 year old male with pertinent medical history of DM II, HTN, CKD stage V (MWF dialysis), who presents by EMS for evaluation of altered mental status and fever.    Per chart review, patient seen at  [facility] Emergency Department on 8/18 (~2 days ago) for evaluation of shortness of breath. Patient had only half and run on (8/13)and then missed the next dialysis (8/16) prior to the visit and he normally is MWF. He had 2 L taken off on 8/13 (~7 days ago). Chest X-ray showed no evidence of pneumonia. BMP showed hyperkalemia with potassium of 6.3. IV calcium, insulin, and glucose were administered. CBC showed elevated white blood count. Patient had been on prednisone 40 mg since 8/16 (~4 days ago). Patient was discharged home with plan to complete a dialysis run the next day.     History Limited t Per Patient Due to Patient's Altered Mental Status   Per patient, he states he feels weak, fatigued, and warm. He endorses coughing and vague chest pain. Patient is alert to person, place, and time, but gets confused to historic timeline.    Per EMS, patient's significant other came home this evening around 2030 (~2 hours ago) and found her  to be confused. Patient has had an infection to peritoneal dialysis port recently and has been on antibiotics.Per significant other, he has been having difficulty with his tunnel vascular catheter working, so he has been unable to complete his dialysis, which landed patient in the ED at [facility] on 8/18 (~2 " "days ago). They treated patient for lung infection and got steroids. In addition he has had a high potassium. He was able to complete dialysis 8/17, 8/18, and 8/19, but they had trouble getting enough volume off. Today patient was outside mowing the lawn today, but is unsure of how long he stayed outside. This afternoon when he came back inside patient reported feeling warm and was confused. She states he suddenly started to decompensate and it got worse through the night. He has peritoneal dialysis catheter that was recently placed resulting, which patient has tried to use, but it was giving him pain, so they told him to give him time. Patient has been coughing, but no hemoptysis. She states he received his COVID vaccines.\" Patient complains of chest pain at dialysis site and lightheadedness.      Upon assessment, Mr Trevizo was noted to be pleasant, cooperative and engaging coherent conversation.  He remembered making a comment of wanting to die but he said that he was frustrated, angry and in pain.  \"No one was listening to me my kidney doctors are not listening to me did not listen to me when I was outpatient telling them that something was not right but they kept saying that there was nothing wrong with me\".  He was pointing to his catheters that they were infected but he was not treated with \" strong antibiotics. No one listened to me when I raised concerns over my Potassium level\". He is happy that he is in hospital and \" hopefully I get some answers\".   His anger was directed toward \"Kidney doctor and the assistant\" that \" they walk in here and tell me what they think is going on with me, show no empathy. The clinic I go to for dialysis, I got more information from a nurse who explained everything to me but she marlena not work there anymore, I am not sure why\" \" Yes I was angry\" No reports of hallucinations, delusions or paranoia. .    He endorsed of discomfort, poor sleep, anxiety and nervousness \" over not " "knowing what they are going to do\".   He denied any thoughts of SI. Denied any intent or plan.     He would like to go to a different dialysis center that the one he is going to right now \" because there is none where I live, I live in [place]\". He is frustrated over his choices of dialysis center. \" In [place] there is nothing where I live, I drive an hour right now\".     He has been compliant with his cares and assures me that he feels safe in hospital. He does not have any thoughts of harming himself. I discussed trial of some medication to help with anxiety and discomfort, he is amenable to it.   Reviewed home medications.  He is on Zoloft 50 mg.  Olanzapine 2.5 mg at bedtime.  Discussed trial of olanzapine 2.5 mg 3 times a day for anxiety for 2 days, giving him the option to decline the medication if not anxious.  He likes to have some control over his medical care right now given that in hospital he is completely dependent on \"others\".    Review of Systems:As per HPI. Remainders of 12 point review of systems negative.  Psychiatric ROS:  Change of Interest/Anhedonia:  No  Appetite/Weight Changes: No  Concentration Changes:No  Impaired Energy:No  Impaired Sleep: Yes  Anxiety/Panic:Yes  Tearfulness:No  Depression:No Frustration: yes  Psychosis: No  Irritability:Yes  SI/VI/HI: No,No,No  Karlene: No              PFSH reviewed  and not pertinent to chief complaint/reason for visit  BP (!) 142/70 (BP Location: Right arm)   Pulse 67   Temp 98.5  F (36.9  C) (Oral)   Resp 16   Ht 1.778 m (5' 10\")   Wt 102.2 kg (225 lb 3.2 oz)   SpO2 95%   BMI 32.31 kg/m    No results found for: AMPHET, PCP, BARBIT, OXYCODONE, THC, ETOH  @24HOURRESULTS@      PMH:   Past Medical History:   Diagnosis Date     Anxiety and depression      Asthma, severe persistent, poorly-controlled 1958     ESRD (end stage renal disease) on dialysis (H)      HLD (hyperlipidemia)      Hypertension      IVY (obstructive sleep apnea)      Type 2 " diabetes mellitus (H) 09/01/1980           Current Medications:Scheduled Meds:    atorvastatin  10 mg Oral Daily     carvedilol  25 mg Oral BID w/meals     cetirizine  10 mg Oral Daily     furosemide  40 mg Oral Daily     heparin ANTICOAGULANT  5,000 Units Subcutaneous Q8H     insulin aspart  1-3 Units Subcutaneous TID AC     insulin aspart  1-3 Units Subcutaneous At Bedtime     OLANZapine  2.5 mg Oral At Bedtime     piperacillin-tazobactam  3.375 g Intravenous Q12H     sertraline  50 mg Oral Daily     sevelamer carbonate  1,600 mg Oral TID w/meals     sodium chloride (PF)  3 mL Intracatheter Q8H     vancomycin place ornelas - receiving intermittent dosing  1 each Intravenous See Admin Instructions     Continuous Infusions:    peritoneal dialysis with additives Stopped (08/23/21 0741)     PRN Meds:.acetaminophen, albuterol, albuterol, sore throat lozenge, budesonide-formoterol, glucose **OR** dextrose **OR** glucagon, gentamicin, HYDROmorphone, HYDROmorphone, ipratropium - albuterol 0.5 mg/2.5 mg/3 mL, lidocaine 4%, lidocaine (buffered or not buffered), naloxone **OR** naloxone **OR** naloxone **OR** naloxone, omeprazole, ondansetron **OR** ondansetron, sodium chloride (PF), sodium chloride (PF), traZODone                Family History: PERSONALLY REVIEWED.  Family History   Problem Relation Age of Onset     Lung Cancer Father      Heart Disease Father      Lung Cancer Sister      Lung Cancer Brother      Heart Disease Brother      Heart Disease Mother      Pertinent Family hx not pertinent to Chief Complaint or reason for visit.     Social History:  PERSONALLY REVIEWED.  Social History     Socioeconomic History     Marital status:      Spouse name: Not on file     Number of children: Not on file     Years of education: Not on file     Highest education level: Not on file   Occupational History     Not on file   Tobacco Use     Smoking status: Former Smoker     Quit date: 01/1999     Years since quitting:  22.6     Smokeless tobacco: Never Used   Substance and Sexual Activity     Alcohol use: Yes     Comment: Not currently     Drug use: Not Currently     Sexual activity: Not on file   Other Topics Concern     Parent/sibling w/ CABG, MI or angioplasty before 65F 55M? Not Asked   Social History Narrative     Not on file     Social Determinants of Health     Financial Resource Strain:      Difficulty of Paying Living Expenses:    Food Insecurity:      Worried About Running Out of Food in the Last Year:      Ran Out of Food in the Last Year:    Transportation Needs:      Lack of Transportation (Medical):      Lack of Transportation (Non-Medical):    Physical Activity:      Days of Exercise per Week:      Minutes of Exercise per Session:    Stress:      Feeling of Stress :    Social Connections:      Frequency of Communication with Friends and Family:      Frequency of Social Gatherings with Friends and Family:      Attends Oriental orthodox Services:      Active Member of Clubs or Organizations:      Attends Club or Organization Meetings:      Marital Status:    Intimate Partner Violence:      Fear of Current or Ex-Partner:      Emotionally Abused:      Physically Abused:      Sexually Abused:     not pertinent to Chief Complaint or reason for visit.             Allergies as of 06/01/2014 Reviewed     Review of Systems:As per HPI. Remainders of 12 point review of systems negative.    Review of Pertinent Laboratory:      PERSONALLY REVIEWED.    Physical Exam: Temp:  [98.1  F (36.7  C)-100.6  F (38.1  C)] 98.5  F (36.9  C)  Pulse:  [64-84] 67  Resp:  [16-22] 16  BP: (108-178)/(58-89) 142/70  SpO2:  [90 %-95 %] 95 %   Vitals: reviewed in chart     Physical exam as per medical team: reviewed in chart      diagnoses, risk and benefits of medications discussed with staff. Care coordination with care management team.   Thank you for this consultation.       Ainsley Live; CNP  Mental health & Addiction Services        This note was  created with the help of Dragon dictation system. Grammatical and typing errors are not intentional.

## 2021-08-23 NOTE — PLAN OF CARE
Problem: Adult Inpatient Plan of Care  Goal: Plan of Care Review  Outcome: Improving   Patient still is c/o abdominal cramping during drain cycle of PD run. Medicated with dilaudid IV x1.     Pt had no further issues with nausea/diarrhea. VSS and he remained afebrile.  Neb given for wheezing.

## 2021-08-23 NOTE — PROCEDURES
CCPD ON AT 2330    Tx time is 7 hours  1 bag of 1.5% Dextrose  5 cycles   1 L fill volume  No last fill  10% Tidal    CCPD machine was reset up after error    Rani Smith RN on 8/22/2021 at 11:54 PM

## 2021-08-23 NOTE — PROCEDURES
CCPD set up per Dr Westbrook's orders:    Therapy time:  7 hours  Cycles:  5  Fill Volume:  1000 ml  Last Fill Vol:  0 ml  Total Volume:  5000 ml  Dextrose:  1.5% (One 5000 ml bag used)    Programed for Tidal Treatment base. Primary RN will connect later this evening. At the time of set up pt was c/o nausea and some chest discomfort, drop in BP.   Reported to Leatha DELA CRUZ

## 2021-08-24 ENCOUNTER — APPOINTMENT (OUTPATIENT)
Dept: INTERVENTIONAL RADIOLOGY/VASCULAR | Facility: HOSPITAL | Age: 71
DRG: 907 | End: 2021-08-24
Attending: INTERNAL MEDICINE
Payer: MEDICARE

## 2021-08-24 LAB
ALBUMIN SERPL-MCNC: 2.3 G/DL (ref 3.5–5)
ALP SERPL-CCNC: 75 U/L (ref 45–120)
ALT SERPL W P-5'-P-CCNC: <9 U/L (ref 0–45)
ANION GAP SERPL CALCULATED.3IONS-SCNC: 13 MMOL/L (ref 5–18)
AST SERPL W P-5'-P-CCNC: 10 U/L (ref 0–40)
BACTERIA PRT CULT: NO GROWTH
BASOPHILS # BLD AUTO: 0 10E3/UL (ref 0–0.2)
BASOPHILS NFR BLD AUTO: 0 %
BILIRUB SERPL-MCNC: 0.4 MG/DL (ref 0–1)
BUN SERPL-MCNC: 48 MG/DL (ref 8–22)
C REACTIVE PROTEIN LHE: 16.7 MG/DL (ref 0–0.8)
CALCIUM SERPL-MCNC: 6.8 MG/DL (ref 8.5–10.5)
CHLORIDE BLD-SCNC: 109 MMOL/L (ref 98–107)
CO2 SERPL-SCNC: 19 MMOL/L (ref 22–31)
CREAT SERPL-MCNC: 8.5 MG/DL (ref 0.7–1.3)
EOSINOPHIL # BLD AUTO: 0.7 10E3/UL (ref 0–0.7)
EOSINOPHIL NFR BLD AUTO: 7 %
ERYTHROCYTE [DISTWIDTH] IN BLOOD BY AUTOMATED COUNT: 14.4 % (ref 10–15)
FIBRINOGEN PPP-MCNC: 858 MG/DL (ref 170–490)
GFR SERPL CREATININE-BSD FRML MDRD: 6 ML/MIN/1.73M2
GLUCOSE BLD-MCNC: 103 MG/DL (ref 70–125)
GLUCOSE BLDC GLUCOMTR-MCNC: 103 MG/DL (ref 70–125)
GLUCOSE BLDC GLUCOMTR-MCNC: 142 MG/DL (ref 70–125)
GLUCOSE BLDC GLUCOMTR-MCNC: 79 MG/DL (ref 70–125)
GLUCOSE BLDC GLUCOMTR-MCNC: 80 MG/DL (ref 70–125)
HCT VFR BLD AUTO: 29.3 % (ref 40–53)
HGB BLD-MCNC: 9.1 G/DL (ref 13.3–17.7)
IMM GRANULOCYTES # BLD: 0.1 10E3/UL
IMM GRANULOCYTES NFR BLD: 1 %
LYMPHOCYTES # BLD AUTO: 1.9 10E3/UL (ref 0.8–5.3)
LYMPHOCYTES NFR BLD AUTO: 21 %
MCH RBC QN AUTO: 29.6 PG (ref 26.5–33)
MCHC RBC AUTO-ENTMCNC: 31.1 G/DL (ref 31.5–36.5)
MCV RBC AUTO: 95 FL (ref 78–100)
MONOCYTES # BLD AUTO: 0.7 10E3/UL (ref 0–1.3)
MONOCYTES NFR BLD AUTO: 8 %
NEUTROPHILS # BLD AUTO: 5.8 10E3/UL (ref 1.6–8.3)
NEUTROPHILS NFR BLD AUTO: 63 %
NRBC # BLD AUTO: 0 10E3/UL
NRBC BLD AUTO-RTO: 0 /100
PLATELET # BLD AUTO: 222 10E3/UL (ref 150–450)
POTASSIUM BLD-SCNC: 4.3 MMOL/L (ref 3.5–5)
PROT SERPL-MCNC: 6.1 G/DL (ref 6–8)
RBC # BLD AUTO: 3.07 10E6/UL (ref 4.4–5.9)
SODIUM SERPL-SCNC: 141 MMOL/L (ref 136–145)
VANCOMYCIN SERPL-MCNC: 26.1 MG/L
WBC # BLD AUTO: 9.2 10E3/UL (ref 4–11)

## 2021-08-24 PROCEDURE — 250N000013 HC RX MED GY IP 250 OP 250 PS 637: Performed by: FAMILY MEDICINE

## 2021-08-24 PROCEDURE — 86141 C-REACTIVE PROTEIN HS: CPT | Performed by: FAMILY MEDICINE

## 2021-08-24 PROCEDURE — 250N000011 HC RX IP 250 OP 636: Performed by: FAMILY MEDICINE

## 2021-08-24 PROCEDURE — 250N000013 HC RX MED GY IP 250 OP 250 PS 637

## 2021-08-24 PROCEDURE — 76937 US GUIDE VASCULAR ACCESS: CPT

## 2021-08-24 PROCEDURE — 250N000009 HC RX 250: Performed by: FAMILY MEDICINE

## 2021-08-24 PROCEDURE — 36415 COLL VENOUS BLD VENIPUNCTURE: CPT | Performed by: INTERNAL MEDICINE

## 2021-08-24 PROCEDURE — 02H633Z INSERTION OF INFUSION DEVICE INTO RIGHT ATRIUM, PERCUTANEOUS APPROACH: ICD-10-PCS | Performed by: RADIOLOGY

## 2021-08-24 PROCEDURE — 87340 HEPATITIS B SURFACE AG IA: CPT | Performed by: INTERNAL MEDICINE

## 2021-08-24 PROCEDURE — 99152 MOD SED SAME PHYS/QHP 5/>YRS: CPT

## 2021-08-24 PROCEDURE — 94640 AIRWAY INHALATION TREATMENT: CPT

## 2021-08-24 PROCEDURE — C1750 CATH, HEMODIALYSIS,LONG-TERM: HCPCS

## 2021-08-24 PROCEDURE — C1769 GUIDE WIRE: HCPCS

## 2021-08-24 PROCEDURE — 99233 SBSQ HOSP IP/OBS HIGH 50: CPT | Performed by: INTERNAL MEDICINE

## 2021-08-24 PROCEDURE — 0JH63XZ INSERTION OF TUNNELED VASCULAR ACCESS DEVICE INTO CHEST SUBCUTANEOUS TISSUE AND FASCIA, PERCUTANEOUS APPROACH: ICD-10-PCS | Performed by: RADIOLOGY

## 2021-08-24 PROCEDURE — 210N000001 HC R&B IMCU HEART CARE

## 2021-08-24 PROCEDURE — 80053 COMPREHEN METABOLIC PANEL: CPT | Performed by: FAMILY MEDICINE

## 2021-08-24 PROCEDURE — 99207 PR NO CHARGE LOS: CPT | Performed by: INTERNAL MEDICINE

## 2021-08-24 PROCEDURE — 99232 SBSQ HOSP IP/OBS MODERATE 35: CPT | Performed by: INTERNAL MEDICINE

## 2021-08-24 PROCEDURE — 85384 FIBRINOGEN ACTIVITY: CPT | Performed by: FAMILY MEDICINE

## 2021-08-24 PROCEDURE — 250N000011 HC RX IP 250 OP 636: Performed by: RADIOLOGY

## 2021-08-24 PROCEDURE — 85025 COMPLETE CBC W/AUTO DIFF WBC: CPT | Performed by: FAMILY MEDICINE

## 2021-08-24 PROCEDURE — 999N000157 HC STATISTIC RCP TIME EA 10 MIN

## 2021-08-24 PROCEDURE — 272N000504 HC NEEDLE CR4

## 2021-08-24 PROCEDURE — 36415 COLL VENOUS BLD VENIPUNCTURE: CPT | Performed by: FAMILY MEDICINE

## 2021-08-24 PROCEDURE — 250N000013 HC RX MED GY IP 250 OP 250 PS 637: Performed by: NURSE PRACTITIONER

## 2021-08-24 PROCEDURE — 77001 FLUOROGUIDE FOR VEIN DEVICE: CPT

## 2021-08-24 PROCEDURE — 80202 ASSAY OF VANCOMYCIN: CPT | Performed by: INTERNAL MEDICINE

## 2021-08-24 RX ORDER — FENTANYL CITRATE 50 UG/ML
INJECTION, SOLUTION INTRAMUSCULAR; INTRAVENOUS PRN
Status: COMPLETED | OUTPATIENT
Start: 2021-08-24 | End: 2021-08-24

## 2021-08-24 RX ORDER — NALOXONE HYDROCHLORIDE 0.4 MG/ML
0.2 INJECTION, SOLUTION INTRAMUSCULAR; INTRAVENOUS; SUBCUTANEOUS
Status: DISCONTINUED | OUTPATIENT
Start: 2021-08-24 | End: 2021-08-25

## 2021-08-24 RX ORDER — FENTANYL CITRATE 50 UG/ML
25-50 INJECTION, SOLUTION INTRAMUSCULAR; INTRAVENOUS EVERY 5 MIN PRN
Status: DISCONTINUED | OUTPATIENT
Start: 2021-08-24 | End: 2021-08-24

## 2021-08-24 RX ORDER — FLUMAZENIL 0.1 MG/ML
0.2 INJECTION, SOLUTION INTRAVENOUS
Status: DISCONTINUED | OUTPATIENT
Start: 2021-08-24 | End: 2021-08-24

## 2021-08-24 RX ORDER — NALOXONE HYDROCHLORIDE 0.4 MG/ML
0.4 INJECTION, SOLUTION INTRAMUSCULAR; INTRAVENOUS; SUBCUTANEOUS
Status: DISCONTINUED | OUTPATIENT
Start: 2021-08-24 | End: 2021-08-25

## 2021-08-24 RX ADMIN — HEPARIN SODIUM 5000 UNITS: 10000 INJECTION, SOLUTION INTRAVENOUS; SUBCUTANEOUS at 20:28

## 2021-08-24 RX ADMIN — FENTANYL CITRATE 50 MCG: 50 INJECTION, SOLUTION INTRAMUSCULAR; INTRAVENOUS at 13:25

## 2021-08-24 RX ADMIN — OLANZAPINE 2.5 MG: 2.5 TABLET ORAL at 20:27

## 2021-08-24 RX ADMIN — TRAZODONE HYDROCHLORIDE 25 MG: 50 TABLET ORAL at 20:27

## 2021-08-24 RX ADMIN — OLANZAPINE 2.5 MG: 2.5 TABLET ORAL at 15:53

## 2021-08-24 RX ADMIN — MIDAZOLAM 1 MG: 1 INJECTION INTRAMUSCULAR; INTRAVENOUS at 13:30

## 2021-08-24 RX ADMIN — ALBUTEROL SULFATE 2.5 MG: 2.5 SOLUTION RESPIRATORY (INHALATION) at 11:42

## 2021-08-24 RX ADMIN — CARVEDILOL 25 MG: 12.5 TABLET, FILM COATED ORAL at 19:18

## 2021-08-24 RX ADMIN — HYDROMORPHONE HYDROCHLORIDE 0.5 MG: 1 INJECTION, SOLUTION INTRAMUSCULAR; INTRAVENOUS; SUBCUTANEOUS at 05:54

## 2021-08-24 RX ADMIN — PIPERACILLIN SODIUM AND TAZOBACTAM SODIUM 3.38 G: 3; .375 INJECTION, POWDER, LYOPHILIZED, FOR SOLUTION INTRAVENOUS at 21:45

## 2021-08-24 RX ADMIN — HYDROMORPHONE HYDROCHLORIDE 2 MG: 2 TABLET ORAL at 21:44

## 2021-08-24 RX ADMIN — MIDAZOLAM 1 MG: 1 INJECTION INTRAMUSCULAR; INTRAVENOUS at 13:23

## 2021-08-24 RX ADMIN — HYDROMORPHONE HYDROCHLORIDE 2 MG: 2 TABLET ORAL at 15:53

## 2021-08-24 RX ADMIN — HEPARIN SODIUM 5000 UNITS: 10000 INJECTION, SOLUTION INTRAVENOUS; SUBCUTANEOUS at 04:15

## 2021-08-24 RX ADMIN — CETIRIZINE HYDROCHLORIDE 10 MG: 10 TABLET ORAL at 12:13

## 2021-08-24 RX ADMIN — OLANZAPINE 2.5 MG: 2.5 TABLET ORAL at 12:10

## 2021-08-24 RX ADMIN — FENTANYL CITRATE 50 MCG: 50 INJECTION, SOLUTION INTRAMUSCULAR; INTRAVENOUS at 13:32

## 2021-08-24 RX ADMIN — ACETAMINOPHEN 650 MG: 325 TABLET ORAL at 20:27

## 2021-08-24 RX ADMIN — SEVELAMER CARBONATE 1600 MG: 800 TABLET, FILM COATED ORAL at 19:18

## 2021-08-24 RX ADMIN — ACETAMINOPHEN 650 MG: 325 TABLET ORAL at 04:15

## 2021-08-24 RX ADMIN — CARVEDILOL 25 MG: 12.5 TABLET, FILM COATED ORAL at 12:09

## 2021-08-24 RX ADMIN — PIPERACILLIN SODIUM AND TAZOBACTAM SODIUM 3.38 G: 3; .375 INJECTION, POWDER, LYOPHILIZED, FOR SOLUTION INTRAVENOUS at 11:06

## 2021-08-24 RX ADMIN — ACETAMINOPHEN 650 MG: 325 TABLET ORAL at 12:09

## 2021-08-24 ASSESSMENT — MIFFLIN-ST. JEOR: SCORE: 1790.48

## 2021-08-24 NOTE — PROGRESS NOTES
RENAL PROGRESS NOTE    CC:  ESKD, fever, confusion    ASSESSMENT & PLAN:   ESRD - was on PD, exit and tunnel infx, PD cath removed, then replaced 7/29. Now on temporary IHD via CVC. Chemistries and volume looks controlled. No obvious fluid overload.   Gram stain on PD fluid neg and mildly elevated WBC  nontender abd and no evidence on exam of PD cath infx.   Recs:  - still complaints about drain pain despite tidal   - will IR for repositioning but they cannot accommodate at [facility]  - discussed with Dr. Foley, he will try to schedule for surgical repositioning  - plan HD today after CVC placement     Fever - inc wbc, c/o pain (among other c/o) from CVC. Agree CVC is a likely source although negative cultures.  S/p removal and replace today.  Continues on antibiotics.  Following cultures, ID continues to follow    Lung infiltrates - he's had prominent recurrent c/o sob and he is not volume overloaded and had inc reticulonodular infiltrates. He's taken a lot of prednisone. ID considering OPAL and I discussed with them and further testing pending     ACD on epo 20K weekly at PD clinic, hgb in the 9s currently     Hyper PTH on renal diet and binders. renvela     HTN amlodipine and coreg     Depression, anxiety - on sertraline and other meds.  Very anxious, driving some of his outburst/anger I think.     Chris Westbrook  [facility]  [phone]                SUBJECTIVE:  Issues with cycler overnight, sounds as if bags were stacked?  Will do a manual drain as he felt full this morning.  Very frustrated with not being able to get PD cath repositioned by IR.  Discussed at length, will ask Dr. Foley to see if he can reposition.  Plan HD today after CVC placement for clearance and volume removal (low clearance with only low volume PD).    OBJECTIVE:  Physical Exam   Temp: 97.5  F (36.4  C) Temp src: Oral BP: 123/66 Pulse: 75   Resp: 18 SpO2: (!) 89 % O2 Device: None (Room air)    Vitals:    08/23/21  0943 08/23/21 1520 08/24/21 0700   Weight: 102.5 kg (226 lb 1 oz) 102.5 kg (226 lb 1 oz) 101.9 kg (224 lb 11.2 oz)     Vital Signs with Ranges  Temp:  [97.4  F (36.3  C)-98.6  F (37  C)] 97.5  F (36.4  C)  Pulse:  [56-86] 75  Resp:  [14-20] 18  BP: (112-191)/(55-85) 123/66  SpO2:  [87 %-100 %] 89 %  I/O last 3 completed shifts:  In: 240 [P.O.:240]  Out: 615 [Urine:615]    @TMAXR(24)@    Patient Vitals for the past 72 hrs:   Weight   08/24/21 0700 101.9 kg (224 lb 11.2 oz)   08/23/21 1520 102.5 kg (226 lb 1 oz)   08/23/21 0943 102.5 kg (226 lb 1 oz)   08/22/21 0630 102.2 kg (225 lb 3.2 oz)       Intake/Output Summary (Last 24 hours) at 8/22/2021 0920  Last data filed at 8/22/2021 0630  Gross per 24 hour   Intake 1370 ml   Output 400 ml   Net 970 ml       PHYSICAL EXAM:  General - Alert and oriented x3, appears comfortable, NAD  Neck: RIJ CVC, mildly tender at exit site, no drainage  Cardiovascular - Regular rate and rhythm, no rub  Respiratory - Clear to auscultation bilaterally, no crackles or wheezes  Abd: BS present, PD cath nontender, no peritoneal signs  Extremities - No lower extremity edema bilaterally  Skin: dry, intact, no rash, good turgor  Neuro:  Grossly intact, no focal deficits  MSK:  Grossly intact  Psych: remains easily irritable    LABORATORY STUDIES:     Recent Labs   Lab 08/24/21  0502 08/23/21  0534 08/22/21  0451 08/21/21  1124 08/20/21  2245   WBC 9.2 11.1* 13.4* 19.5* 17.6*   RBC 3.07* 3.23* 3.22* 3.60* 3.86*   HGB 9.1* 9.6* 9.5* 10.9* 11.5*   HCT 29.3* 31.2* 31.0* 34.9* 36.8*    212 199 207 225       Basic Metabolic Panel:  Recent Labs   Lab 08/24/21  1203 08/24/21  0812 08/24/21  0502 08/23/21  2113 08/23/21  1709 08/23/21  1154 08/23/21  0534 08/22/21  0451 08/20/21  2245 08/20/21 2243 08/20/21 2243   NA  --   --  141  --   --   --  141 139 138  --  139   POTASSIUM  --   --  4.3  --   --   --  4.5 4.3 4.4  --  4.8   CHLORIDE  --   --  109*  --   --   --  109* 107 104  --  106   CO2   --   --  19*  --   --   --  18* 21* 23  --  21*   BUN  --   --  48*  --   --   --  47* 51* 39*  --  38*   CR  --   --  8.50*  --   --   --  7.76* 7.54* 5.32*  --  5.39*   GLC 79 103 103 132* 115 144* 107 110 117   < > 110   NISREEN  --   --  6.8*  --   --   --  7.2* 7.1* 8.6  --  8.5    < > = values in this interval not displayed.       INR  Recent Labs   Lab 08/21/21  1551 08/20/21 2245   INR 1.24* 1.11        Recent Labs   Lab Test 08/24/21  0502 08/23/21  0534 08/21/21  1551 08/20/21 2245   INR  --   --  1.24* 1.11   WBC 9.2 11.1*  --  17.6*   HGB 9.1* 9.6*  --  11.5*    212  --  225       Personally reviewed current labs

## 2021-08-24 NOTE — PRE-PROCEDURE
GENERAL PRE-PROCEDURE:   Procedure:  Tunneled dialysis catheter placement  Date/Time:  8/24/2021 12:00 PM    Written consent obtained?: Yes    Risks and benefits: Risks, benefits and alternatives were discussed    Consent given by:  Patient  Patient states understanding of procedure being performed: Yes    Patient's understanding of procedure matches consent: Yes    Procedure consent matches procedure scheduled: Yes    Expected level of sedation:  Moderate  Appropriately NPO:  Yes  Mallampati  :  Grade 2- soft palate, base of uvula, tonsillar pillars, and portion of posterior pharyngeal wall visible  Lungs:  Lungs clear with good breath sounds bilaterally  Heart:  Normal heart sounds and rate  History & Physical reviewed:  History and physical reviewed and no updates needed  Statement of review:  I have reviewed the lab findings, diagnostic data, medications, and the plan for sedation

## 2021-08-24 NOTE — PROCEDURES
[facility]    Procedure: TDC    Date/Time: 8/24/2021 1:42 PM  Performed by: Chay Alexandra  Authorized by: Chay Alexandra     UNIVERSAL PROTOCOL   Site Marked: NA  Prior Images Obtained and Reviewed:  Yes  Required items: Required blood products, implants, devices and special equipment available    Patient identity confirmed:  Verbally with patient, arm band, provided demographic data and hospital-assigned identification number  Patient was reevaluated immediately before administering moderate or deep sedation or anesthesia  Confirmation Checklist:  Patient's identity using two indicators, relevant allergies, procedure was appropriate and matched the consent or emergent situation and correct equipment/implants were available  Time out: Immediately prior to the procedure a time out was called    Universal Protocol: the Joint Commission Universal Protocol was followed    Preparation: Patient was prepped and draped in usual sterile fashion           ANESTHESIA    Anesthesia: Local infiltration  Local Anesthetic:  Lidocaine 1% without epinephrine      SEDATION    Patient Sedated: Yes    Sedation Type:  Moderate (conscious) sedation  Vital signs: Vital signs monitored during sedation    See dictated procedure note for full details.  Findings: RT internal jugular TDC    Specimens: none    Complications: None    Condition: Stable    PROCEDURE   Patient Tolerance:  Patient tolerated the procedure well with no immediate complications    Length of time physician/provider present for 1:1 monitoring during sedation: 15

## 2021-08-24 NOTE — PLAN OF CARE
Pt. Alert and oriented. Received PD overnight. The cycler was alarming frequently over the night. Blockage in line. Frequent reposition changes attempted. On call [facility] charge nurse updated. Bypassed X1 with 15 ml left when it wouldn't complete the drain. Pt. Complain of ABD pain with draining cycles. Prn Dilaudid helpful. Vital signs have been stable. TELE NSR. Pt. Will have CVC replaced today for HD. Continue to monitor.    Meagan Suarez RN    Problem: Adult Inpatient Plan of Care  Goal: Optimal Comfort and Wellbeing  Outcome: Improving     Problem: Infection Progression (Sepsis)  Goal: Absence of Infection Signs and Symptoms  Outcome: Improving     Problem: Nutrition Impaired (Sepsis/Septic Shock)  Goal: Optimal Nutrition Intake  Outcome: Improving     Problem: Pain Acute  Goal: Acceptable Pain Control and Functional Ability  Outcome: Improving

## 2021-08-24 NOTE — PROGRESS NOTES
Brief ID Follow-up Note    Chart reviewed. Patient at procedure during rounds. Cultures reviewed, remain in progress. CRP coming down. Wbc normal (no eosinophilia)    Continue vancomycin and pipercillin/tazobactam    Will follow    Shorty Luna MD  [facility]  Direct messaging: [facility] Paging  On-Call ID provider: [phone], option: [phone]

## 2021-08-24 NOTE — CONSULTS
Diabetes Consult:    Consult ordered due to patient's statement about only giving Humalog if BG > 200.  Talked with patient and he states that is true and only gives the insulin if he is on Prednisone. He has a meter and checks his BG when on steroids.  A1C 5.7 8/21/21.    No steroids here, BG in range, no correction insulin needed.    Lilibeth Rivera RN, Certified Diabetes Care and     [address] [address]  [email or web address]   Office: [phone]  Pager: [phone]

## 2021-08-24 NOTE — PLAN OF CARE
Problem: Diabetes Comorbidity  Goal: Blood Glucose Level Within Targeted Range  Outcome: Improving:  and 79. Pt has been NPO until 1400.    Problem: Hypertension Acute  Goal: Blood Pressure Within Desired Range  Outcome: Improving: /55 and 191/85. Pt refused all morning medications including BP meds. After pt saw how high BP was he wanted the medication. Med given and then pt left for IR procedure. Returned at 1410 BP at that time 119/65.    Problem: Pain Acute  Goal: Acceptable Pain Control and Functional Ability  Outcome: Improving: Pt had a slight headache. PRN Tylenol given at 1200 with good relief.    HR: NSR  SBA for transfers

## 2021-08-24 NOTE — PROGRESS NOTES
Pt returned to room 324. Pt verbalized report to Staff RN, Michelle MAYO. No further questions after report. Pt resting comfortably

## 2021-08-24 NOTE — PROGRESS NOTES
"CLINICAL NUTRITION SERVICES - ASSESSMENT NOTE     Nutrition Prescription    RECOMMENDATIONS FOR MDs/PROVIDERS TO ORDER:  Renal multivitamin as patient is meeting < 75% estimated nutrition needs    Malnutrition Status:    Unable to assess as patient unavailable    Recommendations already ordered by Registered Dietitian (RD):  none    Future/Additional Recommendations:  Diet advance to Dialysis diet when ready per physician     REASON FOR ASSESSMENT  Dylon Trevizo is a/an 70 year old male assessed by the dietitian for Admission Nutrition Risk Screen for positive    Patient presents with sepsis, ESRD on PD, HLD, HN, confusion, GERD, asthma, CAD, h/o DM, anemia    NUTRITION HISTORY  Attempted to visit patient x 2 8/23/21. Patient sleeping and on phone. Will reattempt at f/u.    CURRENT NUTRITION ORDERS  Diet: NPO  Intake/Tolerance: Patient was eating 100% of meals per nursing records prior to NPO. Patient received an average of 1062kcals and 46g protein daily.     LABS  Labs reviewed: BUN 48, Cr 8.50, GFR 6, alb 2.3, CRP 16.7, hgb 9.1, hematocrit 29.3, rbc 3.07    MEDICATIONS  Medications reviewed: lasix, zosyn     ANTHROPOMETRICS  Height: 177.8 cm (5' 10\")  Most Recent Weight: 101.9 kg (224 lb 11.2 oz)    IBW: 73 kg  BMI: Obesity Grade I BMI 30-34.9  Weight History:   Wt Readings from Last 30 Encounters:   08/24/21 101.9 kg (224 lb 11.2 oz)   08/18/2021 99.8 kg (220 lb)       05/05/2021 100.5 kg (221 lb 9.6 oz)   10/16/19 105.2 kg (232 lb)   09/27/19 103.4 kg (228 lb)   08/28/19 104.3 kg (230 lb)   08/22/19 102.1 kg (225 lb)       Dosing Weight: 80.2 kg adjusted     ASSESSED NUTRITION NEEDS  Estimated Energy Needs: 3604-7736 kcals/day (25 - 30 kcals/kg)  Justification: Obese  Estimated Protein Needs:  grams protein/day (1.2 - 1.3 grams of pro/kg)  Justification: Dialysis  Estimated Fluid Needs:1000ml + Output daily (or 1000-2000ml daily w/ PD Only)   Justification: HD    PHYSICAL FINDINGS  See malnutrition " section below.  No abnormal nutrition-related physical findings observed.     MALNUTRITION  % Intake: Unable to assess  % Weight Loss: None noted  Subcutaneous Fat Loss: Unable to assess  Muscle Loss: Unable to assess  Fluid Accumulation/Edema: Mild  Malnutrition Diagnosis: Unable to determine due to patient unavailable    NUTRITION DIAGNOSIS  Altered nutrition-related laboratory values related to ESRD and sepsis as evidenced by  BUN 48, Cr 8.50, GFR 6, alb 2.3, CRP 16.7    INTERVENTIONS  Implementation  Nutrition Education: Unable to complete due to patient unavailable   Collaboration with other providers     Goals  Encourage inake  Patient to consume % of nutritionally adequate meals three times per day, or the equivalent with supplements/snacks.  Total avg nutritional intake to meet a minimum of 25 kcal/kg and 1.2 g PRO/kg daily (per dosing wt 80.2 kg).     Monitoring/Evaluation  Progress toward goals will be monitored and evaluated per protocol. Diet advance, wt, labs

## 2021-08-24 NOTE — PROGRESS NOTES
Interventional Radiology - Pre-Procedure Note:  8/24/2021    Procedure Requested: Tunneled dialysis catheter placement  Requested by: Dr. Chris Westbrook    Brief HPI: Dylon Trevizo is a 70 year old male with history of DMII, HTN, IVY, HD dependent ESRD via tunneled dialysis catheter who was admitted 8/20/21 with fever and AMS. Also with PD catheter in place, removed then replaced 7/29. Tunneled dialysis catheter removed 8/23 due to suspected infection. Tunneled dialysis catheter replacement requested for today. Discussed with Dr. Westbrook - no concern with placing tunneled dialysis catheter as negative cultures.   Patient will require PD catheter repositioning which we are unable to accommodate at [facility] and will need to be done at [facility] which can be arranged as an outpatient.     NPO: MN  ANTICOAGULANTS: Lovenox, S/C Heparin   ANTIBIOTICS: Receiving IV Zosyn, Vanco     ALLERGIES  Allergies   Allergen Reactions     Ace Inhibitors Unknown and Other (See Comments)     Hyperkalemia  Hyperkalemia  hyperkalemia       Losartan Unknown and Other (See Comments)     hyperkalemia  Hyperkalemia  hyperkalemia       Tramadol Other (See Comments) and Shortness Of Breath     SOB  Dyspnea       Aminophylline Nausea and Vomiting and Unknown     Nausea/vomiting       Folic Acid-Vit B6-Vit B12 Unknown     Unknown  Other reaction(s): Unknown  Unknown       Sulfamethoxazole-Trimethoprim Nausea and Vomiting and Nausea     Nausea/vomiting       Theophylline Nausea and Vomiting     Nausea/vomiting       Vitamin B12 Unknown     unknown  Other reaction(s): *Unknown       Ceftriaxone Rash     Came in with strep, rash after eeceiving rocephin, localized to his back only-c/o of itching  Came in with strep, rash after eeceiving rocephin, localized to his back only-c/o of itching  Rash/itching  Came in with strep, rash after eeceiving rocephin, localized to his back only-c/o of itching  Rash/itching  Came in with strep, rash after  "eeceiving rocephin, localized to his back only-c/o of itching  Tolerated cefazolin 10/2018  Came in with strep, rash after eeceiving rocephin, localized to his back only-c/o of itching       Clavulanic Acid Nausea and Rash     aka  AUGMENTIN  (NAUSEA)  aka  AUGMENTIN  (NAUSEA)  Unknown  aka  AUGMENTIN  (NAUSEA)  Unknown  aka  AUGMENTIN  (NAUSEA)  Other reaction(s): Nausea Only  aka  AUGMENTIN  (NAUSEA)  aka  AUGMENTIN  (NAUSEA)       Hydralazine Muscle Pain (Myalgia) and Rash     Muscle aches, severe cramps, constant, felt like RA in hands  Muscle aches; severe cramps, felt like RA in hands, constant   myalgia           LABS:  INR   Date Value Ref Range Status   08/21/2021 1.24 (H) 0.90 - 1.15 Final     Comment:     Effective 7/11/2021, the reference range for this assay has changed.   10/16/2019 0.99 0.86 - 1.14 Final      Hemoglobin   Date Value Ref Range Status   08/24/2021 9.1 (L) 13.3 - 17.7 g/dL Final   10/16/2019 9.5 (L) 13.3 - 17.7 g/dL Final   ]  Platelet Count   Date Value Ref Range Status   08/24/2021 222 150 - 450 10e3/uL Final   10/16/2019 258 150 - 450 10e9/L Final     Creatinine   Date Value Ref Range Status   08/24/2021 8.50 (HH) 0.70 - 1.30 mg/dL Final   10/16/2019 6.31 (H) 0.66 - 1.25 mg/dL Final     Potassium   Date Value Ref Range Status   08/24/2021 4.3 3.5 - 5.0 mmol/L Final   10/16/2019 5.5 (H) 3.4 - 5.3 mmol/L Final         EXAM:  /55 (BP Location: Right arm)   Pulse 61   Temp 98.4  F (36.9  C) (Oral)   Resp 18   Ht 1.778 m (5' 10\")   Wt 101.9 kg (224 lb 11.2 oz)   SpO2 92%   BMI 32.24 kg/m    General:  Stable.  In no acute distress.    Neuro:  A&O x 3.   Resp:  Lungs clear to auscultation bilaterally.  Cardio:  S1S2 and reg  Skin:  Without excoriations, ecchymosis, erythema, lesions or open sores on bilateral chest, neck. Dressing on right chest in place, no drainage    Pre-Sedation Assessment:  Mallampati Airway Classification:  II - Faucial pillars and soft palate may be seen, " but uvula is masked by the base of the tongue  Previous reaction to anesthesia/sedation:  No  Sedation plan based on assessment: Moderate (conscious) sedation  ASA Classification: Class 3 - SEVERE SYSTEMIC DISEASE, DEFINITE FUNCTIONAL LIMITATIONS.   Code Status: Full Code intra procedure, per discussion with patient.     ASSESSMENT/PLAN:   70 year old male with ESRD in need of HD access    Tunneled dialysis catheter placement with sedation     Procedure, risks/benefits, details, alternatives, and sedation reviewed with patient/family and he verbalized understanding. All questions answered. OK to proceed with above radiology procedure.     Michaela Shukla, CNP  Interventional Radiology

## 2021-08-24 NOTE — PROGRESS NOTES
"[facility] PROGRESS NOTE    ADMIT DATE: 8/20/2021     FACILITY: [facility]    PCP: Mc Gomez, [phone]    ASSESSMENT AND PLAN:   Patient is a 70 year old male with  has a past medical history of Anxiety and depression, Asthma, severe persistent, poorly-controlled (1958), ESRD (end stage renal disease) on dialysis (H), HLD (hyperlipidemia), Hypertension, IVY (obstructive sleep apnea), and Type 2 diabetes mellitus (H) (09/01/1980). comes in for AMS and fever.    Principal Problem:    Sepsis without acute organ dysfunction, due to unspecified organism (H)  Active Problems:    Sinus tachycardia    End-stage renal disease on hemodialysis (H)    Fever, unspecified fever cause    Leukocytosis, unspecified type    Suspect OPAL  -CT chest shows: \"Compared to 11/21/2019, progression in chronic bilateral reticulonodular interstitial infiltrates likely on the basis of atypical small airway infection such as OPAL.\"  -ID consult---> \"suggesting chronic infection such as OPAL. Not related to current problems. He lacks productive cough and otherwise pulmonary symptoms are related to fluid status. Doubt this is a significant findings at this point. Can test sputum if he is able but would not pursue bronchoscopy at this point. Can monitor on serial CT over months to years. TB not suspected, no need for respiratory isolation. \"     Chest pain  -complains of chest pain at dialysis site  -EKG shows sinus tach with HR of 113 bpm. EKG had much artifact. Troponin x 3 negative .  -tylenol PRN     Peritonitis/septic  -patient was septic on admission with fever of 104.7 F and leukocytosis of 17.6  -Patient has had an infection to peritoneal dialysis port recently and has been on antibiotics. PD cath removed, then replaced 7/29. Patient on temporary IHD via CVC.  Plan for PD tonight, then to remove on 8/24, culture the tip, and start hemodialysis, per today's discussion with nephrology  -c/w IV zosyn and IV vanco for " now  -Nephrology, ID following     ESRD  -MWF dialysis.  Plan for HD as above.  -c/w home NaHCO3 and renvela      Encephalopathy, present on admission, likely due to sepsis.  Resolved now.  -EKG shows sinus tach with HR of 113 bpm. EKG had much artifact. Troponin x 3 negative .   -ABG was unremarkable   -CT brain came back unremarkable  -vitamin B12, ammonia, and TSH WNL,   -urine tox + for MJ only     Fall  -had fall in ED with his confusion. Did not hit head.  -CT head unremarkable  -fall precautions   -PT/OT-->recommend home with assist     GERD  -c/w home protonix     Hx of depression  -c/w home zyprexa, haldol, trazodone, and zoloft    Hx of asthma  -was recently being treated for asthma exacerbation with prednisone  -c/w home symbicort, home duoneb and home albuterol       Hx of chronic pain  -restart home diluadid since confusion has resolved     HTN/CAD  -last echo showed LVEF of 55-60%  -BP and HR stable  -c/w home lasix and coreg with hold parameters     HLD  -c/w home lipitor        Hx of DM  -from current med list, on sliding scale insulin only at home  -HgbA1c 5.7 8/21/2021  -c/w low resistance sliding scale insulin for now  -c/w accuchecks and adjust insulin regimen PRN     Anemia  -Hgb at baseline which is 9-10  -monitor daily      Lightheadedness  -orthostatics pending  -TSH WNL  -PT/OT-->recommend home with assist  -denies any lightheadedness today    Neck pain  -ordered one time dose of flexeril  -consider starting patient on scheduled flexeril if needed      FEN/GI: low carb, low fat, dialysis diet  DVT proph: heparin   Code status: Full Code    Discharge barriers:  -cultures pending, IV antibxs, nephrology and ID following, possible HD catheter removal?   -ADOD: 2-3 days     SUBJECTIVE:    Patient is new to me.  Chart reviewed.  Patient was seen and examined.  Nephrology visiting as well.  Patient comes with abdominal pain.  Initial nephrology recommended withholding peritoneal dialysis and  remove PD catheter today, patient would like to have another round overnight, prior to start hemodialysis tomorrow.    Afebrile.  Vital stable.  No chest pain, cough, nausea.    ROS:  12 Points review of systems reviewed and is negative except for what has already been mentioned above    OBJECTIVE:  Patient Vitals for the past 24 hrs:   BP Temp Temp src Pulse Resp SpO2 Weight   08/23/21 1829 -- -- -- -- -- 94 % --   08/23/21 1547 (!) 170/78 -- Axillary 71 16 95 % --   08/23/21 1520 (!) 156/76 99  F (37.2  C) Axillary 69 16 95 % 102.5 kg (226 lb 1 oz)   08/23/21 1141 (!) 143/69 99.8  F (37.7  C) Oral 78 20 96 % --   08/23/21 1004 (!) 157/74 -- -- -- 20 -- --   08/23/21 0943 -- -- -- -- -- -- 102.5 kg (226 lb 1 oz)   08/23/21 0710 (!) 142/70 98.5  F (36.9  C) Oral 67 16 95 % --   08/23/21 0515 -- -- -- -- -- 94 % --   08/23/21 0331 (!) 140/63 98.4  F (36.9  C) Oral 64 16 94 % --   08/22/21 2302 128/60 98.4  F (36.9  C) Oral 72 18 94 % --   08/22/21 2100 (!) 169/81 -- -- 80 -- 95 % --   08/22/21 2055 -- 99.5  F (37.5  C) -- -- -- -- --   08/22/21 2040 (!) 158/77 (!) 100.6  F (38.1  C) Oral -- 22 -- --   08/22/21 1938 135/64 99.6  F (37.6  C) Oral 75 22 95 % --     Intake/Output Summary (Last 24 hours) at 8/22/2021 0730  Last data filed at 8/22/2021 0630  Gross per 24 hour   Intake 1370 ml   Output 400 ml   Net 970 ml     GENRL: Alert and oriented to person, place, and time. Satting at 94% on 2 L O2 via NC.  HEENT: NC/AT                 Neck- supple                 Sclera- anicteric                 Mucous membrane- moist and pink  CHEST: Clear to auscultation bilaterally. CVC on chest.   HEART: S1S2 regular. No murmurs, rubs or gallops  ABDMN: Soft. Non-tender. No guarding or rigidity. Bowel sounds- active  EXTRM:  No pedal edema.   NEURO: No involuntary movements  INTGM: please see nursing assessment for full skin assessment  PULSES: 2+ and symmetric all extremities     DIAGNOSTIC DATA:          Recent Results (from  the past 24 hour(s))   Lactic Acid STAT    Collection Time: 08/22/21  7:38 PM   Result Value Ref Range    Lactic Acid 0.6 (L) 0.7 - 2.0 mmol/L   Glucose by meter    Collection Time: 08/22/21  8:46 PM   Result Value Ref Range    GLUCOSE BY METER POCT 106 70 - 125 mg/dL   Glucose by meter    Collection Time: 08/23/21 12:08 AM   Result Value Ref Range    GLUCOSE BY METER POCT 111 70 - 125 mg/dL   Fibrinogen activity    Collection Time: 08/23/21  5:34 AM   Result Value Ref Range    Fibrinogen Activity 851 (H) 170 - 490 mg/dL   CRP inflammation    Collection Time: 08/23/21  5:34 AM   Result Value Ref Range    CRP 19.7 (H) 0.0-<0.8 mg/dL   Comprehensive metabolic panel    Collection Time: 08/23/21  5:34 AM   Result Value Ref Range    Sodium 141 136 - 145 mmol/L    Potassium 4.5 3.5 - 5.0 mmol/L    Chloride 109 (H) 98 - 107 mmol/L    Carbon Dioxide (CO2) 18 (L) 22 - 31 mmol/L    Anion Gap 14 5 - 18 mmol/L    Urea Nitrogen 47 (H) 8 - 22 mg/dL    Creatinine 7.76 (HH) 0.70 - 1.30 mg/dL    Calcium 7.2 (L) 8.5 - 10.5 mg/dL    Glucose 107 70 - 125 mg/dL    Alkaline Phosphatase 65 45 - 120 U/L    AST 10 0 - 40 U/L    ALT <9 0 - 45 U/L    Protein Total 6.1 6.0 - 8.0 g/dL    Albumin 2.4 (L) 3.5 - 5.0 g/dL    Bilirubin Total 0.5 0.0 - 1.0 mg/dL    GFR Estimate 6 (L) >60 mL/min/1.73m2   CBC with platelets and differential    Collection Time: 08/23/21  5:34 AM   Result Value Ref Range    WBC Count 11.1 (H) 4.0 - 11.0 10e3/uL    RBC Count 3.23 (L) 4.40 - 5.90 10e6/uL    Hemoglobin 9.6 (L) 13.3 - 17.7 g/dL    Hematocrit 31.2 (L) 40.0 - 53.0 %    MCV 97 78 - 100 fL    MCH 29.7 26.5 - 33.0 pg    MCHC 30.8 (L) 31.5 - 36.5 g/dL    RDW 14.6 10.0 - 15.0 %    Platelet Count 212 150 - 450 10e3/uL    % Neutrophils 70 %    % Lymphocytes 16 %    % Monocytes 6 %    % Eosinophils 6 %    % Basophils 1 %    % Immature Granulocytes 1 %    NRBCs per 100 WBC 0 <1 /100    Absolute Neutrophils 7.9 1.6 - 8.3 10e3/uL    Absolute Lymphocytes 1.7 0.8 - 5.3  10e3/uL    Absolute Monocytes 0.7 0.0 - 1.3 10e3/uL    Absolute Eosinophils 0.7 0.0 - 0.7 10e3/uL    Absolute Basophils 0.1 0.0 - 0.2 10e3/uL    Absolute Immature Granulocytes 0.1 (H) <=0.0 10e3/uL    Absolute NRBCs 0.0 10e3/uL   Glucose by meter    Collection Time: 08/23/21  8:37 AM   Result Value Ref Range    GLUCOSE BY METER POCT 91 70 - 125 mg/dL   Glucose by meter    Collection Time: 08/23/21 11:54 AM   Result Value Ref Range    GLUCOSE BY METER POCT 144 (H) 70 - 125 mg/dL   Extra Purple Top Tube    Collection Time: 08/23/21  3:55 PM   Result Value Ref Range    Hold Specimen JIC    Glucose by meter    Collection Time: 08/23/21  5:09 PM   Result Value Ref Range    GLUCOSE BY METER POCT 115 70 - 125 mg/dL        Results for orders placed or performed during the hospital encounter of 08/20/21   XR Chest Port 1 View    Impression    IMPRESSION: Tunneled right internal jugular dialysis catheter terminates in the SVC. Poststernotomy. Normal heart size and pulmonary vascularity. Mild atelectasis or scarring of the lung bases. Lungs otherwise clear. No pleural fluid or pneumothorax.   CT Chest Pulmonary Embolism w Contrast    Impression    IMPRESSION:  1.  No pulmonary embolus.  2.  Compared to 11/21/2019, progression in chronic bilateral reticulonodular interstitial infiltrates likely on the basis of atypical small airway infection such as OPAL.  3.  No acute process in the abdomen or pelvis.   Abd/pelvis CT,  IV  contrast only TRAUMA / AAA    Impression    IMPRESSION:  1.  No pulmonary embolus.  2.  Compared to 11/21/2019, progression in chronic bilateral reticulonodular interstitial infiltrates likely on the basis of atypical small airway infection such as OPAL.  3.  No acute process in the abdomen or pelvis.   CT Head w/o Contrast    Impression    IMPRESSION:  1.  No CT evidence for acute intracranial process.    2.  Brain atrophy and presumed chronic microvascular ischemic changes as above.    3.  Study performed  after administration of intravenous contrast material. While this slightly limits evaluation for intracranial hemorrhage, no such intracranial hemorrhage is identified.        All lab studies reviewed personally     Radiology Results: imaging impressions reviewed

## 2021-08-24 NOTE — PROGRESS NOTES
"[facility]    PROGRESS NOTE - Hospitalist Service    Assessment and Plan    Principal Problem:    Sepsis without acute organ dysfunction, due to unspecified organism (H)  Active Problems:    Sinus tachycardia    End-stage renal disease on hemodialysis (H)    Fever, unspecified fever cause    Leukocytosis, unspecified type    Dylon ADNNIE Trevizo is a 70 year old old male with h/o Anxiety and depression, Asthma, severe persistent, poorly-controlled (1957), ESRD (end stage renal disease) on dialysis (H), HLD (hyperlipidemia), Hypertension, IVY (obstructive sleep apnea), and Type 2 diabetes mellitus (H) (09/01/1980). comes in for AMS and fever.      Peritonitis/septic,  was septic on admission with fever of 104.7 F and leukocytosis of 17.6  -Patient has had an infection to peritoneal dialysis port recently and has been on antibiotics. PD cath removed, then replaced 7/29. Patient on temporary IHD via CVC.    - tunneled catheter placed today, previous removed 8/23   -c/w IV zosyn and IV vanco for now  -Nephrology, ID following  - PD repositioning needed but no longer can perform under IR at [facility],   - nephrology consulted surgery for this, may need this done as outpatient     Suspect OPAL  -CT chest shows: \"Compared to 11/21/2019, progression in chronic bilateral reticulonodular interstitial infiltrates likely on the basis of atypical small airway infection such as OPAL.\"  -ID consult---> \"suggesting chronic infection such as OPAL. Not related to current problems. He lacks productive cough and otherwise pulmonary symptoms are related to fluid status. Doubt this is a significant findings at this point. Can test sputum if he is able but would not pursue bronchoscopy at this point. Can monitor on serial CT over months to years. TB not suspected, no need for respiratory isolation. \"  - afb sputum pending      Chest pain  -complains of chest pain at dialysis site  -EKG shows sinus tach with HR of 113 " bpm. EKG had much artifact. Troponin x 3 negative .  -tylenol PRN     ESRD  -MWF dialysis.  Plan for HD as above.  -c/w home NaHCO3 and renvela      Encephalopathy, present on admission, likely due to sepsis.  Resolved now.  -EKG shows sinus tach with HR of 113 bpm. EKG had much artifact. Troponin x 3 negative .   -ABG was unremarkable   -CT brain came back unremarkable  -vitamin B12, ammonia, and TSH WNL,   -urine tox + for MJ only  - treating infection      Fall  -had fall in ED with his confusion. Did not hit head.  -CT head unremarkable  -fall precautions   -PT/OT-->recommend home with assist     GERD  -c/w home protonix     Hx of depression  -c/w home zyprexa, haldol, trazodone, and zoloft     Hx of asthma  -was recently being treated for asthma exacerbation with prednisone  -c/w home symbicort, home duoneb and home albuterol      Hx of chronic pain  - home diluadid since confusion has resolved     HTN/CAD  -last echo showed LVEF of 55-60%  -BP and HR stable  -c/w home lasix and coreg with hold parameters     HLD  -c/w home lipitor     Hx of DM  -from current med list, on sliding scale insulin only at home  -HgbA1c 5.7 8/21/2021  -c/w low resistance sliding scale insulin for now  -c/w accuchecks and adjust insulin regimen PRN     Anemia  -Hgb at baseline which is 9-10  -monitor daily      Lightheadedness  -orthostatics negative  -TSH WNL  -PT/OT-->recommend home with assist  -denies any lightheadedness today     Neck pain  -ordered one time dose of flexeril  -consider starting patient on scheduled flexeril if needed      VTE prophylaxis:  Heparin SQ  DIET: Orders Placed This Encounter      Combination Diet Dialysis Diet; Consistent Carb 60 Grams CHO per Meal Diet; Low Fat Diet    Drains/Lines: right internal jugular, PD  Weight bearing status: WBAt  Disposition/Barriers to discharge: pending further abx regiment and renal recs    Code Status: Full Code    Subjective:  Patient is complaining of not eating all  day and requests a meal prior to starting dialysis     PHYSICAL EXAM  Vitals:    08/23/21 0943 08/23/21 1520 08/24/21 0700   Weight: 102.5 kg (226 lb 1 oz) 102.5 kg (226 lb 1 oz) 101.9 kg (224 lb 11.2 oz)     B/P:112/55 T:98.4 P:61 R:18     Intake/Output Summary (Last 24 hours) at 8/24/2021 0828  Last data filed at 8/24/2021 0554  Gross per 24 hour   Intake 720 ml   Output 540 ml   Net 180 ml      Body mass index is 32.24 kg/m .  Physical Exam  Constitutional:       General: He is not in acute distress.     Appearance: Normal appearance. He is ill-appearing. He is not toxic-appearing.   HENT:      Head: Normocephalic.   Cardiovascular:      Rate and Rhythm: Normal rate and regular rhythm.      Pulses: Normal pulses.   Pulmonary:      Effort: Pulmonary effort is normal.      Breath sounds: Rales present.   Abdominal:      General: Bowel sounds are normal.      Palpations: Abdomen is soft.      Comments: PD catheter tender     Musculoskeletal:         General: Normal range of motion.   Skin:     General: Skin is warm.   Neurological:      General: No focal deficit present.      Mental Status: He is alert and oriented to person, place, and time. Mental status is at baseline.               PERTINENT LABS/IMAGING:  Results for orders placed or performed during the hospital encounter of 08/20/21   XR Chest Port 1 View    Impression    IMPRESSION: Tunneled right internal jugular dialysis catheter terminates in the SVC. Poststernotomy. Normal heart size and pulmonary vascularity. Mild atelectasis or scarring of the lung bases. Lungs otherwise clear. No pleural fluid or pneumothorax.   CT Chest Pulmonary Embolism w Contrast    Impression    IMPRESSION:  1.  No pulmonary embolus.  2.  Compared to 11/21/2019, progression in chronic bilateral reticulonodular interstitial infiltrates likely on the basis of atypical small airway infection such as OPAL.  3.  No acute process in the abdomen or pelvis.   Abd/pelvis CT,  IV  contrast  only TRAUMA / AAA    Impression    IMPRESSION:  1.  No pulmonary embolus.  2.  Compared to 11/21/2019, progression in chronic bilateral reticulonodular interstitial infiltrates likely on the basis of atypical small airway infection such as OPAL.  3.  No acute process in the abdomen or pelvis.   CT Head w/o Contrast    Impression    IMPRESSION:  1.  No CT evidence for acute intracranial process.    2.  Brain atrophy and presumed chronic microvascular ischemic changes as above.    3.  Study performed after administration of intravenous contrast material. While this slightly limits evaluation for intracranial hemorrhage, no such intracranial hemorrhage is identified.     IR CVC Tunnel Removal Right    Impression    IMPRESSION:    1. Successful tunneled catheter removal.    CPT:  70099    The CPT codes are for physician use only.       Most Recent 3 CBC's:Recent Labs   Lab Test 08/24/21  0502 08/23/21  0534 08/22/21  0451   WBC 9.2 11.1* 13.4*   HGB 9.1* 9.6* 9.5*   MCV 95 97 96    212 199     Most Recent 3 BMP's:Recent Labs   Lab Test 08/24/21  1203 08/24/21  0812 08/24/21  0502 08/23/21  0534 08/22/21  0451   NA  --   --  141 141 139   POTASSIUM  --   --  4.3 4.5 4.3   CHLORIDE  --   --  109* 109* 107   CO2  --   --  19* 18* 21*   BUN  --   --  48* 47* 51*   CR  --   --  8.50* 7.76* 7.54*   ANIONGAP  --   --  13 14 11   NISREEN  --   --  6.8* 7.2* 7.1*   GLC 79 103 103 107 110     Most Recent 2 LFT's:Recent Labs   Lab Test 08/24/21  0502 08/23/21  0534   AST 10 10   ALT <9 <9   ALKPHOS 75 65   BILITOTAL 0.4 0.5     Most Recent 6 Bacteria Isolates From Any Culture (See EPIC Reports for Culture Details):No lab results found.  Gayathri Hurley MD  [facility] Medicine Service  [phone]

## 2021-08-25 ENCOUNTER — APPOINTMENT (OUTPATIENT)
Dept: OCCUPATIONAL THERAPY | Facility: HOSPITAL | Age: 71
DRG: 907 | End: 2021-08-25
Payer: MEDICARE

## 2021-08-25 LAB
ALBUMIN SERPL-MCNC: 2.4 G/DL (ref 3.5–5)
ALP SERPL-CCNC: 89 U/L (ref 45–120)
ALT SERPL W P-5'-P-CCNC: <9 U/L (ref 0–45)
ANION GAP SERPL CALCULATED.3IONS-SCNC: 15 MMOL/L (ref 5–18)
AST SERPL W P-5'-P-CCNC: 13 U/L (ref 0–40)
BILIRUB SERPL-MCNC: 0.4 MG/DL (ref 0–1)
BUN SERPL-MCNC: 29 MG/DL (ref 8–22)
C REACTIVE PROTEIN LHE: 16.4 MG/DL (ref 0–0.8)
CALCIUM SERPL-MCNC: 7.3 MG/DL (ref 8.5–10.5)
CHLORIDE BLD-SCNC: 103 MMOL/L (ref 98–107)
CO2 SERPL-SCNC: 20 MMOL/L (ref 22–31)
CREAT SERPL-MCNC: 5.98 MG/DL (ref 0.7–1.3)
ERYTHROCYTE [DISTWIDTH] IN BLOOD BY AUTOMATED COUNT: 14.1 % (ref 10–15)
FIBRINOGEN PPP-MCNC: 880 MG/DL (ref 170–490)
GFR SERPL CREATININE-BSD FRML MDRD: 9 ML/MIN/1.73M2
GLUCOSE BLD-MCNC: 81 MG/DL (ref 70–125)
GLUCOSE BLDC GLUCOMTR-MCNC: 104 MG/DL (ref 70–125)
GLUCOSE BLDC GLUCOMTR-MCNC: 152 MG/DL (ref 70–125)
GLUCOSE BLDC GLUCOMTR-MCNC: 169 MG/DL (ref 70–125)
GLUCOSE BLDC GLUCOMTR-MCNC: 87 MG/DL (ref 70–125)
HBV SURFACE AG SERPL QL IA: NONREACTIVE
HCT VFR BLD AUTO: 32.3 % (ref 40–53)
HGB BLD-MCNC: 9.9 G/DL (ref 13.3–17.7)
IGE SERPL-ACNC: 1485 KU/L (ref 0–114)
MCH RBC QN AUTO: 29.3 PG (ref 26.5–33)
MCHC RBC AUTO-ENTMCNC: 30.7 G/DL (ref 31.5–36.5)
MCV RBC AUTO: 96 FL (ref 78–100)
PLATELET # BLD AUTO: 163 10E3/UL (ref 150–450)
POTASSIUM BLD-SCNC: 3.9 MMOL/L (ref 3.5–5)
PROT SERPL-MCNC: 6.4 G/DL (ref 6–8)
RBC # BLD AUTO: 3.38 10E6/UL (ref 4.4–5.9)
SODIUM SERPL-SCNC: 138 MMOL/L (ref 136–145)
VANCOMYCIN SERPL-MCNC: 22 MG/L
WBC # BLD AUTO: 7.4 10E3/UL (ref 4–11)

## 2021-08-25 PROCEDURE — 99232 SBSQ HOSP IP/OBS MODERATE 35: CPT | Performed by: INTERNAL MEDICINE

## 2021-08-25 PROCEDURE — 250N000013 HC RX MED GY IP 250 OP 250 PS 637: Performed by: FAMILY MEDICINE

## 2021-08-25 PROCEDURE — 999N000157 HC STATISTIC RCP TIME EA 10 MIN

## 2021-08-25 PROCEDURE — 80053 COMPREHEN METABOLIC PANEL: CPT | Performed by: FAMILY MEDICINE

## 2021-08-25 PROCEDURE — 36415 COLL VENOUS BLD VENIPUNCTURE: CPT | Performed by: FAMILY MEDICINE

## 2021-08-25 PROCEDURE — 250N000009 HC RX 250: Performed by: FAMILY MEDICINE

## 2021-08-25 PROCEDURE — 85027 COMPLETE CBC AUTOMATED: CPT | Performed by: FAMILY MEDICINE

## 2021-08-25 PROCEDURE — 94640 AIRWAY INHALATION TREATMENT: CPT

## 2021-08-25 PROCEDURE — 85384 FIBRINOGEN ACTIVITY: CPT | Performed by: FAMILY MEDICINE

## 2021-08-25 PROCEDURE — 210N000001 HC R&B IMCU HEART CARE

## 2021-08-25 PROCEDURE — 80202 ASSAY OF VANCOMYCIN: CPT | Performed by: INTERNAL MEDICINE

## 2021-08-25 PROCEDURE — 250N000013 HC RX MED GY IP 250 OP 250 PS 637

## 2021-08-25 PROCEDURE — 250N000013 HC RX MED GY IP 250 OP 250 PS 637: Performed by: NURSE PRACTITIONER

## 2021-08-25 PROCEDURE — 250N000011 HC RX IP 250 OP 636: Performed by: FAMILY MEDICINE

## 2021-08-25 PROCEDURE — 250N000012 HC RX MED GY IP 250 OP 636 PS 637: Performed by: FAMILY MEDICINE

## 2021-08-25 PROCEDURE — 86141 C-REACTIVE PROTEIN HS: CPT | Performed by: FAMILY MEDICINE

## 2021-08-25 RX ORDER — IPRATROPIUM BROMIDE AND ALBUTEROL SULFATE 2.5; .5 MG/3ML; MG/3ML
3 SOLUTION RESPIRATORY (INHALATION) 2 TIMES DAILY
Status: DISCONTINUED | OUTPATIENT
Start: 2021-08-26 | End: 2021-08-28 | Stop reason: HOSPADM

## 2021-08-25 RX ADMIN — HEPARIN SODIUM 5000 UNITS: 10000 INJECTION, SOLUTION INTRAVENOUS; SUBCUTANEOUS at 03:57

## 2021-08-25 RX ADMIN — INSULIN ASPART 1 UNITS: 100 INJECTION, SOLUTION INTRAVENOUS; SUBCUTANEOUS at 12:09

## 2021-08-25 RX ADMIN — SEVELAMER CARBONATE 1600 MG: 800 TABLET, FILM COATED ORAL at 12:11

## 2021-08-25 RX ADMIN — OLANZAPINE 2.5 MG: 2.5 TABLET ORAL at 20:32

## 2021-08-25 RX ADMIN — CARVEDILOL 25 MG: 12.5 TABLET, FILM COATED ORAL at 18:52

## 2021-08-25 RX ADMIN — IPRATROPIUM BROMIDE AND ALBUTEROL SULFATE 3 ML: .5; 3 SOLUTION RESPIRATORY (INHALATION) at 19:16

## 2021-08-25 RX ADMIN — HYDROMORPHONE HYDROCHLORIDE 2 MG: 2 TABLET ORAL at 19:00

## 2021-08-25 RX ADMIN — FUROSEMIDE 40 MG: 20 TABLET ORAL at 09:22

## 2021-08-25 RX ADMIN — CETIRIZINE HYDROCHLORIDE 10 MG: 10 TABLET ORAL at 09:21

## 2021-08-25 RX ADMIN — ALBUTEROL SULFATE 2 PUFF: 90 INHALANT RESPIRATORY (INHALATION) at 09:26

## 2021-08-25 RX ADMIN — TRAZODONE HYDROCHLORIDE 25 MG: 50 TABLET ORAL at 20:32

## 2021-08-25 RX ADMIN — HYDROMORPHONE HYDROCHLORIDE 2 MG: 2 TABLET ORAL at 03:56

## 2021-08-25 RX ADMIN — HEPARIN SODIUM 5000 UNITS: 10000 INJECTION, SOLUTION INTRAVENOUS; SUBCUTANEOUS at 20:31

## 2021-08-25 RX ADMIN — OLANZAPINE 2.5 MG: 2.5 TABLET ORAL at 09:21

## 2021-08-25 RX ADMIN — CARVEDILOL 25 MG: 12.5 TABLET, FILM COATED ORAL at 09:23

## 2021-08-25 RX ADMIN — SEVELAMER CARBONATE 1600 MG: 800 TABLET, FILM COATED ORAL at 18:52

## 2021-08-25 RX ADMIN — SEVELAMER CARBONATE 1600 MG: 800 TABLET, FILM COATED ORAL at 09:19

## 2021-08-25 RX ADMIN — TRAZODONE HYDROCHLORIDE 50 MG: 50 TABLET ORAL at 20:32

## 2021-08-25 RX ADMIN — PIPERACILLIN SODIUM AND TAZOBACTAM SODIUM 3.38 G: 3; .375 INJECTION, POWDER, LYOPHILIZED, FOR SOLUTION INTRAVENOUS at 22:48

## 2021-08-25 RX ADMIN — ATORVASTATIN CALCIUM 10 MG: 10 TABLET, FILM COATED ORAL at 09:22

## 2021-08-25 RX ADMIN — ACETAMINOPHEN 650 MG: 325 TABLET ORAL at 09:22

## 2021-08-25 RX ADMIN — PIPERACILLIN SODIUM AND TAZOBACTAM SODIUM 3.38 G: 3; .375 INJECTION, POWDER, LYOPHILIZED, FOR SOLUTION INTRAVENOUS at 09:25

## 2021-08-25 RX ADMIN — HEPARIN SODIUM 5000 UNITS: 10000 INJECTION, SOLUTION INTRAVENOUS; SUBCUTANEOUS at 12:10

## 2021-08-25 RX ADMIN — SERTRALINE HYDROCHLORIDE 50 MG: 50 TABLET ORAL at 09:23

## 2021-08-25 ASSESSMENT — MIFFLIN-ST. JEOR: SCORE: 1776.42

## 2021-08-25 NOTE — PROGRESS NOTES
Pt scheduled for laparoscopy with PD catheter repositioning Friday afternoon.  I will speak with pt tomorrow  Brennan Foley MD  8/25/2021  6:48 PM

## 2021-08-25 NOTE — PLAN OF CARE
Physical Therapy Discharge Summary    Reason for therapy discharge:    Patient not participating in PT x 3 days    Progress towards therapy goal(s). See goals on Care Plan in [facility] electronic health record for goal details.  Goals not met.  Barriers to achieving goals:   patient declined PT x 3 days.    Therapy recommendation(s):    No further therapy is recommended.

## 2021-08-25 NOTE — PROGRESS NOTES
Infectious Diseases Progress Note  [facility]    Date of visit: 8/22/2021      ASSESSMENT   1. Suspected sepsis -- high fever, tachycardia, leukocytosis. Based on pain at HD catheter site, suspect infected catheter. Wbc improving  2. Dialysis access difficulty -- reports one port at HD cath not working, has pain at site. PD catheter causes pain if used, not looking inflamed.   3. S/p PD catheter removal 7/13, new PD catheter placed 7/29. 7/13 culture with enterococcus. Didn't have antibiotic that would treat this, but previous site not inflamed and he does not have peritoneal signs. PD fluid sent for culture. Looks like low cell counts from fluid (28 wbc?)  4. Ceftriaxone allergy -- rash; sulfa and Augmentin intolerance.   5. ESRD, DM  6. Lung abnormalities seen on CT -- suggesting chronic infection such as OPAL. Reports frequent asthma attacks, requiring steroids. No current symptoms. Sputum collected and negative for afb smear; culture pending, TB not suspected. Work-up for ABPA pending    PLAN   Continue vancomycin and pip/tazo   Plan to continue antibiotics through surgery on Friday, then stop if no signs of persistent infection     Will follow peripherally, call with questions    Shorty Luna MD  [facility]  Direct messaging: [employer] Paging  On-Call ID provider: [phone], option: [phone]  ______________________________________________________________________      SUBJECTIVE / INTERVAL HISTORY:     No events. Very sleepy. Says that he got zyprexa this am which is normally given at night. No complaints this afternoon.    Plan for PD catheter revision on Friday    Review of Systems     No fevers, no rashes      Anti-infectives: pip/tazo 8/20-  Vancomycin 8/21-  Cultures: 8/20 blood pending  8/20 PD fluid culture pending -- gram stain with No PMNs, No organisms.   8/21 urine culture: pending  8/21 sputum afb negative   8/22 blood culture: pending    Imaging: reviewed images of  CT          Physical Exam:  Temp:  [97.4  F (36.3  C)-100  F (37.8  C)] 98.6  F (37  C)  Pulse:  [56-79] 68  Resp:  [16-22] 20  BP: (119-193)/(60-91) 122/60  SpO2:  [89 %-98 %] 90 %     GENERAL:  Lying in bed, no distress  EYES: No conjunctival injection  HEAD, EARS, NOSE, MOUTH, AND THROAT: Nontraumatic  RESPIRATORY: Clear breath sounds to auscultation bilaterally.   CARDIOVASCULAR: Regular rate and rhythm, normal S1 and S2, no murmurs, rubs, or gallops.  ABDOMEN: pd catheter in place  MUSCULOSKELETAL: No synovitis.  LYMPHATIC: No lymphadenopathy.  SKIN/HAIR/NAILS: No rashes, no signs of peripheral emboli.  NEUROLOGIC: drowsy, answering questions      Pertinent Labs:     Recent Labs   Lab 08/25/21 0457 08/24/21  0502 08/23/21  0534   WBC 7.4 9.2 11.1*   HGB 9.9* 9.1* 9.6*    222 212       Recent Labs   Lab 08/25/21  0457 08/24/21  0502 08/23/21  0534    141 141   CO2 20* 19* 18*   BUN 29* 48* 47*   ALBUMIN 2.4* 2.3* 2.4*   ALKPHOS 89 75 65   ALT <9 <9 <9   AST 13 10 10       Recent Labs   Lab 08/25/21  0457 08/24/21  0502 08/23/21  0534   CRP 16.4* 16.7* 19.7*           Imaging:     XR Chest Port 1 View    Result Date: 8/20/2021  EXAM: XR CHEST PORT 1 VIEW LOCATION: [facility] DATE/TIME: 8/20/2021 10:41 PM INDICATION: Fever COMPARISON: 08/18/2021.     IMPRESSION: Tunneled right internal jugular dialysis catheter terminates in the SVC. Poststernotomy. Normal heart size and pulmonary vascularity. Mild atelectasis or scarring of the lung bases. Lungs otherwise clear. No pleural fluid or pneumothorax.    Abd/pelvis CT,  IV  contrast only TRAUMA / AAA    Result Date: 8/21/2021  EXAM: CT ABDOMEN PELVIS W CONTRAST, CT CHEST PULMONARY EMBOLISM W CONTRAST LOCATION: [facility] DATE/TIME: 8/21/2021 2:20 AM INDICATION: Sepsis, fever, abdominal pain, tachycardia, altered mental status. COMPARISON: Chest radiograph 08/20/2021. CT abdomen and pelvis 10/09/2020.  Noncontrast CT chest 11/21/2019. TECHNIQUE: CT chest pulmonary angiogram and routine CT abdomen pelvis with IV contrast. Arterial phase through the chest and venous phase through the abdomen and pelvis. Multiplanar reformats and MIP reconstructions were performed. Dose reduction techniques were used. CONTRAST: isovue 370 100ml FINDINGS: ANGIOGRAM CHEST: No aortic aneurysm or dissection. No pulmonary embolus. LUNGS AND PLEURA: Compared to 11/21/2019, there has been progression in reticulonodular interstitial infiltrate which affects all pulmonary lobes. The findings are most pronounced in the upper lobes, right more than left. Chronic subpleural scarring in the right upper lobe. No pleural fluid or pneumothorax. MEDIASTINUM/AXILLAE: Poststernotomy. A few top normal sized mediastinal lymph nodes. CORONARY ARTERY CALCIFICATION: Severe. HEPATOBILIARY: Small right hepatic cyst. Normal gallbladder and bile ducts. PANCREAS: Normal. SPLEEN: Normal. ADRENAL GLANDS: Normal. KIDNEYS/BLADDER: Tiny benign left renal cysts. Normal right kidney and bladder. BOWEL: Normal. LYMPH NODES: Normal. VASCULATURE: Mild aortoiliac atherosclerosis. PELVIC ORGANS: Normal. MUSCULOSKELETAL: Peritoneal dialysis catheter enters the ventral pelvic wall and terminates in the intraperitoneal space of the central pelvis superior to the urinary bladder. Stable 3.2 cm calcified nodule in the soft tissues medial to the left glenohumeral joint has a benign appearance (image 26 of series 2). 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 Degenerative changes of the spine.     IMPRESSION: 1.  No pulmonary embolus. 2.  Compared to 11/21/2019, progression in chronic bilateral reticulonodular interstitial infiltrates likely on the basis of atypical small airway infection such as OPAL. 3.  No acute process in the abdomen or pelvis.    CT Chest Pulmonary Embolism w Contrast    Result Date: 8/21/2021  EXAM: CT ABDOMEN  PELVIS W CONTRAST, CT CHEST PULMONARY EMBOLISM W CONTRAST LOCATION: [facility] DATE/TIME: 8/21/2021 2:20 AM INDICATION: Sepsis, fever, abdominal pain, tachycardia, altered mental status. COMPARISON: Chest radiograph 08/20/2021. CT abdomen and pelvis 10/09/2020. Noncontrast CT chest 11/21/2019. TECHNIQUE: CT chest pulmonary angiogram and routine CT abdomen pelvis with IV contrast. Arterial phase through the chest and venous phase through the abdomen and pelvis. Multiplanar reformats and MIP reconstructions were performed. Dose reduction techniques were used. CONTRAST: isovue 370 100ml FINDINGS: ANGIOGRAM CHEST: No aortic aneurysm or dissection. No pulmonary embolus. LUNGS AND PLEURA: Compared to 11/21/2019, there has been progression in reticulonodular interstitial infiltrate which affects all pulmonary lobes. The findings are most pronounced in the upper lobes, right more than left. Chronic subpleural scarring in the right upper lobe. No pleural fluid or pneumothorax. MEDIASTINUM/AXILLAE: Poststernotomy. A few top normal sized mediastinal lymph nodes. CORONARY ARTERY CALCIFICATION: Severe. HEPATOBILIARY: Small right hepatic cyst. Normal gallbladder and bile ducts. PANCREAS: Normal. SPLEEN: Normal. ADRENAL GLANDS: Normal. KIDNEYS/BLADDER: Tiny benign left renal cysts. Normal right kidney and bladder. BOWEL: Normal. LYMPH NODES: Normal. VASCULATURE: Mild aortoiliac atherosclerosis. PELVIC ORGANS: Normal. MUSCULOSKELETAL: Peritoneal dialysis catheter enters the ventral pelvic wall and terminates in the intraperitoneal space of the central pelvis superior to the urinary bladder. Stable 3.2 cm calcified nodule in the soft tissues medial to the left glenohumeral joint has a benign appearance (image 26 of series 2). 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 Degenerative changes of the spine.     IMPRESSION: 1.  No pulmonary embolus. 2.  Compared to  11/21/2019, progression in chronic bilateral reticulonodular interstitial infiltrates likely on the basis of atypical small airway infection such as OPAL. 3.  No acute process in the abdomen or pelvis.    CT Head w/o Contrast    Result Date: 8/21/2021  EXAM: CT HEAD WITHOUT CONTRAST LOCATION: [facility] DATE/TIME: 08/21/2021, 12:54 PM INDICATION: Mental status change, unknown cause. COMPARISON: Head CT 09/30/2018 and brain MR 03/02/2018. TECHNIQUE: Routine CT Head without IV contrast. Multiplanar reformats. Dose reduction techniques were used. FINDINGS: INTRACRANIAL CONTENTS: This study was performed post administration of intravenous contrast material. This slightly limits evaluation for intracranial hemorrhage, although no such intracranial hemorrhage is identified. No abnormal enhancement. No intracranial hemorrhage, extra-axial collection, or mass effect.  No CT evidence of acute infarct. Moderate presumed chronic small vessel ischemic changes. Mild generalized volume loss. No hydrocephalus. VISUALIZED ORBITS/SINUSES/MASTOIDS: Prior bilateral cataract surgery. Visualized portions of the orbits are otherwise unremarkable. No paranasal sinus mucosal disease. No middle ear or mastoid effusion. BONES/SOFT TISSUES: No acute abnormality.     IMPRESSION: 1.  No CT evidence for acute intracranial process. 2.  Brain atrophy and presumed chronic microvascular ischemic changes as above. 3.  Study performed after administration of intravenous contrast material. While this slightly limits evaluation for intracranial hemorrhage, no such intracranial hemorrhage is identified.         Data reviewed today: I reviewed all medications, new labs and imaging results over the last 24 hours. I personally reviewed no images or EKG's today.  The patient's care was discussed with the neprhology Consultant.

## 2021-08-25 NOTE — PROGRESS NOTES
"[facility]    PROGRESS NOTE - Hospitalist Service    Assessment and Plan    Principal Problem:    Sepsis without acute organ dysfunction, due to unspecified organism (H)  Active Problems:    Sinus tachycardia    End-stage renal disease on hemodialysis (H)    Fever, unspecified fever cause    Leukocytosis, unspecified type    Dylon DANNIE Trevizo is a 70 year old old male with h/o Anxiety and depression, Asthma, severe persistent, poorly-controlled (1958), ESRD (end stage renal disease) on dialysis (H), HLD (hyperlipidemia), Hypertension, IVY (obstructive sleep apnea), and Type 2 diabetes mellitus (H) (09/01/1980). comes in for AMS and fever.      Peritonitis/septic,  was septic on admission with fever of 104.7 F and leukocytosis of 17.6  -Patient has had an infection to peritoneal dialysis port recently and has been on antibiotics. PD cath removed, then replaced 7/29. Patient on temporary HD via CVC.    PD on hold  - tunneled catheter placed today, previous removed 8/23   -c/w IV zosyn and IV vanco for now  -Nephrology, ID following  - PD repositioning needed but no longer can perform under IR at [facility], this is scheduled for tomorrow.  ED code be resumed as soon as a week after surgery.    Suspect OPAL  -CT chest shows: \"Compared to 11/21/2019, progression in chronic bilateral reticulonodular interstitial infiltrates likely on the basis of atypical small airway infection such as OPAL.\"  -ID consult---> \"suggesting chronic infection such as OPAL. Not related to current problems. He lacks productive cough and otherwise pulmonary symptoms are related to fluid status. Doubt this is a significant findings at this point. Can test sputum if he is able but would not pursue bronchoscopy at this point. Can monitor on serial CT over months to years. TB not suspected, no need for respiratory isolation. \"  - afb sputum pending      Chest pain  -complains of chest pain at dialysis site  -EKG shows sinus " tach with HR of 113 bpm. EKG had much artifact. Troponin x 3 negative .  -tylenol PRN     ESRD  -MWF dialysis.  Plan for HD as above.  -c/w home NaHCO3 and renvela      Encephalopathy, present on admission, likely due to sepsis.  Resolved now.  -EKG shows sinus tach with HR of 113 bpm. EKG had much artifact. Troponin x 3 negative .   -ABG was unremarkable   -CT brain came back unremarkable  -vitamin B12, ammonia, and TSH WNL,   -urine tox + for MJ only  - treating infection      Fall  -had fall in ED with his confusion. Did not hit head.  -CT head unremarkable  -fall precautions   -PT/OT-->recommend home with assist     GERD  -c/w home protonix     Hx of depression  -c/w home zyprexa, haldol, trazodone, and zoloft     Hx of asthma  -was recently being treated for asthma exacerbation with prednisone  -c/w home symbicort, home duoneb and home albuterol      Hx of chronic pain  - home diluadid since confusion has resolved     HTN/CAD  -last echo showed LVEF of 55-60%  -BP and HR stable  -c/w home lasix and coreg with hold parameters     HLD  -c/w home lipitor     Hx of DM  -from current med list, on sliding scale insulin only at home  -HgbA1c 5.7 8/21/2021  -c/w low resistance sliding scale insulin for now  -c/w accuchecks and adjust insulin regimen PRN     Anemia  -Hgb at baseline which is 9-10  -monitor daily      Lightheadedness  -orthostatics negative  -TSH WNL  -PT/OT-->recommend home with assist  -denies any lightheadedness today     Neck pain  -ordered one time dose of flexeril  -consider starting patient on scheduled flexeril if needed      VTE prophylaxis:  Heparin SQ  DIET: Orders Placed This Encounter      Combination Diet Dialysis Diet; Consistent Carb 60 Grams CHO per Meal Diet; Low Fat Diet    Drains/Lines: right internal jugular, PD  Weight bearing status: WBAt  Disposition/Barriers to discharge: pending further abx regiment and renal recs    Code Status: Full Code    Subjective:  Patient is complaining  of not eating all day and requests a meal prior to starting dialysis     PHYSICAL EXAM  Vitals:    08/23/21 1520 08/24/21 0700 08/25/21 0418   Weight: 102.5 kg (226 lb 1 oz) 101.9 kg (224 lb 11.2 oz) 100.5 kg (221 lb 9.6 oz)     B/P:112/55 T:98.4 P:61 R:18     Intake/Output Summary (Last 24 hours) at 8/24/2021 0828  Last data filed at 8/24/2021 0554  Gross per 24 hour   Intake 720 ml   Output 540 ml   Net 180 ml      Body mass index is 31.8 kg/m .  Physical Exam  Constitutional:       General: He is not in acute distress.     Appearance: Normal appearance. He is ill-appearing. He is not toxic-appearing.   HENT:      Head: Normocephalic.   Cardiovascular:      Rate and Rhythm: Normal rate and regular rhythm.      Pulses: Normal pulses.   Pulmonary:      Effort: Pulmonary effort is normal.      Breath sounds: Rales present.   Abdominal:      General: Bowel sounds are normal.      Palpations: Abdomen is soft.      Comments: PD catheter tender     Musculoskeletal:         General: Normal range of motion.   Skin:     General: Skin is warm.   Neurological:      General: No focal deficit present.      Mental Status: He is alert and oriented to person, place, and time. Mental status is at baseline.               PERTINENT LABS/IMAGING:  Results for orders placed or performed during the hospital encounter of 08/20/21   XR Chest Port 1 View    Impression    IMPRESSION: Tunneled right internal jugular dialysis catheter terminates in the SVC. Poststernotomy. Normal heart size and pulmonary vascularity. Mild atelectasis or scarring of the lung bases. Lungs otherwise clear. No pleural fluid or pneumothorax.   CT Chest Pulmonary Embolism w Contrast    Impression    IMPRESSION:  1.  No pulmonary embolus.  2.  Compared to 11/21/2019, progression in chronic bilateral reticulonodular interstitial infiltrates likely on the basis of atypical small airway infection such as OPAL.  3.  No acute process in the abdomen or pelvis.   Abd/pelvis  CT,  IV  contrast only TRAUMA / AAA    Impression    IMPRESSION:  1.  No pulmonary embolus.  2.  Compared to 11/21/2019, progression in chronic bilateral reticulonodular interstitial infiltrates likely on the basis of atypical small airway infection such as OPAL.  3.  No acute process in the abdomen or pelvis.   CT Head w/o Contrast    Impression    IMPRESSION:  1.  No CT evidence for acute intracranial process.    2.  Brain atrophy and presumed chronic microvascular ischemic changes as above.    3.  Study performed after administration of intravenous contrast material. While this slightly limits evaluation for intracranial hemorrhage, no such intracranial hemorrhage is identified.     IR CVC Tunnel Removal Right    Impression    IMPRESSION:    1. Successful tunneled catheter removal.    CPT:  08196    The CPT codes are for physician use only.       Most Recent 3 CBC's:  Recent Labs   Lab Test 08/25/21  0457 08/24/21  0502 08/23/21  0534   WBC 7.4 9.2 11.1*   HGB 9.9* 9.1* 9.6*   MCV 96 95 97    222 212     Most Recent 3 BMP's:  Recent Labs   Lab Test 08/25/21  1144 08/25/21  0743 08/25/21  0457 08/24/21  0502 08/23/21  0534   NA  --   --  138 141 141   POTASSIUM  --   --  3.9 4.3 4.5   CHLORIDE  --   --  103 109* 109*   CO2  --   --  20* 19* 18*   BUN  --   --  29* 48* 47*   CR  --   --  5.98* 8.50* 7.76*   ANIONGAP  --   --  15 13 14   NISREEN  --   --  7.3* 6.8* 7.2*   * 87 81 103 107     Most Recent 2 LFT's:  Recent Labs   Lab Test 08/25/21 0457 08/24/21  0502   AST 13 10   ALT <9 <9   ALKPHOS 89 75   BILITOTAL 0.4 0.4     Most Recent 6 Bacteria Isolates From Any Culture (See EPIC Reports for Culture Details):No lab results found.

## 2021-08-25 NOTE — PHARMACY-VANCOMYCIN DOSING SERVICE
"Pharmacy Vancomycin Note  Date of Service 2021  Patient's  1950   70 year old, male    Indication: Sepsis  Day of Therapy: 5  Current vancomycin regimen: Intermittent with patient on HD  Current vancomycin monitoring method: Trough (Method 2 = manual dose calculation)  Current vancomycin therapeutic monitoring goal: 15-20 mg/L    Current estimated CrCl = Estimated Creatinine Clearance: 13.9 mL/min (A) (based on SCr of 5.98 mg/dL (H)).    Creatinine for last 3 days  2021:  5:34 AM Creatinine 7.76 mg/dL  2021:  5:02 AM Creatinine 8.50 mg/dL  2021:  4:57 AM Creatinine 5.98 mg/dL    Recent Vancomycin Levels (past 3 days)  2021:  5:02 AM Vancomycin 26.1 mg/L  2021:  4:57 AM Vancomycin 22.0 mg/L    Vancomycin IV Administrations (past 72 hours)                   vancomycin 2000 mg in 0.9% NaCl 500 ml intermittent infusion 2,000 mg (mg) 2,000 mg New Bag 21 1056                Nephrotoxins and other renal medications (From now, onward)    Start     Dose/Rate Route Frequency Ordered Stop    21 1430  furosemide (LASIX) tablet 40 mg      40 mg Oral DAILY 21 1423      21 2200  piperacillin-tazobactam (ZOSYN) 3.375 g vial to attach to  mL bag     Note to Pharmacy: For [facility], [facility] and [facility]: For Zosyn-naive patients, use the \"Zosyn initial dose + extended infusion\" order panel.    3.375 g  over 240 Minutes Intravenous EVERY 12 HOURS 21 1716      21 0958  vancomycin place ornelas - receiving intermittent dosing      1 each Intravenous SEE ADMIN INSTRUCTIONS 21 0959               Contrast Orders - past 72 hours (72h ago, onward)    None          Interpretation of levels and current regimen:  Vancomycin level is reflective of supratherapeutic level      Plan:  1. No additional dose today given elevated trough. Continue to monitor levels. Consider level with am labs Friday am after dialysis Thursday.  2. Vancomycin monitoring method: Trough (Method " 2 = manual dose calculation)  3. Vancomycin therapeutic monitoring goal: 15-20 mg/L  4. Pharmacy will check vancomycin levels as appropriate in as indicated.  5. Serum creatinine levels will be ordered a minimum of twice weekly.    Nedra Perry, PharmD, BCPS 08/25/21 12:58 PM

## 2021-08-25 NOTE — PROGRESS NOTES
RENAL PROGRESS NOTE    CC:  ESKD, fever, confusion    ASSESSMENT & PLAN:   ESRD - was on PD, exit and tunnel infx, PD cath removed, then replaced 7/29. Now on temporary IHD via CVC. Chemistries and volume looks controlled.   Recs:  - still complaints about drain pain despite tidal   - Friday for PD cath repositioning  - HD on Thursday, hold on PD for now  - depending on incisions Dr. Foley has to make may be able to resume low volume exchanges as soon as a week after surgery     Fever - inc wbc, c/o pain (among other c/o) from CVC. Agree CVC is a likely source although negative cultures.  S/p removal and replacement.  No growth on cultures  Continues on antibiotics.  ID following,     Lung infiltrates - he's had prominent recurrent c/o sob and he is not volume overloaded and had inc reticulonodular infiltrates. He's taken a lot of prednisone. ID considering OPAL and I discussed with them and further testing pending    Hypertension - trending more hypertensive, on coreg and lasix alone.  I anticipated more improvement with fluid removal on dialysis.  Will consider adding amlodipine     ACD on epo 20K weekly at PD clinic, hgb in the 9s currently     Hyper PTH on renal diet and binders. renvela     HTN amlodipine and coreg     Depression, anxiety - on sertraline and other meds.  Very anxious, driving some of his outburst/anger I think.     Chris Westbrook  [facility]  [phone]                SUBJECTIVE:  Dialysis fine yesterday, CVC worked well.  Sounds as if Dr. Foley has managed to get some OR time on Friday for a reposition of his PD cath.  He's adamant that we correct the PD issues before considering discharge.      OBJECTIVE:  Physical Exam   Temp: 98.3  F (36.8  C) Temp src: Oral BP: (!) 152/65 Pulse: 79   Resp: 22 SpO2: 92 % O2 Device: None (Room air) Oxygen Delivery: 2 LPM  Vitals:    08/23/21 1520 08/24/21 0700 08/25/21 0418   Weight: 102.5 kg (226 lb 1 oz) 101.9 kg (224 lb 11.2  oz) 100.5 kg (221 lb 9.6 oz)     Vital Signs with Ranges  Temp:  [97.4  F (36.3  C)-100  F (37.8  C)] 98.3  F (36.8  C)  Pulse:  [56-86] 79  Resp:  [14-22] 22  BP: (113-193)/(58-91) 152/65  SpO2:  [87 %-100 %] 92 %  I/O last 3 completed shifts:  In: 340 [P.O.:240; I.V.:100]  Out: 3075 [Urine:875; Other:2200]    @TMAXR(24)@    Patient Vitals for the past 72 hrs:   Weight   08/25/21 0418 100.5 kg (221 lb 9.6 oz)   08/24/21 0700 101.9 kg (224 lb 11.2 oz)   08/23/21 1520 102.5 kg (226 lb 1 oz)   08/23/21 0943 102.5 kg (226 lb 1 oz)       Intake/Output Summary (Last 24 hours) at 8/22/2021 0920  Last data filed at 8/22/2021 0630  Gross per 24 hour   Intake 1370 ml   Output 400 ml   Net 970 ml       PHYSICAL EXAM:  General - Alert and oriented x3, appears comfortable, NAD  Neck: RIJ CVC, replaced, no evidence of infection  Cardiovascular - Regular rate and rhythm, no rub  Respiratory - Clear to auscultation bilaterally, no crackles or wheezes  Abd: BS present, PD cath nontender, no peritoneal signs  Extremities - No lower extremity edema bilaterally  Skin: dry, intact, no rash, good turgor  Neuro:  Grossly intact, no focal deficits  MSK:  Grossly intact  Psych: calm today    LABORATORY STUDIES:     Recent Labs   Lab 08/25/21  0457 08/24/21  0502 08/23/21  0534 08/22/21  0451 08/21/21  1124 08/20/21  2245   WBC 7.4 9.2 11.1* 13.4* 19.5* 17.6*   RBC 3.38* 3.07* 3.23* 3.22* 3.60* 3.86*   HGB 9.9* 9.1* 9.6* 9.5* 10.9* 11.5*   HCT 32.3* 29.3* 31.2* 31.0* 34.9* 36.8*    222 212 199 207 225       Basic Metabolic Panel:  Recent Labs   Lab 08/25/21  0743 08/25/21  0457 08/24/21  2108 08/24/21  1652 08/24/21  1203 08/24/21  0812 08/24/21  0502 08/23/21  0534 08/22/21  0451 08/20/21  2245 08/20/21 2243 08/20/21 2243   NA  --  138  --   --   --   --  141 141 139 138  --  139   POTASSIUM  --  3.9  --   --   --   --  4.3 4.5 4.3 4.4  --  4.8   CHLORIDE  --  103  --   --   --   --  109* 109* 107 104  --  106   CO2  --  20*  --    --   --   --  19* 18* 21* 23  --  21*   BUN  --  29*  --   --   --   --  48* 47* 51* 39*  --  38*   CR  --  5.98*  --   --   --   --  8.50* 7.76* 7.54* 5.32*  --  5.39*   GLC 87 81 142* 80 79 103 103 107 110 117   < > 110   NISREEN  --  7.3*  --   --   --   --  6.8* 7.2* 7.1* 8.6  --  8.5    < > = values in this interval not displayed.       INR  Recent Labs   Lab 08/21/21  1551 08/20/21  2245   INR 1.24* 1.11        Recent Labs   Lab Test 08/25/21  0457 08/24/21  0502 08/21/21  1551 08/20/21  2245   INR  --   --  1.24* 1.11   WBC 7.4 9.2  --  17.6*   HGB 9.9* 9.1*  --  11.5*    222  --  225       Personally reviewed current labs

## 2021-08-25 NOTE — PLAN OF CARE
Problem: Adult Inpatient Plan of Care  Goal: Plan of Care Review  Outcome: No Change   Pt verbalized understanding of POC, medications/ IV ABX. Pt has had good PO intake. Assist 1 with gait belt is being used. Bed and chair alarm is being used.   Problem: Pain Acute  Goal: Acceptable Pain Control and Functional Ability  Outcome: Improving   Pt has denied pain.  Pt has been tired this afternoon =Pt's Zyprexa TID has been discontinued. BP spot checked = stable.  Pt is arousable. Will continue POC.

## 2021-08-25 NOTE — PROGRESS NOTES
Access: Dressing intact with no drainage or sign of infection. Arterial limp aspirated well, but pulled tissue in the end. Venous aspirated and flushed well. Lines required reversing after half and hour as arterial continually collapsed. 400 BFR sustained alarm free once reversed.     Summary: Pt experienced nausea associated with crit-line drop early in tx. Goal reduced and NS bolus resolved episode. Pt fell asleep for most of tx and crit-line and b/p monitoring suggested to turn goal back up and was successfully achieved.     Plan: Per renal team.

## 2021-08-25 NOTE — PLAN OF CARE
Pt. Alert and oriented. Continues on telemetry. IV ABX Zosyn over the night. PRN Dilaudid for CVC placement pain. Vital signs stable. Pt. Using call light for all needs. Up to BR ambulatory with steady gait. Will continue to monitor.    Meagan Suarez RN    Problem: Adult Inpatient Plan of Care  Goal: Optimal Comfort and Wellbeing  Outcome: Improving     Problem: Adjustment to Illness (Sepsis/Septic Shock)  Goal: Optimal Coping  Outcome: Improving     Problem: Asthma Comorbidity  Goal: Maintenance of Asthma Control  Outcome: Improving

## 2021-08-26 ENCOUNTER — APPOINTMENT (OUTPATIENT)
Dept: OCCUPATIONAL THERAPY | Facility: HOSPITAL | Age: 71
DRG: 907 | End: 2021-08-26
Payer: MEDICARE

## 2021-08-26 ENCOUNTER — ANESTHESIA EVENT (OUTPATIENT)
Dept: SURGERY | Facility: HOSPITAL | Age: 71
DRG: 907 | End: 2021-08-26
Payer: MEDICARE

## 2021-08-26 LAB
ALBUMIN SERPL-MCNC: 2.3 G/DL (ref 3.5–5)
ALP SERPL-CCNC: 85 U/L (ref 45–120)
ALT SERPL W P-5'-P-CCNC: <9 U/L (ref 0–45)
ANION GAP SERPL CALCULATED.3IONS-SCNC: 13 MMOL/L (ref 5–18)
AST SERPL W P-5'-P-CCNC: 12 U/L (ref 0–40)
BACTERIA BLD CULT: NO GROWTH
BACTERIA SPEC CULT: NO GROWTH
BILIRUB SERPL-MCNC: 0.4 MG/DL (ref 0–1)
BUN SERPL-MCNC: 34 MG/DL (ref 8–22)
CALCIUM SERPL-MCNC: 7.1 MG/DL (ref 8.5–10.5)
CHLORIDE BLD-SCNC: 108 MMOL/L (ref 98–107)
CO2 SERPL-SCNC: 21 MMOL/L (ref 22–31)
CREAT SERPL-MCNC: 7.49 MG/DL (ref 0.7–1.3)
DEPRECATED MISC ALLERGEN IGE RAST QL: NORMAL
ERYTHROCYTE [DISTWIDTH] IN BLOOD BY AUTOMATED COUNT: 14 % (ref 10–15)
GFR SERPL CREATININE-BSD FRML MDRD: 7 ML/MIN/1.73M2
GLUCOSE BLD-MCNC: 109 MG/DL (ref 70–125)
GLUCOSE BLDC GLUCOMTR-MCNC: 108 MG/DL (ref 70–125)
GLUCOSE BLDC GLUCOMTR-MCNC: 135 MG/DL (ref 70–125)
GLUCOSE BLDC GLUCOMTR-MCNC: 159 MG/DL (ref 70–125)
GLUCOSE BLDC GLUCOMTR-MCNC: 89 MG/DL (ref 70–125)
HCT VFR BLD AUTO: 31.2 % (ref 40–53)
HGB BLD-MCNC: 9.7 G/DL (ref 13.3–17.7)
MCH RBC QN AUTO: 29.4 PG (ref 26.5–33)
MCHC RBC AUTO-ENTMCNC: 31.1 G/DL (ref 31.5–36.5)
MCV RBC AUTO: 95 FL (ref 78–100)
PLATELET # BLD AUTO: 158 10E3/UL (ref 150–450)
POTASSIUM BLD-SCNC: 3.8 MMOL/L (ref 3.5–5)
PROT SERPL-MCNC: 6.3 G/DL (ref 6–8)
RBC # BLD AUTO: 3.3 10E6/UL (ref 4.4–5.9)
SODIUM SERPL-SCNC: 142 MMOL/L (ref 136–145)
WBC # BLD AUTO: 7.7 10E3/UL (ref 4–11)

## 2021-08-26 PROCEDURE — 90935 HEMODIALYSIS ONE EVALUATION: CPT | Performed by: INTERNAL MEDICINE

## 2021-08-26 PROCEDURE — 250N000013 HC RX MED GY IP 250 OP 250 PS 637: Performed by: NURSE PRACTITIONER

## 2021-08-26 PROCEDURE — 250N000009 HC RX 250: Performed by: FAMILY MEDICINE

## 2021-08-26 PROCEDURE — 250N000011 HC RX IP 250 OP 636: Performed by: INTERNAL MEDICINE

## 2021-08-26 PROCEDURE — 250N000011 HC RX IP 250 OP 636: Performed by: FAMILY MEDICINE

## 2021-08-26 PROCEDURE — 250N000013 HC RX MED GY IP 250 OP 250 PS 637

## 2021-08-26 PROCEDURE — 99232 SBSQ HOSP IP/OBS MODERATE 35: CPT | Performed by: INTERNAL MEDICINE

## 2021-08-26 PROCEDURE — 97535 SELF CARE MNGMENT TRAINING: CPT | Mod: GO

## 2021-08-26 PROCEDURE — 258N000003 HC RX IP 258 OP 636: Performed by: INTERNAL MEDICINE

## 2021-08-26 PROCEDURE — 94640 AIRWAY INHALATION TREATMENT: CPT

## 2021-08-26 PROCEDURE — 210N000001 HC R&B IMCU HEART CARE

## 2021-08-26 PROCEDURE — 97129 THER IVNTJ 1ST 15 MIN: CPT | Mod: GO

## 2021-08-26 PROCEDURE — 250N000013 HC RX MED GY IP 250 OP 250 PS 637: Performed by: INTERNAL MEDICINE

## 2021-08-26 PROCEDURE — 94640 AIRWAY INHALATION TREATMENT: CPT | Mod: 76

## 2021-08-26 PROCEDURE — 999N000157 HC STATISTIC RCP TIME EA 10 MIN

## 2021-08-26 PROCEDURE — 999N000127 HC STATISTIC PERIPHERAL IV START W US GUIDANCE

## 2021-08-26 PROCEDURE — 85014 HEMATOCRIT: CPT | Performed by: FAMILY MEDICINE

## 2021-08-26 PROCEDURE — 250N000013 HC RX MED GY IP 250 OP 250 PS 637: Performed by: FAMILY MEDICINE

## 2021-08-26 PROCEDURE — 82040 ASSAY OF SERUM ALBUMIN: CPT | Performed by: FAMILY MEDICINE

## 2021-08-26 PROCEDURE — 36415 COLL VENOUS BLD VENIPUNCTURE: CPT | Performed by: FAMILY MEDICINE

## 2021-08-26 RX ORDER — HEPARIN SODIUM 1000 [USP'U]/ML
500 INJECTION, SOLUTION INTRAVENOUS; SUBCUTANEOUS
Status: CANCELLED | OUTPATIENT
Start: 2021-08-26 | End: 2021-08-26

## 2021-08-26 RX ORDER — LACTOBACILLUS RHAMNOSUS GG 10B CELL
1 CAPSULE ORAL
Status: DISCONTINUED | OUTPATIENT
Start: 2021-08-26 | End: 2021-08-28 | Stop reason: HOSPADM

## 2021-08-26 RX ORDER — HEPARIN SODIUM 1000 [USP'U]/ML
500 INJECTION, SOLUTION INTRAVENOUS; SUBCUTANEOUS
Status: DISCONTINUED | OUTPATIENT
Start: 2021-08-26 | End: 2021-08-28 | Stop reason: HOSPADM

## 2021-08-26 RX ORDER — HEPARIN SODIUM 1000 [USP'U]/ML
500 INJECTION, SOLUTION INTRAVENOUS; SUBCUTANEOUS CONTINUOUS
Status: CANCELLED | OUTPATIENT
Start: 2021-08-26

## 2021-08-26 RX ORDER — L. ACIDOPHILUS/PECTIN, CITRUS 25MM-100MG
1 TABLET ORAL
Status: DISCONTINUED | OUTPATIENT
Start: 2021-08-26 | End: 2021-08-26

## 2021-08-26 RX ORDER — HEPARIN SODIUM 1000 [USP'U]/ML
500 INJECTION, SOLUTION INTRAVENOUS; SUBCUTANEOUS CONTINUOUS
Status: DISCONTINUED | OUTPATIENT
Start: 2021-08-26 | End: 2021-08-28 | Stop reason: HOSPADM

## 2021-08-26 RX ADMIN — CETIRIZINE HYDROCHLORIDE 10 MG: 10 TABLET ORAL at 09:38

## 2021-08-26 RX ADMIN — Medication 1 CAPSULE: at 18:34

## 2021-08-26 RX ADMIN — HEPARIN SODIUM 3000 UNITS: 1000 INJECTION INTRAVENOUS; SUBCUTANEOUS at 18:31

## 2021-08-26 RX ADMIN — VANCOMYCIN HYDROCHLORIDE 750 MG: 1 INJECTION, POWDER, LYOPHILIZED, FOR SOLUTION INTRAVENOUS at 20:18

## 2021-08-26 RX ADMIN — HYDROMORPHONE HYDROCHLORIDE 2 MG: 2 TABLET ORAL at 20:10

## 2021-08-26 RX ADMIN — CARVEDILOL 25 MG: 12.5 TABLET, FILM COATED ORAL at 18:34

## 2021-08-26 RX ADMIN — IPRATROPIUM BROMIDE AND ALBUTEROL SULFATE 3 ML: .5; 3 SOLUTION RESPIRATORY (INHALATION) at 21:18

## 2021-08-26 RX ADMIN — HEPARIN SODIUM 3000 UNITS: 1000 INJECTION INTRAVENOUS; SUBCUTANEOUS at 18:32

## 2021-08-26 RX ADMIN — HEPARIN SODIUM 5000 UNITS: 10000 INJECTION, SOLUTION INTRAVENOUS; SUBCUTANEOUS at 13:37

## 2021-08-26 RX ADMIN — OLANZAPINE 2.5 MG: 2.5 TABLET ORAL at 20:15

## 2021-08-26 RX ADMIN — OMEPRAZOLE 20 MG: 20 CAPSULE, DELAYED RELEASE ORAL at 09:38

## 2021-08-26 RX ADMIN — SEVELAMER CARBONATE 1600 MG: 800 TABLET, FILM COATED ORAL at 09:38

## 2021-08-26 RX ADMIN — Medication 1 CAPSULE: at 13:37

## 2021-08-26 RX ADMIN — PIPERACILLIN SODIUM AND TAZOBACTAM SODIUM 3.38 G: 3; .375 INJECTION, POWDER, LYOPHILIZED, FOR SOLUTION INTRAVENOUS at 11:08

## 2021-08-26 RX ADMIN — CARVEDILOL 25 MG: 12.5 TABLET, FILM COATED ORAL at 09:39

## 2021-08-26 RX ADMIN — IPRATROPIUM BROMIDE AND ALBUTEROL SULFATE 3 ML: .5; 3 SOLUTION RESPIRATORY (INHALATION) at 08:24

## 2021-08-26 RX ADMIN — TRAZODONE HYDROCHLORIDE 25 MG: 50 TABLET ORAL at 20:11

## 2021-08-26 RX ADMIN — ALBUTEROL SULFATE 2.5 MG: 2.5 SOLUTION RESPIRATORY (INHALATION) at 15:40

## 2021-08-26 RX ADMIN — ACETAMINOPHEN 650 MG: 325 TABLET ORAL at 20:11

## 2021-08-26 RX ADMIN — PIPERACILLIN SODIUM AND TAZOBACTAM SODIUM 3.38 G: 3; .375 INJECTION, POWDER, LYOPHILIZED, FOR SOLUTION INTRAVENOUS at 22:02

## 2021-08-26 RX ADMIN — FUROSEMIDE 40 MG: 20 TABLET ORAL at 09:37

## 2021-08-26 RX ADMIN — SODIUM CHLORIDE 300 ML: 9 INJECTION, SOLUTION INTRAVENOUS at 16:30

## 2021-08-26 RX ADMIN — SEVELAMER CARBONATE 1600 MG: 800 TABLET, FILM COATED ORAL at 18:34

## 2021-08-26 RX ADMIN — SERTRALINE HYDROCHLORIDE 50 MG: 50 TABLET ORAL at 09:38

## 2021-08-26 RX ADMIN — ATORVASTATIN CALCIUM 10 MG: 10 TABLET, FILM COATED ORAL at 09:42

## 2021-08-26 RX ADMIN — SEVELAMER CARBONATE 1600 MG: 800 TABLET, FILM COATED ORAL at 13:37

## 2021-08-26 RX ADMIN — HEPARIN SODIUM 5000 UNITS: 10000 INJECTION, SOLUTION INTRAVENOUS; SUBCUTANEOUS at 20:11

## 2021-08-26 RX ADMIN — SODIUM CHLORIDE 250 ML: 9 INJECTION, SOLUTION INTRAVENOUS at 18:29

## 2021-08-26 ASSESSMENT — MIFFLIN-ST. JEOR: SCORE: 1779.6

## 2021-08-26 NOTE — CONSULTS
"Name: Dylon Trevizo    MRN: [id number]  Consulting Physician:  Gildardo Foley MD  Date of Admission:  8/20/2021    Reason for Consult       History of Present Illness   Dylon Trevizo is a 70 year old male who I was asked to see for evaluation of pelvic pain.  Patient is a 70-year-old male with end-stage renal disease he was admitted with bacteremia.  He has a peritoneal peritoneal dialysis catheter that was placed at [facility].  He had a prior catheter that worked well this new catheter caused exquisite pain in his bladder and bladder spasms.  I was asked to potentially revise the catheter    Assessment and Plan:   Abdominal pain believed secondary to PD catheter.  I assume is laying up against the bladder.  My plan is laparoscopy and hopefully we contacted catheter somewhere off the bladder.  Patient is scheduled for surgery tomorrow afternoon this was discussed with him and inform consent has been obtained    Primary Care Physician   VICTOR M WOLFE    Past Medical History    Past Medical History:   Diagnosis Date     Anxiety and depression      Asthma, severe persistent, poorly-controlled 1958     ESRD (end stage renal disease) on dialysis (H)      HLD (hyperlipidemia)      Hypertension      IVY (obstructive sleep apnea)      Type 2 diabetes mellitus (H) 09/01/1980        Past Surgical History   Past Surgical History:   Procedure Laterality Date     APPENDECTOMY  1970's     APPENDECTOMY       [facility]     HEAD & NECK SURGERY  as child    \"tumor removed x 2\"     IR CVC TUNNEL PLACEMENT > 5 YRS OF AGE  8/24/2021     IR CVC TUNNEL REMOVAL RIGHT  8/23/2021     FL LAP INSERTION TUNNELED INTRAPERITONEAL CATHETER N/A 10/11/2019    Procedure: INSERTION, CATHETER, PERITONEAL, LAPAROSCOPIC, LYSIS OF ADHESIONS;  Surgeon: Barrie Britt MD;  Location: [facility] [place];  Service: General     THORACIC SURGERY          Prior to Admission Medications   Prior to Admission Medications "   Prescriptions Last Dose Informant Patient Reported? Taking?   HYDROmorphone (DILAUDID) 2 MG tablet   Yes Yes   Sig: Take 2 mg by mouth every 6 hours as needed    OLANZapine (ZYPREXA) 5 MG tablet Unknown at Unknown time  Yes No   Sig: Take 2.5 mg by mouth   acetaminophen (TYLENOL) 325 MG tablet   Yes Yes   Sig: Take 650 mg by mouth every 6 hours as needed for fever or pain    albuterol (VENTOLIN HFA) 108 (90 Base) MCG/ACT inhaler   Yes Yes   Sig: Inhale 2 puffs into the lungs every 6 hours as needed for shortness of breath / dyspnea    aspirin (ASA) 81 MG tablet   Yes Yes   Sig: Take 81 mg by mouth daily    atorvastatin (LIPITOR) 20 MG tablet   Yes Yes   Sig: Take 10 mg by mouth daily    budesonide-formoterol (SYMBICORT) 160-4.5 MCG/ACT Inhaler   Yes Yes   Sig: Inhale 1 puff into the lungs 2 times daily as needed    carvedilol (COREG) 12.5 MG tablet Past Week at Unknown time  Yes Yes   Sig: Take 25 mg by mouth 2 times daily (with meals)    cetirizine (ZYRTEC) 10 MG tablet Past Week at Unknown time  Yes Yes   Sig: Take 10 mg by mouth daily   furosemide (LASIX) 40 MG tablet   Yes Yes   Sig: Take 40 mg by mouth daily    haloperidol (HALDOL) 5 MG tablet Unknown at Unknown time  Yes No   Sig: Take 2.5-5 mg by mouth   insulin lispro (HUMALOG KWIKPEN) 100 UNIT/ML (1 unit dial) KWIKPEN   Yes Yes   Sig: Inject Subcutaneous 3 times daily (before meals)   ipratropium - albuterol 0.5 mg/2.5 mg/3 mL (DUONEB) 0.5-2.5 (3) MG/3ML neb solution   Yes Yes   Sig: 3 mLs every 6 hours as needed    omeprazole (PRILOSEC) 20 MG DR capsule   Yes Yes   Sig: Take 20 mg by mouth 2 times daily as needed   predniSONE (DELTASONE) 20 MG tablet Past Week at Unknown time  Yes Yes   Sig: Take 20 mg by mouth daily    sertraline (ZOLOFT) 50 MG tablet Past Week at Unknown time  Yes Yes   Sig: Take 50 mg by mouth daily    sevelamer carbonate (RENVELA) 800 MG tablet Past Week at Unknown time  Yes Yes   Sig: Take 1,600 mg by mouth 3 times daily (with  meals)   traZODone (DESYREL) 50 MG tablet   Yes Yes   Sig: Take 50 mg by mouth nightly as needed      Facility-Administered Medications: None     Allergies   Allergies   Allergen Reactions     Ace Inhibitors Unknown and Other (See Comments)     Hyperkalemia  Hyperkalemia  hyperkalemia       Losartan Unknown and Other (See Comments)     hyperkalemia  Hyperkalemia  hyperkalemia       Tramadol Other (See Comments) and Shortness Of Breath     SOB  Dyspnea       Aminophylline Nausea and Vomiting and Unknown     Nausea/vomiting       Folic Acid-Vit B6-Vit B12 Unknown     Unknown  Other reaction(s): Unknown  Unknown       Sulfamethoxazole-Trimethoprim Nausea and Vomiting and Nausea     Nausea/vomiting       Theophylline Nausea and Vomiting     Nausea/vomiting       Vitamin B12 Unknown     unknown  Other reaction(s): *Unknown       Ceftriaxone Rash     Came in with strep, rash after eeceiving rocephin, localized to his back only-c/o of itching  Came in with strep, rash after eeceiving rocephin, localized to his back only-c/o of itching  Rash/itching  Came in with strep, rash after eeceiving rocephin, localized to his back only-c/o of itching  Rash/itching  Came in with strep, rash after eeceiving rocephin, localized to his back only-c/o of itching  Tolerated cefazolin 10/2018  Came in with strep, rash after eeceiving rocephin, localized to his back only-c/o of itching       Clavulanic Acid Nausea and Rash     aka  AUGMENTIN  (NAUSEA)  aka  AUGMENTIN  (NAUSEA)  Unknown  aka  AUGMENTIN  (NAUSEA)  Unknown  aka  AUGMENTIN  (NAUSEA)  Other reaction(s): Nausea Only  aka  AUGMENTIN  (NAUSEA)  aka  AUGMENTIN  (NAUSEA)       Hydralazine Muscle Pain (Myalgia) and Rash     Muscle aches, severe cramps, constant, felt like RA in hands  Muscle aches; severe cramps, felt like RA in hands, constant   myalgia         Social History   Social History     Tobacco Use     Smoking status: Former Smoker     Quit date: 01/1999     Years since  quittin.6     Smokeless tobacco: Never Used   Substance Use Topics     Alcohol use: Yes     Comment: Not currently     Drug use: Not Currently        Family History   Family History   Problem Relation Age of Onset     Lung Cancer Father      Heart Disease Father      Lung Cancer Sister      Lung Cancer Brother      Heart Disease Brother      Heart Disease Mother         Review of Systems   A Comprehensive greater than 10 system review of systems was carried out.  Pertinent positives and negatives are noted above.  Otherwise negative for contributory information.  Please see HPI    Physical Exam   Temp: 97.9  F (36.6  C) Temp src: Oral BP: (!) 173/87 Pulse: 65   Resp: 20 SpO2: 94 % O2 Device: None (Room air)      GEN:  Alert, oriented x 3, appears comfortable, NAD.  Abdomen soft, PD catheter in left lower quadrant, previous laparoscopies and mini laparotomy incision well-healed  Data   -Data reviewed today: All pertinent laboratory and imaging results from this encounter were reviewed. I  Recent Labs   Lab 21  0445 21  0457 21  0502   WBC 7.7 7.4 9.2   HGB 9.7* 9.9* 9.1*   HCT 31.2* 32.3* 29.3*   MCV 95 96 95    163 222     Recent Labs   Lab 21  1143 21  0807 21  0445 21  0457 21  0502   NA  --   --  142 138 141   POTASSIUM  --   --  3.8 3.9 4.3   CHLORIDE  --   --  108* 103 109*   CO2  --   --  21* 20* 19*   ANIONGAP  --   --  13 15 13   * 89 109 81 103   BUN  --   --  34* 29* 48*   CR  --   --  7.49* 5.98* 8.50*   GFRESTIMATED  --   --  7* 9* 6*   NISREEN  --   --  7.1* 7.3* 6.8*   PROTTOTAL  --   --  6.3 6.4 6.1   ALBUMIN  --   --  2.3* 2.4* 2.3*   BILITOTAL  --   --  0.4 0.4 0.4   ALKPHOS  --   --  85 89 75   AST  --   --  12 13 10   ALT  --   --  <9 <9 <9     Recent Labs   Lab 21  1551 21  2245   INR 1.24* 1.11       No results found for this or any previous visit (from the past 24 hour(s)).    Brennan Foley MD

## 2021-08-26 NOTE — PROGRESS NOTES
RT RCAT Note:    Patient Acuity: 2  Acuity Level: 5    Patient requested PRN DuoNeb. Pretreatment BS: tight/diminished bilaterally. Post treatment BS: expiratory wheezes throughout. Patient explained he is asthmatic and uses PRN nebs at home. Changed frequency to DuoNeb BID. RT will continue to follow and assess.    Aisha Michelle, RT

## 2021-08-26 NOTE — PROGRESS NOTES
"[facility]    PROGRESS NOTE - Hospitalist Service    Assessment and Plan    Principal Problem:    Sepsis without acute organ dysfunction, due to unspecified organism (H)  Active Problems:    Sinus tachycardia    End-stage renal disease on hemodialysis (H)    Fever, unspecified fever cause    Leukocytosis, unspecified type    Dylon DANNIE Trevizo is a 70 year old old male with h/o Anxiety and depression, Asthma, severe persistent, poorly-controlled (1957), ESRD (end stage renal disease) on dialysis (H), HLD (hyperlipidemia), Hypertension, IVY (obstructive sleep apnea), and Type 2 diabetes mellitus (H) (09/01/1980). comes in for AMS and fever.      Peritonitis/septic,  was septic on admission with fever of 104.7 F and leukocytosis of 17.6  -Patient has had an infection to peritoneal dialysis port recently and has been on antibiotics. PD cath removed, then replaced 7/29. Patient on temporary HD via CVC.    PD on hold  - tunneled catheter in place,  previous removed 8/23   -c/w IV zosyn and IV vanco for now  -Nephrology, ID following  - PD repositioning needed but no longer can perform under IR at [facility], this is scheduled for 8/27 at noon with Dr. Foley.    Suspect OPAL  -CT chest shows: \"Compared to 11/21/2019, progression in chronic bilateral reticulonodular interstitial infiltrates likely on the basis of atypical small airway infection such as OPAL.\"  -ID consult---> \"suggesting chronic infection such as OPAL. Not related to current problems. He lacks productive cough and otherwise pulmonary symptoms are related to fluid status. Doubt this is a significant findings at this point. Can test sputum if he is able but would not pursue bronchoscopy at this point. Can monitor on serial CT over months to years. TB not suspected, no need for respiratory isolation. \"  - afb sputum pending      Chest pain  -complains of chest pain at dialysis site  -EKG shows sinus tach with HR of 113 bpm. EKG had much " artifact. Troponin x 3 negative .  -tylenol PRN     ESRD  -MWF dialysis.  Plan for HD as above.  -c/w home NaHCO3 and renvela      Encephalopathy, present on admission, likely due to sepsis.  Resolved now.  -EKG shows sinus tach with HR of 113 bpm. EKG had much artifact. Troponin x 3 negative .   -ABG was unremarkable   -CT brain came back unremarkable  -vitamin B12, ammonia, and TSH WNL,   -urine tox + for MJ only  - treating infection      Fall  -had fall in ED with his confusion. Did not hit head.  -CT head unremarkable  -fall precautions   -PT/OT-->recommend home with assist     GERD  -c/w home protonix     Hx of depression  -c/w home zyprexa, haldol, trazodone, and zoloft     Hx of asthma  -was recently being treated for asthma exacerbation with prednisone  -c/w home symbicort, home duoneb and home albuterol      Hx of chronic pain  - home diluadid since confusion has resolved     HTN/CAD  -last echo showed LVEF of 55-60%  -BP and HR stable  -c/w home lasix and coreg with hold parameters     HLD  -c/w home lipitor     Hx of DM  -from current med list, on sliding scale insulin only at home  -HgbA1c 5.7 8/21/2021  -c/w low resistance sliding scale insulin for now  -c/w accuchecks and adjust insulin regimen PRN     Anemia  -Hgb at baseline which is 9-10  -monitor daily      Lightheadedness  -orthostatics negative  -TSH WNL  -PT/OT-->recommend home with assist  -denies any lightheadedness today     Neck pain  -ordered one time dose of flexeril  -consider starting patient on scheduled flexeril if needed      VTE prophylaxis:  Heparin SQ  DIET: Orders Placed This Encounter      Combination Diet Dialysis Diet; Consistent Carb 60 Grams CHO per Meal Diet; Low Fat Diet    Drains/Lines: right internal jugular, PD  Weight bearing status: WBAt  Disposition/Barriers to discharge: pending further abx regiment and renal recs    Code Status: Full Code    Subjective:  Patient today.  Awaiting PD repositioning surgery, which is  scheduled for tomorrow.  No chest pain, cough, nausea, dysuria.    PHYSICAL EXAM  Vitals:    08/24/21 0700 08/25/21 0418 08/26/21 0448   Weight: 101.9 kg (224 lb 11.2 oz) 100.5 kg (221 lb 9.6 oz) 100.8 kg (222 lb 4.8 oz)     B/P:112/55 T:98.4 P:61 R:18     Intake/Output Summary (Last 24 hours) at 8/24/2021 0828  Last data filed at 8/24/2021 0554  Gross per 24 hour   Intake 720 ml   Output 540 ml   Net 180 ml      Body mass index is 31.9 kg/m .  Physical Exam  Constitutional:       General: He is not in acute distress.     Appearance: Normal appearance. He is ill-appearing. He is not toxic-appearing.   HENT:      Head: Normocephalic.   Cardiovascular:      Rate and Rhythm: Normal rate and regular rhythm.      Pulses: Normal pulses.   Pulmonary:      Effort: Pulmonary effort is normal.      Breath sounds: Rales present.   Abdominal:      General: Bowel sounds are normal.      Palpations: Abdomen is soft.      Comments: PD catheter tender     Musculoskeletal:         General: Normal range of motion.   Skin:     General: Skin is warm.   Neurological:      General: No focal deficit present.      Mental Status: He is alert and oriented to person, place, and time. Mental status is at baseline.       PERTINENT LABS/IMAGING:  Results for orders placed or performed during the hospital encounter of 08/20/21   XR Chest Port 1 View    Impression    IMPRESSION: Tunneled right internal jugular dialysis catheter terminates in the SVC. Poststernotomy. Normal heart size and pulmonary vascularity. Mild atelectasis or scarring of the lung bases. Lungs otherwise clear. No pleural fluid or pneumothorax.   CT Chest Pulmonary Embolism w Contrast    Impression    IMPRESSION:  1.  No pulmonary embolus.  2.  Compared to 11/21/2019, progression in chronic bilateral reticulonodular interstitial infiltrates likely on the basis of atypical small airway infection such as OPAL.  3.  No acute process in the abdomen or pelvis.   Abd/pelvis CT,  IV   contrast only TRAUMA / AAA    Impression    IMPRESSION:  1.  No pulmonary embolus.  2.  Compared to 11/21/2019, progression in chronic bilateral reticulonodular interstitial infiltrates likely on the basis of atypical small airway infection such as OPAL.  3.  No acute process in the abdomen or pelvis.   CT Head w/o Contrast    Impression    IMPRESSION:  1.  No CT evidence for acute intracranial process.    2.  Brain atrophy and presumed chronic microvascular ischemic changes as above.    3.  Study performed after administration of intravenous contrast material. While this slightly limits evaluation for intracranial hemorrhage, no such intracranial hemorrhage is identified.     IR CVC Tunnel Removal Right    Impression    IMPRESSION:    1. Successful tunneled catheter removal.    CPT:  15219    The CPT codes are for physician use only.       Most Recent 3 CBC's:  Recent Labs   Lab Test 08/26/21 0445 08/25/21 0457 08/24/21  0502   WBC 7.7 7.4 9.2   HGB 9.7* 9.9* 9.1*   MCV 95 96 95    163 222     Most Recent 3 BMP's:  Recent Labs   Lab Test 08/26/21  1143 08/26/21  0807 08/26/21 0445 08/25/21  0457 08/24/21  0502   NA  --   --  142 138 141   POTASSIUM  --   --  3.8 3.9 4.3   CHLORIDE  --   --  108* 103 109*   CO2  --   --  21* 20* 19*   BUN  --   --  34* 29* 48*   CR  --   --  7.49* 5.98* 8.50*   ANIONGAP  --   --  13 15 13   NISREEN  --   --  7.1* 7.3* 6.8*   * 89 109 81 103     Most Recent 2 LFT's:  Recent Labs   Lab Test 08/26/21 0445 08/25/21  0457   AST 12 13   ALT <9 <9   ALKPHOS 85 89   BILITOTAL 0.4 0.4     Most Recent 6 Bacteria Isolates From Any Culture (See EPIC Reports for Culture Details):No lab results found.

## 2021-08-26 NOTE — PROCEDURES
Hemodialysis Treatment Note    Pre weight:   100.83 KG    Post weight: 99.23 kg  Run length:  3 hours  Total fluid removed (ml): 2190 ml's  , Net 1600 ml's  Blood Volume Processed: 68.7  Vascular Access:RIJ Tunneled, Dressing is C/D/I  Treatment Summary: Stable run, Pt slept for about 1 hour during HD.  Interventions: Vitals documented q 15 minutes. Ongoing assessment of pt response to HD.   Plan: Per renal team.  Chantal Stephens RN  [facility]

## 2021-08-26 NOTE — PROGRESS NOTES
Respiratory care note:    PRN albuterol neb x1 given per pt request. Increased aeration post neb tx. HR 65, RR 22, and BBS crackles. Mild accessory muscle use noted.     Afua Murillo, RT

## 2021-08-26 NOTE — PROGRESS NOTES
RENAL PROGRESS NOTE    CC:  ESKD, fever, confusion    ASSESSMENT & PLAN:   ESRD - was on PD, exit and tunnel infx, PD cath removed, then replaced 7/29. Now on temporary IHD via CVC. Chemistries and volume looks controlled.   Recs:  - still complaints about drain pain despite tidal   - Friday for PD cath repositioning  - short HD Friday morning, surgery in the afternoon and if pain controlled could discharge Friday evening.  - depending on incisions Dr. Foley has to make may be able to resume low volume exchanges as soon as a week after surgery     Fever - inc wbc, c/o pain (among other c/o) from CVC. Agree CVC is a likely source although negative cultures.  S/p removal and replacement.  No growth on cultures  Continues on antibiotics.  ID following, they recommend finishing antibiotics on Friday.     Lung infiltrates - he's had prominent recurrent c/o sob and he is not volume overloaded and had inc reticulonodular infiltrates. He's taken a lot of prednisone. ID considering OPAL and I discussed with them and further testing pending    Hypertension - trending more hypertensive, on coreg and lasix alone.  Will consider adding amlodipine     ACD on epo 20K weekly at PD clinic, hgb in the 9s currently     Hyper PTH on renal diet and binders. renvela     Depression, anxiety - on sertraline and other meds.  Very anxious, driving some of his outburst/anger I think.     Chris Westbrook  [facility]  [phone]                SUBJECTIVE:  Seen on dialysis.  CVC reversed but otherwise running well.  Esteban is feeling okay.  Dr. Foley saw him and plans for repositioning of his PD cath tomorrow around 1:30pm.  He's interested in discharging.  We can try to get an early short dialysis run in tomorrow morning and then have surgery and I would be okay if he discharges tomorrow with plan to resume PD training/HD on Monday in [place].    OBJECTIVE:  Physical Exam   Temp: 97.9  F (36.6  C) Temp src: Oral BP:  (!) 162/97 Pulse: 69   Resp: 16 SpO2: 98 % O2 Device: Nasal cannula Oxygen Delivery: 3 LPM  Vitals:    08/24/21 0700 08/25/21 0418 08/26/21 0448   Weight: 101.9 kg (224 lb 11.2 oz) 100.5 kg (221 lb 9.6 oz) 100.8 kg (222 lb 4.8 oz)     Vital Signs with Ranges  Temp:  [97.9  F (36.6  C)-98.9  F (37.2  C)] 97.9  F (36.6  C)  Pulse:  [61-77] 69  Resp:  [16-22] 16  BP: (129-177)/(62-98) 162/97  SpO2:  [91 %-98 %] 98 %  I/O last 3 completed shifts:  In: 480 [P.O.:480]  Out: -     @TMAXR(24)@    Patient Vitals for the past 72 hrs:   Weight   08/26/21 0448 100.8 kg (222 lb 4.8 oz)   08/25/21 0418 100.5 kg (221 lb 9.6 oz)   08/24/21 0700 101.9 kg (224 lb 11.2 oz)       Intake/Output Summary (Last 24 hours) at 8/22/2021 0920  Last data filed at 8/22/2021 0630  Gross per 24 hour   Intake 1370 ml   Output 400 ml   Net 970 ml       PHYSICAL EXAM:  General - Alert and oriented x3, appears comfortable, NAD  Neck: RIJ CVC, replaced, no evidence of infection  Cardiovascular - Regular rate and rhythm, no rub  Respiratory - Clear to auscultation bilaterally, no crackles or wheezes  Abd: BS present, PD cath nontender, no peritoneal signs  Extremities - No lower extremity edema bilaterally  Skin: dry, intact, no rash, good turgor  Neuro:  Grossly intact, no focal deficits  MSK:  Grossly intact  Psych: calm today    LABORATORY STUDIES:     Recent Labs   Lab 08/26/21  0445 08/25/21  0457 08/24/21  0502 08/23/21  0534 08/22/21  0451 08/21/21  1124   WBC 7.7 7.4 9.2 11.1* 13.4* 19.5*   RBC 3.30* 3.38* 3.07* 3.23* 3.22* 3.60*   HGB 9.7* 9.9* 9.1* 9.6* 9.5* 10.9*   HCT 31.2* 32.3* 29.3* 31.2* 31.0* 34.9*    163 222 212 199 207       Basic Metabolic Panel:  Recent Labs   Lab 08/26/21  1143 08/26/21  0807 08/26/21  0445 08/25/21  2113 08/25/21  1718 08/25/21  1144 08/25/21  0457 08/24/21  0502 08/23/21  0534 08/22/21  0451 08/20/21  2245   NA  --   --  142  --   --   --  138 141 141 139 138   POTASSIUM  --   --  3.8  --   --   --  3.9  4.3 4.5 4.3 4.4   CHLORIDE  --   --  108*  --   --   --  103 109* 109* 107 104   CO2  --   --  21*  --   --   --  20* 19* 18* 21* 23   BUN  --   --  34*  --   --   --  29* 48* 47* 51* 39*   CR  --   --  7.49*  --   --   --  5.98* 8.50* 7.76* 7.54* 5.32*   * 89 109 152* 104 169* 81 103 107 110 117   NISREEN  --   --  7.1*  --   --   --  7.3* 6.8* 7.2* 7.1* 8.6       INR  Recent Labs   Lab 08/21/21  1551 08/20/21 2245   INR 1.24* 1.11        Recent Labs   Lab Test 08/26/21  0445 08/25/21  0457 08/21/21  1551 08/20/21  2245   INR  --   --  1.24* 1.11   WBC 7.7 7.4  --  17.6*   HGB 9.7* 9.9*  --  11.5*    163  --  225       Personally reviewed current labs

## 2021-08-26 NOTE — PLAN OF CARE
Problem: Adjustment to Illness (Sepsis/Septic Shock)  Goal: Optimal Coping  Outcome: No Change     Problem: Bleeding (Sepsis/Septic Shock)  Goal: Absence of Bleeding  Outcome: No Change       Problem: Infection Progression (Sepsis)  Goal: Absence of Infection Signs and Symptoms  Outcome: No Change  Intervention: Promote Recovery  Recent Flowsheet Documentation  Taken 8/26/2021 0400 by Des Baptiste, RN  Activity Management:   activity adjusted per tolerance   activity encouraged   activity minimized   standing at bedside   stepped/marched in place   up ad [place]  Taken 8/26/2021 0000 by Des Baptiste, RN  Activity Management:   activity adjusted per tolerance   activity encouraged   activity minimized   standing at bedside   stepped/marched in place   up ad [place]  Taken 8/25/2021 2003 by Des Baptiste, RN  Activity Management:   activity adjusted per tolerance   activity encouraged   ambulated in [place]   ambulated in room   ambulated outside   ambulated to bathroom   standing at bedside   up ad [place]   up in chair  Taken 8/25/2021 2000 by Des Baptiste, RN  Activity Management:   activity adjusted per tolerance   activity encouraged   activity minimized   standing at bedside   stepped/marched in place   up ad [place]    VSS.  Denies pain/discomfort.  LS: Diminished in the bases, bilat.  Maintaining sat's in the mid 90's at RA.  Currently, holding PD.  Has HD this AM.  AM Crt at 7.49.  A&O X4.

## 2021-08-26 NOTE — PLAN OF CARE
Problem: Adult Inpatient Plan of Care  Goal: Plan of Care Review  Outcome: No Change  Flowsheets (Taken 8/26/2021 5768)  Plan of Care Reviewed With: patient   Pt verbalized understanding of POC, tele status, medications and procedure with Dr Sharp PD site repositioning tomorrow at noon.  Pt verbalized understanding to use call light, SBA is being used with gait belt. VSS, NSR,Pt denied pain. Pt has had an uneventful shift and is having dialysis at this time as ordered.

## 2021-08-27 ENCOUNTER — ANESTHESIA (OUTPATIENT)
Dept: SURGERY | Facility: HOSPITAL | Age: 71
DRG: 907 | End: 2021-08-27
Payer: MEDICARE

## 2021-08-27 LAB
ABO/RH(D): NORMAL
ALBUMIN SERPL-MCNC: 2.3 G/DL (ref 3.5–5)
ALP SERPL-CCNC: 85 U/L (ref 45–120)
ALT SERPL W P-5'-P-CCNC: <9 U/L (ref 0–45)
ANION GAP SERPL CALCULATED.3IONS-SCNC: 12 MMOL/L (ref 5–18)
ANTIBODY SCREEN: NEGATIVE
AST SERPL W P-5'-P-CCNC: 14 U/L (ref 0–40)
BACTERIA BLD CULT: NO GROWTH
BACTERIA BLD CULT: NO GROWTH
BILIRUB SERPL-MCNC: 0.4 MG/DL (ref 0–1)
BUN SERPL-MCNC: 20 MG/DL (ref 8–22)
CALCIUM SERPL-MCNC: 7.3 MG/DL (ref 8.5–10.5)
CHLORIDE BLD-SCNC: 104 MMOL/L (ref 98–107)
CO2 SERPL-SCNC: 22 MMOL/L (ref 22–31)
CREAT SERPL-MCNC: 5.48 MG/DL (ref 0.7–1.3)
ERYTHROCYTE [DISTWIDTH] IN BLOOD BY AUTOMATED COUNT: 14 % (ref 10–15)
GFR SERPL CREATININE-BSD FRML MDRD: 10 ML/MIN/1.73M2
GLUCOSE BLD-MCNC: 80 MG/DL (ref 70–125)
GLUCOSE BLDC GLUCOMTR-MCNC: 111 MG/DL (ref 70–125)
GLUCOSE BLDC GLUCOMTR-MCNC: 122 MG/DL (ref 70–125)
GLUCOSE BLDC GLUCOMTR-MCNC: 245 MG/DL (ref 70–125)
GLUCOSE BLDC GLUCOMTR-MCNC: 84 MG/DL (ref 70–125)
GLUCOSE BLDC GLUCOMTR-MCNC: 90 MG/DL (ref 70–125)
GLUCOSE BLDC GLUCOMTR-MCNC: 95 MG/DL (ref 70–125)
HCT VFR BLD AUTO: 31.1 % (ref 40–53)
HGB BLD-MCNC: 9.7 G/DL (ref 13.3–17.7)
MAGNESIUM SERPL-MCNC: 1.8 MG/DL (ref 1.8–2.6)
MCH RBC QN AUTO: 29.8 PG (ref 26.5–33)
MCHC RBC AUTO-ENTMCNC: 31.2 G/DL (ref 31.5–36.5)
MCV RBC AUTO: 96 FL (ref 78–100)
PLATELET # BLD AUTO: 124 10E3/UL (ref 150–450)
POTASSIUM BLD-SCNC: 3.9 MMOL/L (ref 3.5–5)
PROT SERPL-MCNC: 6.3 G/DL (ref 6–8)
RBC # BLD AUTO: 3.25 10E6/UL (ref 4.4–5.9)
SODIUM SERPL-SCNC: 138 MMOL/L (ref 136–145)
SPECIMEN EXPIRATION DATE: NORMAL
WBC # BLD AUTO: 8.2 10E3/UL (ref 4–11)

## 2021-08-27 PROCEDURE — 210N000001 HC R&B IMCU HEART CARE

## 2021-08-27 PROCEDURE — 83735 ASSAY OF MAGNESIUM: CPT | Performed by: HOSPITALIST

## 2021-08-27 PROCEDURE — 36415 COLL VENOUS BLD VENIPUNCTURE: CPT | Performed by: FAMILY MEDICINE

## 2021-08-27 PROCEDURE — 250N000009 HC RX 250: Performed by: INTERNAL MEDICINE

## 2021-08-27 PROCEDURE — 250N000009 HC RX 250: Performed by: FAMILY MEDICINE

## 2021-08-27 PROCEDURE — 250N000013 HC RX MED GY IP 250 OP 250 PS 637: Performed by: INTERNAL MEDICINE

## 2021-08-27 PROCEDURE — 250N000011 HC RX IP 250 OP 636: Performed by: NURSE ANESTHETIST, CERTIFIED REGISTERED

## 2021-08-27 PROCEDURE — 250N000013 HC RX MED GY IP 250 OP 250 PS 637

## 2021-08-27 PROCEDURE — 710N000010 HC RECOVERY PHASE 1, LEVEL 2, PER MIN: Performed by: SURGERY

## 2021-08-27 PROCEDURE — 250N000009 HC RX 250: Performed by: NURSE ANESTHETIST, CERTIFIED REGISTERED

## 2021-08-27 PROCEDURE — 94002 VENT MGMT INPAT INIT DAY: CPT

## 2021-08-27 PROCEDURE — 99207 PR NO CHARGE LOS: CPT | Performed by: INTERNAL MEDICINE

## 2021-08-27 PROCEDURE — 85027 COMPLETE CBC AUTOMATED: CPT | Performed by: FAMILY MEDICINE

## 2021-08-27 PROCEDURE — 999N000157 HC STATISTIC RCP TIME EA 10 MIN

## 2021-08-27 PROCEDURE — 258N000003 HC RX IP 258 OP 636: Performed by: NURSE ANESTHETIST, CERTIFIED REGISTERED

## 2021-08-27 PROCEDURE — 250N000025 HC SEVOFLURANE, PER MIN: Performed by: SURGERY

## 2021-08-27 PROCEDURE — 250N000013 HC RX MED GY IP 250 OP 250 PS 637: Performed by: FAMILY MEDICINE

## 2021-08-27 PROCEDURE — 250N000013 HC RX MED GY IP 250 OP 250 PS 637: Performed by: SURGERY

## 2021-08-27 PROCEDURE — 99232 SBSQ HOSP IP/OBS MODERATE 35: CPT | Performed by: INTERNAL MEDICINE

## 2021-08-27 PROCEDURE — 80053 COMPREHEN METABOLIC PANEL: CPT | Performed by: FAMILY MEDICINE

## 2021-08-27 PROCEDURE — 272N000001 HC OR GENERAL SUPPLY STERILE: Performed by: SURGERY

## 2021-08-27 PROCEDURE — 250N000011 HC RX IP 250 OP 636: Performed by: ANESTHESIOLOGY

## 2021-08-27 PROCEDURE — P9041 ALBUMIN (HUMAN),5%, 50ML: HCPCS | Performed by: ANESTHESIOLOGY

## 2021-08-27 PROCEDURE — 999N000141 HC STATISTIC PRE-PROCEDURE NURSING ASSESSMENT: Performed by: SURGERY

## 2021-08-27 PROCEDURE — 250N000013 HC RX MED GY IP 250 OP 250 PS 637: Performed by: NURSE PRACTITIONER

## 2021-08-27 PROCEDURE — 0WWG43Z REVISION OF INFUSION DEVICE IN PERITONEAL CAVITY, PERCUTANEOUS ENDOSCOPIC APPROACH: ICD-10-PCS | Performed by: SURGERY

## 2021-08-27 PROCEDURE — 86901 BLOOD TYPING SEROLOGIC RH(D): CPT | Performed by: ANESTHESIOLOGY

## 2021-08-27 PROCEDURE — 250N000011 HC RX IP 250 OP 636: Performed by: FAMILY MEDICINE

## 2021-08-27 PROCEDURE — 250N000011 HC RX IP 250 OP 636: Performed by: SURGERY

## 2021-08-27 PROCEDURE — 360N000076 HC SURGERY LEVEL 3, PER MIN: Performed by: SURGERY

## 2021-08-27 PROCEDURE — 258N000003 HC RX IP 258 OP 636: Performed by: ANESTHESIOLOGY

## 2021-08-27 PROCEDURE — 94640 AIRWAY INHALATION TREATMENT: CPT

## 2021-08-27 PROCEDURE — 370N000017 HC ANESTHESIA TECHNICAL FEE, PER MIN: Performed by: SURGERY

## 2021-08-27 PROCEDURE — 250N000012 HC RX MED GY IP 250 OP 636 PS 637: Performed by: FAMILY MEDICINE

## 2021-08-27 PROCEDURE — 36415 COLL VENOUS BLD VENIPUNCTURE: CPT | Performed by: HOSPITALIST

## 2021-08-27 RX ORDER — ONDANSETRON 4 MG/1
4 TABLET, ORALLY DISINTEGRATING ORAL EVERY 30 MIN PRN
Status: DISCONTINUED | OUTPATIENT
Start: 2021-08-27 | End: 2021-08-27 | Stop reason: HOSPADM

## 2021-08-27 RX ORDER — BUPIVACAINE HYDROCHLORIDE 2.5 MG/ML
INJECTION, SOLUTION EPIDURAL; INFILTRATION; INTRACAUDAL PRN
Status: DISCONTINUED | OUTPATIENT
Start: 2021-08-27 | End: 2021-08-27 | Stop reason: HOSPADM

## 2021-08-27 RX ORDER — ALBUMIN, HUMAN INJ 5% 5 %
12.5 SOLUTION INTRAVENOUS ONCE
Status: COMPLETED | OUTPATIENT
Start: 2021-08-27 | End: 2021-08-27

## 2021-08-27 RX ORDER — FENTANYL CITRATE 50 UG/ML
INJECTION, SOLUTION INTRAMUSCULAR; INTRAVENOUS PRN
Status: DISCONTINUED | OUTPATIENT
Start: 2021-08-27 | End: 2021-08-27

## 2021-08-27 RX ORDER — FENTANYL CITRATE 50 UG/ML
25 INJECTION, SOLUTION INTRAMUSCULAR; INTRAVENOUS EVERY 5 MIN PRN
Status: DISCONTINUED | OUTPATIENT
Start: 2021-08-27 | End: 2021-08-27 | Stop reason: HOSPADM

## 2021-08-27 RX ORDER — KETAMINE HYDROCHLORIDE 50 MG/ML
INJECTION, SOLUTION INTRAMUSCULAR; INTRAVENOUS PRN
Status: DISCONTINUED | OUTPATIENT
Start: 2021-08-27 | End: 2021-08-27

## 2021-08-27 RX ORDER — SODIUM CHLORIDE 9 MG/ML
INJECTION, SOLUTION INTRAVENOUS CONTINUOUS
Status: DISCONTINUED | OUTPATIENT
Start: 2021-08-27 | End: 2021-08-27 | Stop reason: HOSPADM

## 2021-08-27 RX ORDER — NEOSTIGMINE METHYLSULFATE 1 MG/ML
VIAL (ML) INJECTION PRN
Status: DISCONTINUED | OUTPATIENT
Start: 2021-08-27 | End: 2021-08-27

## 2021-08-27 RX ORDER — CEFAZOLIN SODIUM 2 G/100ML
2 INJECTION, SOLUTION INTRAVENOUS
Status: DISCONTINUED | OUTPATIENT
Start: 2021-08-27 | End: 2021-08-27 | Stop reason: HOSPADM

## 2021-08-27 RX ORDER — ACETAMINOPHEN 325 MG/1
975 TABLET ORAL ONCE
Status: COMPLETED | OUTPATIENT
Start: 2021-08-27 | End: 2021-08-27

## 2021-08-27 RX ORDER — HYDROMORPHONE HCL IN WATER/PF 6 MG/30 ML
0.2 PATIENT CONTROLLED ANALGESIA SYRINGE INTRAVENOUS EVERY 5 MIN PRN
Status: DISCONTINUED | OUTPATIENT
Start: 2021-08-27 | End: 2021-08-27 | Stop reason: HOSPADM

## 2021-08-27 RX ORDER — SODIUM CHLORIDE, SODIUM LACTATE, POTASSIUM CHLORIDE, CALCIUM CHLORIDE 600; 310; 30; 20 MG/100ML; MG/100ML; MG/100ML; MG/100ML
INJECTION, SOLUTION INTRAVENOUS CONTINUOUS
Status: CANCELLED | OUTPATIENT
Start: 2021-08-27

## 2021-08-27 RX ORDER — ONDANSETRON 2 MG/ML
4 INJECTION INTRAMUSCULAR; INTRAVENOUS EVERY 30 MIN PRN
Status: DISCONTINUED | OUTPATIENT
Start: 2021-08-27 | End: 2021-08-27 | Stop reason: HOSPADM

## 2021-08-27 RX ORDER — LIDOCAINE HYDROCHLORIDE 20 MG/ML
INJECTION, SOLUTION INFILTRATION; PERINEURAL PRN
Status: DISCONTINUED | OUTPATIENT
Start: 2021-08-27 | End: 2021-08-27

## 2021-08-27 RX ORDER — ONDANSETRON 2 MG/ML
INJECTION INTRAMUSCULAR; INTRAVENOUS PRN
Status: DISCONTINUED | OUTPATIENT
Start: 2021-08-27 | End: 2021-08-27

## 2021-08-27 RX ORDER — CEFAZOLIN SODIUM 2 G/100ML
2 INJECTION, SOLUTION INTRAVENOUS SEE ADMIN INSTRUCTIONS
Status: DISCONTINUED | OUTPATIENT
Start: 2021-08-27 | End: 2021-08-27 | Stop reason: HOSPADM

## 2021-08-27 RX ORDER — LABETALOL HYDROCHLORIDE 5 MG/ML
INJECTION, SOLUTION INTRAVENOUS PRN
Status: DISCONTINUED | OUTPATIENT
Start: 2021-08-27 | End: 2021-08-27

## 2021-08-27 RX ORDER — GLYCOPYRROLATE 0.2 MG/ML
INJECTION, SOLUTION INTRAMUSCULAR; INTRAVENOUS PRN
Status: DISCONTINUED | OUTPATIENT
Start: 2021-08-27 | End: 2021-08-27

## 2021-08-27 RX ORDER — PROPOFOL 10 MG/ML
INJECTION, EMULSION INTRAVENOUS PRN
Status: DISCONTINUED | OUTPATIENT
Start: 2021-08-27 | End: 2021-08-27

## 2021-08-27 RX ORDER — LIDOCAINE 40 MG/G
CREAM TOPICAL
Status: DISCONTINUED | OUTPATIENT
Start: 2021-08-27 | End: 2021-08-27 | Stop reason: HOSPADM

## 2021-08-27 RX ADMIN — FENTANYL CITRATE 100 MCG: 50 INJECTION, SOLUTION INTRAMUSCULAR; INTRAVENOUS at 13:17

## 2021-08-27 RX ADMIN — IPRATROPIUM BROMIDE AND ALBUTEROL SULFATE 3 ML: .5; 3 SOLUTION RESPIRATORY (INHALATION) at 09:31

## 2021-08-27 RX ADMIN — MIDAZOLAM HYDROCHLORIDE 2 MG: 1 INJECTION, SOLUTION INTRAMUSCULAR; INTRAVENOUS at 13:03

## 2021-08-27 RX ADMIN — PIPERACILLIN SODIUM AND TAZOBACTAM SODIUM 3.38 G: 3; .375 INJECTION, POWDER, LYOPHILIZED, FOR SOLUTION INTRAVENOUS at 10:34

## 2021-08-27 RX ADMIN — ATORVASTATIN CALCIUM 10 MG: 10 TABLET, FILM COATED ORAL at 08:32

## 2021-08-27 RX ADMIN — ONDANSETRON 4 MG: 2 INJECTION INTRAMUSCULAR; INTRAVENOUS at 13:03

## 2021-08-27 RX ADMIN — FENTANYL CITRATE 25 MCG: 50 INJECTION, SOLUTION INTRAMUSCULAR; INTRAVENOUS at 15:30

## 2021-08-27 RX ADMIN — HYDROMORPHONE HYDROCHLORIDE 2 MG: 2 TABLET ORAL at 20:40

## 2021-08-27 RX ADMIN — PHENYLEPHRINE HYDROCHLORIDE 100 MCG: 10 INJECTION INTRAVENOUS at 13:40

## 2021-08-27 RX ADMIN — FAMOTIDINE 20 MG: 10 INJECTION, SOLUTION INTRAVENOUS at 11:06

## 2021-08-27 RX ADMIN — ACETAMINOPHEN 325 MG: 325 TABLET ORAL at 12:35

## 2021-08-27 RX ADMIN — SODIUM CHLORIDE: 9 INJECTION, SOLUTION INTRAVENOUS at 12:55

## 2021-08-27 RX ADMIN — FENTANYL CITRATE 25 MCG: 50 INJECTION, SOLUTION INTRAMUSCULAR; INTRAVENOUS at 15:23

## 2021-08-27 RX ADMIN — Medication 1 CAPSULE: at 06:25

## 2021-08-27 RX ADMIN — KETAMINE HYDROCHLORIDE 25 MG: 50 INJECTION, SOLUTION INTRAMUSCULAR; INTRAVENOUS at 13:17

## 2021-08-27 RX ADMIN — NEOSTIGMINE METHYLSULFATE 5 MG: 1 INJECTION INTRAVENOUS at 13:44

## 2021-08-27 RX ADMIN — GLYCOPYRROLATE 0.2 MG: 0.2 INJECTION, SOLUTION INTRAMUSCULAR; INTRAVENOUS at 13:24

## 2021-08-27 RX ADMIN — PHENYLEPHRINE HYDROCHLORIDE 200 MCG: 10 INJECTION INTRAVENOUS at 13:29

## 2021-08-27 RX ADMIN — PHENYLEPHRINE HYDROCHLORIDE 100 MCG: 10 INJECTION INTRAVENOUS at 13:27

## 2021-08-27 RX ADMIN — FENTANYL CITRATE 25 MCG: 50 INJECTION, SOLUTION INTRAMUSCULAR; INTRAVENOUS at 15:49

## 2021-08-27 RX ADMIN — LIDOCAINE HYDROCHLORIDE 40 MG: 20 INJECTION, SOLUTION INFILTRATION; PERINEURAL at 13:17

## 2021-08-27 RX ADMIN — SEVELAMER CARBONATE 1600 MG: 800 TABLET, FILM COATED ORAL at 18:37

## 2021-08-27 RX ADMIN — OLANZAPINE 2.5 MG: 2.5 TABLET ORAL at 20:41

## 2021-08-27 RX ADMIN — PHENYLEPHRINE HYDROCHLORIDE 100 MCG: 10 INJECTION INTRAVENOUS at 13:24

## 2021-08-27 RX ADMIN — GLYCOPYRROLATE 0.8 MG: 0.2 INJECTION, SOLUTION INTRAMUSCULAR; INTRAVENOUS at 13:44

## 2021-08-27 RX ADMIN — LABETALOL HYDROCHLORIDE 10 MG: 5 INJECTION, SOLUTION INTRAVENOUS at 14:05

## 2021-08-27 RX ADMIN — CISATRACURIUM BESYLATE 14 MG: 2 INJECTION INTRAVENOUS at 13:17

## 2021-08-27 RX ADMIN — KETAMINE HYDROCHLORIDE 25 MG: 50 INJECTION, SOLUTION INTRAMUSCULAR; INTRAVENOUS at 13:29

## 2021-08-27 RX ADMIN — INSULIN ASPART 1 UNITS: 100 INJECTION, SOLUTION INTRAVENOUS; SUBCUTANEOUS at 23:08

## 2021-08-27 RX ADMIN — FENTANYL CITRATE 25 MCG: 50 INJECTION, SOLUTION INTRAMUSCULAR; INTRAVENOUS at 15:41

## 2021-08-27 RX ADMIN — PHENYLEPHRINE HYDROCHLORIDE 200 MCG: 10 INJECTION INTRAVENOUS at 13:34

## 2021-08-27 RX ADMIN — Medication 1 CAPSULE: at 18:36

## 2021-08-27 RX ADMIN — SERTRALINE HYDROCHLORIDE 50 MG: 50 TABLET ORAL at 08:29

## 2021-08-27 RX ADMIN — ACETAMINOPHEN 650 MG: 325 TABLET ORAL at 06:48

## 2021-08-27 RX ADMIN — PROPOFOL 160 MG: 10 INJECTION, EMULSION INTRAVENOUS at 13:17

## 2021-08-27 RX ADMIN — PHENYLEPHRINE HYDROCHLORIDE 100 MCG: 10 INJECTION INTRAVENOUS at 13:21

## 2021-08-27 RX ADMIN — ALBUMIN HUMAN 12.5 G: 0.05 INJECTION, SOLUTION INTRAVENOUS at 15:31

## 2021-08-27 RX ADMIN — CETIRIZINE HYDROCHLORIDE 10 MG: 10 TABLET ORAL at 08:29

## 2021-08-27 RX ADMIN — CARVEDILOL 25 MG: 12.5 TABLET, FILM COATED ORAL at 18:37

## 2021-08-27 RX ADMIN — LORAZEPAM 0.25 MG: 0.5 TABLET ORAL at 20:42

## 2021-08-27 ASSESSMENT — LIFESTYLE VARIABLES: TOBACCO_USE: 1

## 2021-08-27 ASSESSMENT — MIFFLIN-ST. JEOR: SCORE: 1771.43

## 2021-08-27 NOTE — PROGRESS NOTES
Brief ID Follow-up Note    Chart reviewed. Patient getting PD revised during my rounds. Cultures remain negative. No signs of ongoing infection.    I will discontinue antibiotics today    ID will sign off. Call with questions.    Shorty Luna MD  [facility]  Direct messaging: [facility] Paging  On-Call ID provider: [phone], option: 9

## 2021-08-27 NOTE — PROGRESS NOTES
"Daily Progress Note        CODE STATUS:  Full Code    08/27/21  Assessment/Plan:  Dylon Trevizo is a 70 year old male with past medical history of anxiety and depression, Asthma, severe persistent, poorly-controlled (1957), ESRD (end stage renal disease) on dialysis , HLD, Hypertension, IVY (obstructive sleep apnea), and Type 2 diabetes mellitus brought to to ED for evaluation of altered mental status and fever and admitted for further management      Suspected sepsis; likely from infected dialysis catheter per ID  On admission with fever of 104.7 F and leukocytosis of 17.6.  --Patient has an infection to peritoneal dialysis port recently and has been on antibiotics. PD cath removed, then replaced 7/29. Patient on temporary HD via CVC.    --Tunneled catheter in place,  previous removed 8/23   --On IV zosyn and IV vanco, per ID note dated 8/25 continue through surgery on Friday then stop if no sign of persistent infection.  --PD catheter needing repositioning and schedule with surgeon, Dr. Foley this afternoon.    ESRD on dialysis;  --MWF dialysis. Defer to nephrology.    Acute metabolic encephalopathy; likely due to sepsis. Resolved now  --ABG was unremarkable   --CT brain came back unremarkable  --vitamin B12, ammonia, and TSH within normal limit.,   --urine tox + for MJ only  --Continue treatment for infection. Continue clinical monitoring.    Suspect OPAL;  -CT chest shows: \"Compared to 11/21/2019, progression in chronic bilateral reticulonodular interstitial infiltrates likely on the basis of atypical small airway infection such as OPAL.\"  -ID consult---> \"suggesting chronic infection such as OPAL. Not related to current problems. He lacks productive cough and otherwise pulmonary symptoms are related to fluid status. Doubt this is a significant findings at this point. Can test sputum if he is able but would not pursue bronchoscopy at this point. Can monitor on serial CT over months to years. TB not suspected, " "no need for respiratory isolation. \"     History of GERD  -c/w home protonix     Hx of depression  -c/w home zyprexa, haldol, trazodone, and zoloft     Hx of asthma  -was recently being treated for asthma exacerbation with prednisone  -c/w home symbicort, home duoneb and home albuterol      Hx of chronic pain  - home diluadid since confusion has resolved     HTN/CAD  -last echo showed LVEF of 55-60%  -BP and HR stable  -c/w home lasix and coreg with hold parameters     HLD  -c/w home lipitor     Hx of DM  -from current med list, on sliding scale insulin only at home  -HgbA1c 5.7 8/21/2021  -c/w low resistance sliding scale insulin for now  -c/w accuchecks and adjust insulin regimen PRN     Anemia  -Hgb at baseline which is 9-10  -monitor daily     VTE prophylaxis:  Heparin subcutaneous    Physical deconditioning; improving. PT OT on case    Disposition; anticipate home  Barrier to discharge; procedure pending    Addendum;  --Seen patient postoperatively, reported currently no pain then during my visit had coughing spells and started complaining of pain, patient does not feel comfortable going home, will monitor overnight for postoperative pain and anticipating discharge tomorrow morning.     LOS: 6 days     Subjective:  Interval History: Patient is new to me. Patient seen and examined. Notes, labs, imaging reports personally reviewed. Patient anxious about his surgical procedure this afternoon. Also reports that with empty stomach he feels sick and does not want to take morning medications. Denied feeling short of breath or chest pain or dizziness. Reported feeling sick to stomach which he attributes to not able to eat as patient n.p.o. for procedure. Discussed with nursing staffs to monitor blood sugar while n.p.o.    Review of Systems:   As mentioned in subjective.    Patient Active Problem List   Diagnosis     Asthma, severe persistent, poorly-controlled     Hypertension     Type 2 diabetes mellitus (H)     CKD " (chronic kidney disease) stage 5, GFR less than 15 ml/min (H)     Anxiety and depression     Sinus tachycardia     End-stage renal disease on hemodialysis (H)     Fever, unspecified fever cause     Leukocytosis, unspecified type     Sepsis without acute organ dysfunction, due to unspecified organism (H)       Scheduled Meds:    atorvastatin  10 mg Oral Daily     carvedilol  25 mg Oral BID w/meals     cetirizine  10 mg Oral Daily     furosemide  40 mg Oral Daily     heparin (porcine)  500 Units Hemodialysis Machine OR IV Push Once in dialysis/CRRT     heparin ANTICOAGULANT  5,000 Units Subcutaneous Q8H     insulin aspart  1-3 Units Subcutaneous TID AC     insulin aspart  1-3 Units Subcutaneous At Bedtime     ipratropium - albuterol 0.5 mg/2.5 mg/3 mL  3 mL Nebulization BID     lactobacillus rhamnosus (GG)  1 capsule Oral TID AC     OLANZapine  2.5 mg Oral At Bedtime     piperacillin-tazobactam  3.375 g Intravenous Q12H     sertraline  50 mg Oral Daily     sevelamer carbonate  1,600 mg Oral TID w/meals     sodium chloride (PF)  3 mL Intracatheter Q8H     sodium chloride (PF)  9 mL Intracatheter During Dialysis/CRRT (from stock)     sodium chloride (PF) 0.9%  1.3-2.6 mL Intracatheter Once in dialysis/CRRT     sodium chloride (PF) 0.9%  1.3-2.6 mL Intracatheter Once in dialysis/CRRT     traZODone  25 mg Oral At Bedtime     vancomycin place ornelas - receiving intermittent dosing  1 each Intravenous See Admin Instructions     Continuous Infusions:    peritoneal dialysis with additives 5,000 mL/hr at 08/23/21 1511     peritoneal dialysis with additives       heparin (porcine)       heparin (porcine)       PRN Meds:.sodium chloride 0.9%, sodium chloride 0.9%, acetaminophen, albuterol, albuterol, alteplase, alteplase, sore throat lozenge, budesonide-formoterol, glucose **OR** dextrose **OR** glucagon, gentamicin, HYDROmorphone, lidocaine 4%, lidocaine (buffered or not buffered), lidocaine (buffered or not buffered),  LORazepam, midazolam, naloxone **OR** naloxone **OR** naloxone **OR** naloxone, omeprazole, ondansetron **OR** ondansetron, sodium chloride (PF), sodium chloride (PF), sodium chloride (PF), sodium chloride (PF), traZODone    Objective:  Vital signs in last 24 hours:  Temp:  [97.7  F (36.5  C)-98  F (36.7  C)] 97.8  F (36.6  C)  Pulse:  [55-80] 56  Resp:  [16-22] 18  BP: (118-173)/(59-98) 158/67  SpO2:  [94 %-99 %] 99 %  Weight:   [unfilled]      Intake/Output Summary (Last 24 hours) at 8/27/2021 1124  Last data filed at 8/26/2021 1743  Gross per 24 hour   Intake --   Output 1600 ml   Net -1600 ml       Physical Exam:  General: Not in obvious distress.  HEENT: Normocephalic, supple neck  Chest: Clear to auscultation bilateral anteriorly, no wheezing  Heart: S1S2 normal, regular  Abdomen: Soft. No tenderness appreciated. PD catheter with dressing in place.. Bowel sounds- active.  Extremities: No legs swelling  Neuro: alert and awake, anxious, moves all extremities    Lab Results:(I have personally reviewed the results)    Recent Results (from the past 24 hour(s))   Glucose by meter    Collection Time: 08/26/21 11:43 AM   Result Value Ref Range    GLUCOSE BY METER POCT 135 (H) 70 - 125 mg/dL   Glucose by meter    Collection Time: 08/26/21  5:43 PM   Result Value Ref Range    GLUCOSE BY METER POCT 108 70 - 125 mg/dL   Glucose by meter    Collection Time: 08/26/21  9:13 PM   Result Value Ref Range    GLUCOSE BY METER POCT 159 (H) 70 - 125 mg/dL   Magnesium    Collection Time: 08/27/21 12:00 AM   Result Value Ref Range    Magnesium 1.8 1.8 - 2.6 mg/dL   Comprehensive metabolic panel    Collection Time: 08/27/21  4:49 AM   Result Value Ref Range    Sodium 138 136 - 145 mmol/L    Potassium 3.9 3.5 - 5.0 mmol/L    Chloride 104 98 - 107 mmol/L    Carbon Dioxide (CO2) 22 22 - 31 mmol/L    Anion Gap 12 5 - 18 mmol/L    Urea Nitrogen 20 8 - 22 mg/dL    Creatinine 5.48 (H) 0.70 - 1.30 mg/dL    Calcium 7.3 (L) 8.5 - 10.5 mg/dL     Glucose 80 70 - 125 mg/dL    Alkaline Phosphatase 85 45 - 120 U/L    AST 14 0 - 40 U/L    ALT <9 0 - 45 U/L    Protein Total 6.3 6.0 - 8.0 g/dL    Albumin 2.3 (L) 3.5 - 5.0 g/dL    Bilirubin Total 0.4 0.0 - 1.0 mg/dL    GFR Estimate 10 (L) >60 mL/min/1.73m2   CBC with platelets    Collection Time: 08/27/21  4:49 AM   Result Value Ref Range    WBC Count 8.2 4.0 - 11.0 10e3/uL    RBC Count 3.25 (L) 4.40 - 5.90 10e6/uL    Hemoglobin 9.7 (L) 13.3 - 17.7 g/dL    Hematocrit 31.1 (L) 40.0 - 53.0 %    MCV 96 78 - 100 fL    MCH 29.8 26.5 - 33.0 pg    MCHC 31.2 (L) 31.5 - 36.5 g/dL    RDW 14.0 10.0 - 15.0 %    Platelet Count 124 (L) 150 - 450 10e3/uL   Glucose by meter    Collection Time: 08/27/21  8:28 AM   Result Value Ref Range    GLUCOSE BY METER POCT 90 70 - 125 mg/dL   Glucose by meter    Collection Time: 08/27/21 10:41 AM   Result Value Ref Range    GLUCOSE BY METER POCT 95 70 - 125 mg/dL         Serum Glucose range:   Recent Labs   Lab 08/27/21  1041 08/27/21  0828 08/27/21  0449 08/26/21  2113   GLC 95 90 80 159*     ABG: No lab results found in last 7 days.  CBC:   Recent Labs   Lab 08/27/21  0449 08/26/21  0445 08/25/21  0457 08/24/21  0502 08/23/21  0534 08/22/21  0451   WBC 8.2 7.7 7.4 9.2 11.1* 13.4*   HGB 9.7* 9.7* 9.9* 9.1* 9.6* 9.5*   HCT 31.1* 31.2* 32.3* 29.3* 31.2* 31.0*   MCV 96 95 96 95 97 96   * 158 163 222 212 199   NEUTROPHIL  --   --   --  63 70 79   LYMPH  --   --   --  21 16 12   MONOCYTE  --   --   --  8 6 6   EOSINOPHIL  --   --   --  7 6 2     Chemistry:   Recent Labs   Lab 08/27/21  0449 08/27/21  0000 08/26/21 0445 08/25/21 0457 08/21/21  1124     --  142 138  --    POTASSIUM 3.9  --  3.8 3.9  --    CHLORIDE 104  --  108* 103  --    CO2 22  --  21* 20*  --    BUN 20  --  34* 29*  --    CR 5.48*  --  7.49* 5.98*  --    GFRESTIMATED 10*  --  7* 9*  --    NISREEN 7.3*  --  7.1* 7.3*  --    MAG  --  1.8  --   --   --    PROTTOTAL 6.3  --  6.3 6.4  --    ALBUMIN 2.3*  --  2.3* 2.4*  --     AST 14  --  12 13  --    ALT <9  --  <9 <9  --    ALKPHOS 85  --  85 89  --    BILITOTAL 0.4  --  0.4 0.4  --    JI  --   --   --   --  20     Coags:  Recent Labs   Lab 08/21/21  1551 08/20/21  2245   INR 1.24* 1.11   PTT 37 29     Cardiac Markers:  Recent Labs   Lab 08/21/21  2111   TROPONINI 0.04        XR Chest Port 1 View    Result Date: 8/20/2021  EXAM: XR CHEST PORT 1 VIEW LOCATION: [facility] DATE/TIME: 8/20/2021 10:41 PM INDICATION: Fever COMPARISON: 08/18/2021.     IMPRESSION: Tunneled right internal jugular dialysis catheter terminates in the SVC. Poststernotomy. Normal heart size and pulmonary vascularity. Mild atelectasis or scarring of the lung bases. Lungs otherwise clear. No pleural fluid or pneumothorax.    IR CVC Tunnel Placement > 5 Yrs of Age    Result Date: 8/24/2021  [place] [facility] LOCATION: [facility] DATE: 8/24/2021 PROCEDURES: 1.  TUNNELED DIALYSIS CATHETER PLACEMENT. 2.  ULTRASOUND GUIDANCE. 3.  FLUOROSCOPY. 4.  CONSCIOUS SEDATION. INDICATION:  End-stage renal disease SEDATION: During the time-out, immediately prior to administration of medications, the patient was reassessed for adequacy to receive conscious sedation. Versed 2 mg and Fentanyl 100 mcg were administered intravenously with continuous vital signs monitoring, by the radiology nurse under my direct supervision. Patient was alert and oriented post procedure. Total face-to-face moderate sedation time: 15 min. ADDITIONAL MEDICATION:None. FLUOROSCOPIC TIME: 0.4 mins. DOSE: Air Kerma:16 mGy. STERILE BARRIER TECHNIQUE: Maximum sterile barrier technique was used. Cutaneous antisepsis was performed at the operative site with application of 2% chlorhexidine and a full body sterile drape. Prior to the procedure, the  and assistant performed hand hygiene and wore hat, mask, sterile gown, and sterile gloves during the entire procedure. Ultrasound was prepped with a sterile  probe cover and sterile gel was used. PROCEDURE:  The right side of the neck was prepped and draped in a sterile fashion; 1% local lidocaine was infused into the local soft tissues.  Prior to the procedure, patency of the right internal jugular vein was confirmed with ultrasound and images recorded.  Under real-time ultrasound guidance, the right internal jugular vein was accessed percutaneously, and the guidewire was advanced to the right atrial/superior vena caval junction. A subcutaneous tunnel was created in the anterior chest wall and through this subcutaneous tunnel, a 23 cm dual lumen dialysis catheter was brought and advanced into the peel-away sheath. Under fluoroscopic guidance, the tip of the catheter was positioned at the junction of SVC and right atrium.  Both ports flushed and aspirated without difficulty.  Heparinized flush solution was then infused into both lumens of the dialysis catheter.  Catheter was then secured to the anterior chest wall with 4-0 Prolene stitch.  The neck incision was closed with 4-0 Vicryl.  The patient tolerated the procedure well. RADIOGRAPHIC SUPERVISION INTERPRETATION:  1.  ULTRASOUND GUIDANCE:  Patent right internal jugular vein.  2.  FLUOROSCOPY:  The tip of the dialysis catheter is in the right atrium.     IMPRESSION:  Successful placement of right IJ tunnelled dialysis catheter with fluoroscopic and ultrasound guidance. CPT: 64300 [id number] [id number] The CPT codes are for physician use only.    IR CVC Tunnel Removal Right    Result Date: 8/23/2021  [place] [facility] LOCATION: [facility] DATE: 8/23/2021 PROCEDURE: TUNNELED DIALYSIS CATHETER REMOVAL ATTENDING: Chay Alexandra MD INDICATION: Bacteremia. CONSENT: The risks, benefits, and alternatives of tunneled catheter removal were discussed with the patient in detail. All questions were answered. Informed consent was given to proceed with the procedure. MODERATE SEDATION:  None. ADDITIONAL  MEDICATIONS: 1% lidocaine for local anesthesia. FLUOROSCOPIC TIME: None. CONTRAST: None. UNIVERSAL PRECAUTIONS: The procedure was performed utilizing maximum sterile barrier technique. Prior to the start of the procedure a standard pause for patient safety was performed with site marking as indicated. COMPLICATIONS: No immediate complications. PROCEDURE:  The catheter cuff of the previously placed tunneled catheter was dissected free under local anesthesia. The catheter was removed and inspected. It was found to be removed in its entirety. FINDINGS: The previously placed tunneled catheter was removed in its entirety.     IMPRESSION:  1. Successful tunneled catheter removal. CPT: 91739 The CPT codes are for physician use only.     Abd/pelvis CT,  IV  contrast only TRAUMA / AAA    Result Date: 8/21/2021  EXAM: CT ABDOMEN PELVIS W CONTRAST, CT CHEST PULMONARY EMBOLISM W CONTRAST LOCATION: [facility] DATE/TIME: 8/21/2021 2:20 AM INDICATION: Sepsis, fever, abdominal pain, tachycardia, altered mental status. COMPARISON: Chest radiograph 08/20/2021. CT abdomen and pelvis 10/09/2020. Noncontrast CT chest 11/21/2019. TECHNIQUE: CT chest pulmonary angiogram and routine CT abdomen pelvis with IV contrast. Arterial phase through the chest and venous phase through the abdomen and pelvis. Multiplanar reformats and MIP reconstructions were performed. Dose reduction techniques were used. CONTRAST: isovue 370 100ml FINDINGS: ANGIOGRAM CHEST: No aortic aneurysm or dissection. No pulmonary embolus. LUNGS AND PLEURA: Compared to 11/21/2019, there has been progression in reticulonodular interstitial infiltrate which affects all pulmonary lobes. The findings are most pronounced in the upper lobes, right more than left. Chronic subpleural scarring in the right upper lobe. No pleural fluid or pneumothorax. MEDIASTINUM/AXILLAE: Poststernotomy. A few top normal sized mediastinal lymph nodes. CORONARY ARTERY  CALCIFICATION: Severe. HEPATOBILIARY: Small right hepatic cyst. Normal gallbladder and bile ducts. PANCREAS: Normal. SPLEEN: Normal. ADRENAL GLANDS: Normal. KIDNEYS/BLADDER: Tiny benign left renal cysts. Normal right kidney and bladder. BOWEL: Normal. LYMPH NODES: Normal. VASCULATURE: Mild aortoiliac atherosclerosis. PELVIC ORGANS: Normal. MUSCULOSKELETAL: Peritoneal dialysis catheter enters the ventral pelvic wall and terminates in the intraperitoneal space of the central pelvis superior to the urinary bladder. Stable 3.2 cm calcified nodule in the soft tissues medial to the left glenohumeral joint has a benign appearance (image 26 of series 2). 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 Degenerative changes of the spine.     IMPRESSION: 1.  No pulmonary embolus. 2.  Compared to 11/21/2019, progression in chronic bilateral reticulonodular interstitial infiltrates likely on the basis of atypical small airway infection such as OPAL. 3.  No acute process in the abdomen or pelvis.    CT Chest Pulmonary Embolism w Contrast    Result Date: 8/21/2021  EXAM: CT ABDOMEN PELVIS W CONTRAST, CT CHEST PULMONARY EMBOLISM W CONTRAST LOCATION: [facility] DATE/TIME: 8/21/2021 2:20 AM INDICATION: Sepsis, fever, abdominal pain, tachycardia, altered mental status. COMPARISON: Chest radiograph 08/20/2021. CT abdomen and pelvis 10/09/2020. Noncontrast CT chest 11/21/2019. TECHNIQUE: CT chest pulmonary angiogram and routine CT abdomen pelvis with IV contrast. Arterial phase through the chest and venous phase through the abdomen and pelvis. Multiplanar reformats and MIP reconstructions were performed. Dose reduction techniques were used. CONTRAST: isovue 370 100ml FINDINGS: ANGIOGRAM CHEST: No aortic aneurysm or dissection. No pulmonary embolus. LUNGS AND PLEURA: Compared to 11/21/2019, there has been progression in reticulonodular interstitial infiltrate which affects  all pulmonary lobes. The findings are most pronounced in the upper lobes, right more than left. Chronic subpleural scarring in the right upper lobe. No pleural fluid or pneumothorax. MEDIASTINUM/AXILLAE: Poststernotomy. A few top normal sized mediastinal lymph nodes. CORONARY ARTERY CALCIFICATION: Severe. HEPATOBILIARY: Small right hepatic cyst. Normal gallbladder and bile ducts. PANCREAS: Normal. SPLEEN: Normal. ADRENAL GLANDS: Normal. KIDNEYS/BLADDER: Tiny benign left renal cysts. Normal right kidney and bladder. BOWEL: Normal. LYMPH NODES: Normal. VASCULATURE: Mild aortoiliac atherosclerosis. PELVIC ORGANS: Normal. MUSCULOSKELETAL: Peritoneal dialysis catheter enters the ventral pelvic wall and terminates in the intraperitoneal space of the central pelvis superior to the urinary bladder. Stable 3.2 cm calcified nodule in the soft tissues medial to the left glenohumeral joint has a benign appearance (image 26 of series 2). 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 Degenerative changes of the spine.     IMPRESSION: 1.  No pulmonary embolus. 2.  Compared to 11/21/2019, progression in chronic bilateral reticulonodular interstitial infiltrates likely on the basis of atypical small airway infection such as OPAL. 3.  No acute process in the abdomen or pelvis.    CT Head w/o Contrast    Result Date: 8/21/2021  EXAM: CT HEAD WITHOUT CONTRAST LOCATION: [facility] DATE/TIME: 08/21/2021, 12:54 PM INDICATION: Mental status change, unknown cause. COMPARISON: Head CT 09/30/2018 and brain MR 03/02/2018. TECHNIQUE: Routine CT Head without IV contrast. Multiplanar reformats. Dose reduction techniques were used. FINDINGS: INTRACRANIAL CONTENTS: This study was performed post administration of intravenous contrast material. This slightly limits evaluation for intracranial hemorrhage, although no such intracranial hemorrhage is identified. No abnormal enhancement.  No intracranial hemorrhage, extra-axial collection, or mass effect.  No CT evidence of acute infarct. Moderate presumed chronic small vessel ischemic changes. Mild generalized volume loss. No hydrocephalus. VISUALIZED ORBITS/SINUSES/MASTOIDS: Prior bilateral cataract surgery. Visualized portions of the orbits are otherwise unremarkable. No paranasal sinus mucosal disease. No middle ear or mastoid effusion. BONES/SOFT TISSUES: No acute abnormality.     IMPRESSION: 1.  No CT evidence for acute intracranial process. 2.  Brain atrophy and presumed chronic microvascular ischemic changes as above. 3.  Study performed after administration of intravenous contrast material. While this slightly limits evaluation for intracranial hemorrhage, no such intracranial hemorrhage is identified.       Latest radiology report personally reviewed.    Note created using dragon voice recognition software so sounds alike errors may have escaped editing.    08/27/2021   MAHAD LEE MD  HOSPITALIST, [facility]  [facility] NO. [phone]

## 2021-08-27 NOTE — PLAN OF CARE
Pt given PRN dilaudid x1 for c/o +6 R shoulder incisional pain with good relief. Also c/o painful muscle cramping in his hands and abdomen. He questioned his Mag level as he takes magnesium daily at home. Mag ordered and resulted at 1.8. Calcium low but improved this morning at 7.3. Pt has been NPO since 2400 to have PD catheter moved or replaced.

## 2021-08-27 NOTE — ANESTHESIA PREPROCEDURE EVALUATION
"Anesthesia Pre-Procedure Evaluation    Patient: Dylon Trevizo   MRN: [id number] : 1950        Preoperative Diagnosis: Sepsis (H) [A41.9]  Acute organ dysfunction due to systemic inflammatory response syndrome (H) [R65.11]   Procedure : Procedure(s):  LAPAROSCOPY,  REVISION PERITONEAL DIALYSIS CATHETER     Past Medical History:   Diagnosis Date     Anxiety and depression      Asthma, severe persistent, poorly-controlled      ESRD (end stage renal disease) on dialysis (H)      HLD (hyperlipidemia)      Hypertension      IVY (obstructive sleep apnea)      Type 2 diabetes mellitus (H) 1980      Past Surgical History:   Procedure Laterality Date     APPENDECTOMY       APPENDECTOMY       [facility]     HEAD & NECK SURGERY  as child    \"tumor removed x 2\"     IR CVC TUNNEL PLACEMENT > 5 YRS OF AGE  2021     IR CVC TUNNEL REMOVAL RIGHT  2021     LA LAP INSERTION TUNNELED INTRAPERITONEAL CATHETER N/A 10/11/2019    Procedure: INSERTION, CATHETER, PERITONEAL, LAPAROSCOPIC, LYSIS OF ADHESIONS;  Surgeon: Barrie Britt MD;  Location: [facility];  Service: General     THORACIC SURGERY        Allergies   Allergen Reactions     Ace Inhibitors Unknown and Other (See Comments)     Hyperkalemia  Hyperkalemia  hyperkalemia       Losartan Unknown and Other (See Comments)     hyperkalemia  Hyperkalemia  hyperkalemia       Tramadol Other (See Comments) and Shortness Of Breath     SOB  Dyspnea       Aminophylline Nausea and Vomiting and Unknown     Nausea/vomiting       Folic Acid-Vit B6-Vit B12 Unknown     Unknown  Other reaction(s): Unknown  Unknown       Sulfamethoxazole-Trimethoprim Nausea and Vomiting and Nausea     Nausea/vomiting       Theophylline Nausea and Vomiting     Nausea/vomiting       Vitamin B12 Unknown     unknown  Other reaction(s): *Unknown       Ceftriaxone Rash     Came in with strep, rash after eeceiving rocephin, localized to his back only-c/o of " itching  Came in with strep, rash after eeceiving rocephin, localized to his back only-c/o of itching  Rash/itching  Came in with strep, rash after eeceiving rocephin, localized to his back only-c/o of itching  Rash/itching  Came in with strep, rash after eeceiving rocephin, localized to his back only-c/o of itching  Tolerated cefazolin 10/2018  Came in with strep, rash after eeceiving rocephin, localized to his back only-c/o of itching       Clavulanic Acid Nausea and Rash     aka  AUGMENTIN  (NAUSEA)  aka  AUGMENTIN  (NAUSEA)  Unknown  aka  AUGMENTIN  (NAUSEA)  Unknown  aka  AUGMENTIN  (NAUSEA)  Other reaction(s): Nausea Only  aka  AUGMENTIN  (NAUSEA)  aka  AUGMENTIN  (NAUSEA)       Hydralazine Muscle Pain (Myalgia) and Rash     Muscle aches, severe cramps, constant, felt like RA in hands  Muscle aches; severe cramps, felt like RA in hands, constant   myalgia        Social History     Tobacco Use     Smoking status: Former Smoker     Quit date: 1999     Years since quittin.6     Smokeless tobacco: Never Used   Substance Use Topics     Alcohol use: Yes     Comment: Not currently      Wt Readings from Last 1 Encounters:   21 100 kg (220 lb 8 oz)        Anesthesia Evaluation   Pt has had prior anesthetic. Type: General.    No history of anesthetic complications       ROS/MED HX  ENT/Pulmonary:     (+) sleep apnea, tobacco use, Past use, asthma     Neurologic:  - neg neurologic ROS     Cardiovascular:     (+) hypertension-----    METS/Exercise Tolerance: >4 METS    Hematologic:  - neg hematologic  ROS     Musculoskeletal:  - neg musculoskeletal ROS     GI/Hepatic:  - neg GI/hepatic ROS     Renal/Genitourinary:     (+) renal disease (Last dialyzed yesterday), type: ESRD, Pt requires dialysis, type: Hemodialysis,     Endo:     (+) type II DM, Not using insulin, Obesity (BMI 32),     Psychiatric/Substance Use:     (+) psychiatric history anxiety and depression     Infectious Disease: Comment: Sepsis       Malignancy:       Other:            Physical Exam    Airway        Mallampati: II   TM distance: > 3 FB   Neck ROM: full   Mouth opening: > 3 cm    Respiratory Devices and Support         Dental  no notable dental history         Cardiovascular   cardiovascular exam normal          Pulmonary   pulmonary exam normal                OUTSIDE LABS:  CBC:   Lab Results   Component Value Date    WBC 8.2 08/27/2021    WBC 7.7 08/26/2021    HGB 9.7 (L) 08/27/2021    HGB 9.7 (L) 08/26/2021    HCT 31.1 (L) 08/27/2021    HCT 31.2 (L) 08/26/2021     (L) 08/27/2021     08/26/2021     BMP:   Lab Results   Component Value Date     08/27/2021     08/26/2021    POTASSIUM 3.9 08/27/2021    POTASSIUM 3.8 08/26/2021    CHLORIDE 104 08/27/2021    CHLORIDE 108 (H) 08/26/2021    CO2 22 08/27/2021    CO2 21 (L) 08/26/2021    BUN 20 08/27/2021    BUN 34 (H) 08/26/2021    CR 5.48 (H) 08/27/2021    CR 7.49 (HH) 08/26/2021    GLC 90 08/27/2021    GLC 80 08/27/2021     COAGS:   Lab Results   Component Value Date    PTT 37 08/21/2021    INR 1.24 (H) 08/21/2021    FIBR 880 (H) 08/25/2021     POC: No results found for: BGM, HCG, HCGS  HEPATIC:   Lab Results   Component Value Date    ALBUMIN 2.3 (L) 08/27/2021    PROTTOTAL 6.3 08/27/2021    ALT <9 08/27/2021    AST 14 08/27/2021    ALKPHOS 85 08/27/2021    BILITOTAL 0.4 08/27/2021    JI 20 08/21/2021     OTHER:   Lab Results   Component Value Date    PH 7.40 02/26/2018    LACT 0.6 (L) 08/22/2021    A1C 5.7 (H) 08/21/2021    NISREEN 7.3 (L) 08/27/2021    PHOS 3.1 03/04/2018    MAG 1.8 08/27/2021    LIPASE 41 08/20/2021    TSH 0.74 08/20/2021    CRP 16.4 (H) 08/25/2021       Anesthesia Plan    ASA Status:  4   NPO Status:  NPO Appropriate    Anesthesia Type: General.     - Airway: ETT   Induction: Intravenous.           Consents    Anesthesia Plan(s) and associated risks, benefits, and realistic alternatives discussed. Questions answered and patient/representative(s)  expressed understanding.     - Discussed with:  Patient      - Extended Intubation/Ventilatory Support Discussed: No.      - Patient is DNR/DNI Status: No    Use of blood products discussed: Yes.     - Discussed with: Patient.     - Consented: consented to blood products            Reason for refusal: other.     Postoperative Care       PONV prophylaxis: Ondansetron (or other 5HT-3)     Comments:                Alix Montero MD

## 2021-08-27 NOTE — PROGRESS NOTES
Care Management Follow Up    Length of Stay (days): 6    Expected Discharge Date: 08/28/2021     Concerns to be Addressed:     Medical Progression  Patient plan of care discussed at interdisciplinary rounds: Yes    Anticipated Discharge Disposition:  Home with family     Anticipated Discharge Services:  None  Anticipated Discharge DME:  Unknown    Patient/family educated on Medicare website which has current facility and service quality ratings:  NA  Education Provided on the Discharge Plan:  Yes  Patient/Family in Agreement with the Plan:  Yes    Referrals Placed by CM/SW:  None  Private pay costs discussed: NA    Additional Information:    Chart Reviewed.  CM following Pt's care progression.  No CM needs identified at this time, Pt plans to discharge home with support from significant other upon medical readiness.  Significant other to transport.       IGNACIO Mackay

## 2021-08-27 NOTE — PROGRESS NOTES
"CLINICAL NUTRITION SERVICES - REASSESSMENT NOTE     Nutrition Prescription    RECOMMENDATIONS FOR MDs/PROVIDERS TO ORDER:  None    Malnutrition Status:    Nonsevere    Recommendations already ordered by Registered Dietitian (RD):  Nepro two times a day with meals  Suplena daily with meals    Future/Additional Recommendations:  Adjust supplements pending PO/patient preference     EVALUATION OF THE PROGRESS TOWARD GOALS   Diet:Low Fat, Moderate Consistent Carbohydrate and Dialysis  Nutrition Support: none  Intake: Patient is eating 100% of meals per nursing records and ordering on average 1155kcals and 48g protein daily.      NEW FINDINGS   Patient stated he has been eating < 50% of usual intake for 2-3 weeks and usually eats 1 meal a day.     Patient noticed a weight gain and usually weighs between 216-221lbs.    Patient stated he used to have a list of foods he was intolerant to, but cannot remember what they are.     ANTHROPOMETRICS  Height: 177.8 cm (5' 10\")  Most Recent Weight: 100.8 kg (222 lb 4.8 oz)    IBW: 73 kg  BMI: Obesity Grade I BMI 30-34.9  Weight History:   08/26/21 0448 100.8 kg (222 lb 4.8 oz)   08/25/21 0418 100.5 kg (221 lb 9.6 oz)   08/24/21 0700 101.9 kg (224 lb 11.2 oz)   08/23/21 1520 102.5 kg (226 lb 1 oz)   08/23/21 0943 102.5 kg (226 lb 1 oz)   08/22/21 0630 102.2 kg (225 lb 3.2 oz)   08/21/21 1322 100 kg (220 lb 6.4 oz)   08/20/21 2215 100 kg (220 lb 7.4 oz)     ASSESSED NUTRITION NEEDS  Estimated Energy Needs: 2606-0868 kcals/day (25 - 30 kcals/kg)  Justification: Obese  Estimated Protein Needs:  grams protein/day (1.2 - 1.3 grams of pro/kg)  Justification: Dialysis  Estimated Fluid Needs:1000ml + Output daily (or 1000-2000ml daily w/ PD Only)   Justification: HD    PHYSICAL FINDINGS  See malnutrition section below.  Arm bruising was noted.      GI CONCERNS  Diarrhea    LABS  Reviewed: BUN 34, Cr 7.49, GFR 7, alb 2.3    MEDICATIONS  Reviewed: lasix, culturelle, zosyn, renvela, " prilosec     MALNUTRITION  % Intake: < 75% for > 7 days (non-severe)  % Weight Loss: None noted  Subcutaneous Fat Loss: Upper arm:  mild  Muscle Loss: Temporal:  mild, Thoracic region (clavicle, acromium bone, deltoid, trapezius, pectoral):  moderate, Dorsal hand:  moderatemo and Upper leg (quadricep, hamstring):  moderate  Fluid Accumulation/Edema: Mild to moderate  Malnutrition Diagnosis: Non-severe malnutrition in the context of acute on chronic illness    Previous Goals   Encourage inake  Patient to consume % of nutritionally adequate meals three times per day, or the equivalent with supplements/snacks.  Total avg nutritional intake to meet a minimum of 25 kcal/kg and 1.2 g PRO/kg daily (per dosing wt 80.2 kg).     Evaluation: Not met    Previous Nutrition Diagnosis  Altered nutrition-related laboratory values related to ESRD and sepsis as evidenced by  BUN 48, Cr 8.50, GFR 6, alb 2.3, CRP 16.7  Evaluation: Improving    CURRENT NUTRITION DIAGNOSIS  Altered nutrition-related laboratory values rt ESRD as evidenced by BUN 34, Cr 7.49, GFR 7   Malnutrition related to sepsis on ESRD as evidenced by prolonged poor intake of < 75% estimated energy needs for 2-3 weeks, mild subcutaneous fat loss, and mild to moderate muscle loss.       INTERVENTIONS  Implementation  Medical food supplement therapy    Goals  Patient to consume % of nutritionally adequate meals three times per day, or the equivalent with supplements/snacks.    Total avg nutritional intake to meet a minimum of 25 kcal/kg and 1.2 g PRO/kg daily (per dosing wt 80.2 kg).    Monitoring/Evaluation  Progress toward goals will be monitored and evaluated per protocol. PO intake, supplement tolerance, wt, labs

## 2021-08-27 NOTE — PROGRESS NOTES
Pt resting 3 LPM NC. BS: Dim with fine crackles in BAY and expiratory wheezes in RUL pre neb, Increased aeration, dim with faint scattered crackles t/o post. SATs 97%, HR 70, RR 22. BID Duo neb given. RT to follow.

## 2021-08-27 NOTE — PLAN OF CARE
Problem: Adult Inpatient Plan of Care  Goal: Patient-Specific Goal (Individualized)  Outcome: No Change     Has been Npo since midnight awaiting surgery on peritoneal dialysis catheter, he has refused several medications because he does not take them on an empty stomach, states he does not want dialysis until after he has surgery because he doesn't feel comfortable doing it on an empty stomach. He has refused heparin because his stomach has been so tender to the touch.

## 2021-08-27 NOTE — PROGRESS NOTES
"Patient extubated by PACU team patient currently resting on simple mask with Sp02 of 99%. RT to follow as needed.    /57   Pulse 65   Temp 97.8  F (36.6  C) (Temporal)   Resp 20   Ht 1.778 m (5' 10\")   Wt 100 kg (220 lb 8 oz)   SpO2 97%   BMI 31.64 kg/m      "

## 2021-08-27 NOTE — ANESTHESIA CARE TRANSFER NOTE
Patient: Dylon Trevizo    Procedure(s):  LAPAROSCOPY,  REVISION PERITONEAL DIALYSIS CATHETER    Diagnosis: Sepsis (H) [A41.9]  Acute organ dysfunction due to systemic inflammatory response syndrome (H) [R65.11]  Diagnosis Additional Information: No value filed.    Anesthesia Type:   General     Note:    Oropharynx: endotracheal tube in place and ventilatory support  Level of Consciousness: drowsy      Independent Airway: airway patency not satisfactory and stable  Dentition: dentition unchanged  Vital Signs Stable: post-procedure vital signs reviewed and stable  Report to RN Given: handoff report given  Patient transferred to: PACU    Handoff Report: Identifed the Patient, Identified the Reponsible Provider, Reviewed the pertinent medical history, Discussed the surgical course, Reviewed Intra-OP anesthesia mangement and issues during anesthesia, Set expectations for post-procedure period and Allowed opportunity for questions and acknowledgement of understanding      Vitals:  Vitals Value Taken Time   /82 08/27/21 1415   Temp     Pulse 59 08/27/21 1415   Resp 23 08/27/21 1415   SpO2 100 % 08/27/21 1415   Vitals shown include unvalidated device data.    Electronically Signed By: RUTHY Gamboa CRNA  August 27, 2021  2:16 PM

## 2021-08-27 NOTE — OP NOTE
[facility] General Surgery Operative Note    Pre-operative diagnosis: Sepsis (H) [A41.9]  Acute organ dysfunction due to systemic inflammatory response syndrome (H) [R65.11]   Post-operative diagnosis: Same   Procedure: Procedure(s):  LAPAROSCOPY,  REVISION PERITONEAL DIALYSIS CATHETER    Surgeon: Brennan Foley MD   Assistant(s): None   Anesthesia: Choice    Estimated blood loss: * No values recorded between 8/27/2021  1:24 PM and 8/27/2021  1:44 PM *   Drains: None   Specimens: * No specimens in log *    Implants: None   Findings:  PD catheter.  Buried in the small intestine deep in the pelvis.  Omental adhesions to midline pelvis   Complications: None   Condition: Stable   Procedure Details:  After informed obtained of the patient he was brought operating suite placed operating table.  General anesthesia induced and his eye was prepped and draped in the sterile manner.  A pneumoperitoneum was created using the previously placed PD catheter.  I then inserted ultimately 3 trochars in the left abdomen.  The PD catheter was buried in the small intestine toward the bladder.  There was adhesions to the anterior abdominal wall mostly omentum and some small intestine low midline.  These were taken down the catheter freed and then anchored to the anterior abdominal wall to the right of midline.  This was done with 2 separate stitches using 0 Surgidac suture.  The catheter laid nicely is no longer touching the bladder.  I assured hemostasis deflate the abdomen through the trochars and closed with 4-0 Vicryl and Steri-Strips.  Marcaine was injected.  Dressings applied       Brennan Foley MD

## 2021-08-27 NOTE — PROGRESS NOTES
"Patient came to Pacu from OR and was asked to be placed on a ventilator on pressure support. Patient is maintaining on 10/5 but has gone apneic and trigger back up ventilation x2 . RT to follow.    BP (!) 176/74   Pulse 61   Temp 97.8  F (36.6  C) (Temporal)   Resp 18   Ht 1.778 m (5' 10\")   Wt 100 kg (220 lb 8 oz)   SpO2 95%   BMI 31.64 kg/m      "

## 2021-08-27 NOTE — PLAN OF CARE
Problem: Adult Inpatient Plan of Care  Goal: Plan of Care Review  8/27/2021 1848 by Hattie Fox, RN  Outcome: No Change  8/27/2021 1121 by Hattie Fox, RN  Outcome: No Change  Flowsheets (Taken 8/27/2021 1121)  Plan of Care Reviewed With: patient     Back from surgery, states feeling very hungry, normal diet restarted and tolerated without problems

## 2021-08-27 NOTE — PROGRESS NOTES
RENAL PROGRESS NOTE    CC:  ESKD, fever, confusion    ASSESSMENT & PLAN:   ESRD - was on PD, exit and tunnel infx, PD cath removed, then replaced 7/29. Now on temporary IHD via CVC. Chemistries and volume looks controlled.   Recs:  - s/p PD cath repositioning, I'm hopeful this will help his drain pain significantly  - Electrolytes and volume look fine, he doesn't need dialysis again until Monday  - I spoke with his home unit, plan for HD Monday and then check in with PD nurses later next week and then resume PD cycling with low volumes next Friday or after that weekend     Fever - inc wbc, c/o pain (among other c/o) from CVC. Agree CVC is a likely source although negative cultures.  S/p removal and replacement.  No growth on cultures  Continues on antibiotics.  ID signed off, no need for antibiotics on discharge per ID    Lung infiltrates - he's had prominent recurrent c/o sob and he is not volume overloaded and had inc reticulonodular infiltrates. He's taken a lot of prednisone. ID considering OPAL and I discussed with them and further testing pending    Hypertension - trending more hypertensive, on coreg and lasix alone.       ACD on epo 20K weekly at PD clinic, hgb in the 9s currently     Hyper PTH on renal diet and binders. renvela     Depression, anxiety - on sertraline and other meds.  Very anxious, driving some of his outburst/anger I think.     Chris Westbrook  [facility]  [phone]                SUBJECTIVE:  Seen after surgery today.  Feeling okay, some post-operative pain.  He feels well, I think he can wait until Monday for dialysis.  I called his home unit and updated them regarding the plan.  Spoke with dr. Arango today and i'm okay with discharge tonight, if he stays he can leave in the morning with no dialysis.    OBJECTIVE:  Physical Exam   Temp: 98.1  F (36.7  C) Temp src: Oral BP: 103/59 Pulse: 54   Resp: 16 SpO2: 98 % O2 Device: Nasal cannula Oxygen Delivery: 3  LPM  Vitals:    08/25/21 0418 08/26/21 0448 08/27/21 0629   Weight: 100.5 kg (221 lb 9.6 oz) 100.8 kg (222 lb 4.8 oz) 100 kg (220 lb 8 oz)     Vital Signs with Ranges  Temp:  [97.7  F (36.5  C)-98.5  F (36.9  C)] 98.1  F (36.7  C)  Pulse:  [51-80] 54  Resp:  [10-23] 16  BP: ()/(50-91) 103/59  FiO2 (%):  [30 %] 30 %  SpO2:  [95 %-100 %] 98 %  I/O last 3 completed shifts:  In: 250 [I.V.:250]  Out: 1600 [Other:1600]    @TMAXR(24)@    Patient Vitals for the past 72 hrs:   Weight   08/27/21 0629 100 kg (220 lb 8 oz)   08/26/21 0448 100.8 kg (222 lb 4.8 oz)   08/25/21 0418 100.5 kg (221 lb 9.6 oz)       Intake/Output Summary (Last 24 hours) at 8/22/2021 0920  Last data filed at 8/22/2021 0630  Gross per 24 hour   Intake 1370 ml   Output 400 ml   Net 970 ml       PHYSICAL EXAM:  General - Alert and oriented x3, appears comfortable, NAD  Neck: RIJ CVC, replaced, no evidence of infection  Cardiovascular - Regular rate and rhythm, no rub  Respiratory - Clear to auscultation bilaterally, no crackles or wheezes  Abd: BS present, PD cath nontender, no peritoneal signs  Extremities - No lower extremity edema bilaterally  Skin: dry, intact, no rash, good turgor  Neuro:  Grossly intact, no focal deficits  MSK:  Grossly intact  Psych: calm today    LABORATORY STUDIES:     Recent Labs   Lab 08/27/21  0449 08/26/21  0445 08/25/21  0457 08/24/21  0502 08/23/21  0534 08/22/21  0451   WBC 8.2 7.7 7.4 9.2 11.1* 13.4*   RBC 3.25* 3.30* 3.38* 3.07* 3.23* 3.22*   HGB 9.7* 9.7* 9.9* 9.1* 9.6* 9.5*   HCT 31.1* 31.2* 32.3* 29.3* 31.2* 31.0*   * 158 163 222 212 199       Basic Metabolic Panel:  Recent Labs   Lab 08/27/21  1637 08/27/21  1429 08/27/21  1303 08/27/21  1041 08/27/21  0828 08/27/21  0449 08/26/21  0445 08/25/21  0457 08/24/21  0502 08/23/21  0534 08/22/21  0451   NA  --   --   --   --   --  138 142 138 141 141 139   POTASSIUM  --   --   --   --   --  3.9 3.8 3.9 4.3 4.5 4.3   CHLORIDE  --   --   --   --   --  104 108*  103 109* 109* 107   CO2  --   --   --   --   --  22 21* 20* 19* 18* 21*   BUN  --   --   --   --   --  20 34* 29* 48* 47* 51*   CR  --   --   --   --   --  5.48* 7.49* 5.98* 8.50* 7.76* 7.54*    122 84 95 90 80 109 81 103 107 110   NISREEN  --   --   --   --   --  7.3* 7.1* 7.3* 6.8* 7.2* 7.1*       INR  Recent Labs   Lab 08/21/21  1551 08/20/21 2245   INR 1.24* 1.11        Recent Labs   Lab Test 08/27/21  0449 08/26/21  0445 08/21/21  1551 08/20/21 2245   INR  --   --  1.24* 1.11   WBC 8.2 7.7  --  17.6*   HGB 9.7* 9.7*  --  11.5*   * 158  --  225       Personally reviewed current labs

## 2021-08-28 VITALS
SYSTOLIC BLOOD PRESSURE: 150 MMHG | HEIGHT: 70 IN | DIASTOLIC BLOOD PRESSURE: 94 MMHG | TEMPERATURE: 98.3 F | RESPIRATION RATE: 18 BRPM | OXYGEN SATURATION: 96 % | BODY MASS INDEX: 31.57 KG/M2 | WEIGHT: 220.5 LBS | HEART RATE: 83 BPM

## 2021-08-28 LAB — GLUCOSE BLDC GLUCOMTR-MCNC: 115 MG/DL (ref 70–125)

## 2021-08-28 PROCEDURE — 250N000013 HC RX MED GY IP 250 OP 250 PS 637: Performed by: FAMILY MEDICINE

## 2021-08-28 PROCEDURE — 99239 HOSP IP/OBS DSCHRG MGMT >30: CPT | Performed by: INTERNAL MEDICINE

## 2021-08-28 PROCEDURE — 250N000013 HC RX MED GY IP 250 OP 250 PS 637: Performed by: INTERNAL MEDICINE

## 2021-08-28 PROCEDURE — 999N000157 HC STATISTIC RCP TIME EA 10 MIN

## 2021-08-28 PROCEDURE — 94640 AIRWAY INHALATION TREATMENT: CPT | Mod: 76

## 2021-08-28 PROCEDURE — 250N000009 HC RX 250: Performed by: FAMILY MEDICINE

## 2021-08-28 RX ORDER — HYDROMORPHONE HYDROCHLORIDE 2 MG/1
2 TABLET ORAL EVERY 6 HOURS PRN
Qty: 6 TABLET | Refills: 0 | Status: SHIPPED | OUTPATIENT
Start: 2021-08-28

## 2021-08-28 RX ORDER — BUDESONIDE AND FORMOTEROL FUMARATE DIHYDRATE 160; 4.5 UG/1; UG/1
1 AEROSOL RESPIRATORY (INHALATION) 2 TIMES DAILY
Refills: 0
Start: 2021-08-28

## 2021-08-28 RX ADMIN — FUROSEMIDE 40 MG: 20 TABLET ORAL at 08:32

## 2021-08-28 RX ADMIN — HYDROMORPHONE HYDROCHLORIDE 2 MG: 2 TABLET ORAL at 06:34

## 2021-08-28 RX ADMIN — SEVELAMER CARBONATE 1600 MG: 800 TABLET, FILM COATED ORAL at 08:32

## 2021-08-28 RX ADMIN — ATORVASTATIN CALCIUM 10 MG: 10 TABLET, FILM COATED ORAL at 08:33

## 2021-08-28 RX ADMIN — IPRATROPIUM BROMIDE AND ALBUTEROL SULFATE 3 ML: .5; 3 SOLUTION RESPIRATORY (INHALATION) at 09:29

## 2021-08-28 RX ADMIN — CARVEDILOL 25 MG: 12.5 TABLET, FILM COATED ORAL at 08:32

## 2021-08-28 RX ADMIN — Medication 1 CAPSULE: at 08:33

## 2021-08-28 RX ADMIN — SERTRALINE HYDROCHLORIDE 50 MG: 50 TABLET ORAL at 08:32

## 2021-08-28 RX ADMIN — CETIRIZINE HYDROCHLORIDE 10 MG: 10 TABLET ORAL at 08:32

## 2021-08-28 NOTE — DISCHARGE SUMMARY
[facility] MEDICINE  DISCHARGE SUMMARY     Primary Care Physician: Mc Wolfe  Admission Date: 8/20/2021   Discharge Provider: Nba DUONG MD Discharge Date: 8/28/2021   Diet:   Active Diet and Nourishment Order   Procedures     Snacks/Supplements Adult: Nepro Oral Supplement; With Meals     Snacks/Supplements Adult: Suplena Oral Supplement; With Meals     Consistent Carbohydrate Diet Consistent Carb 60 grams CHO per Meal Diet     Diet       Code Status: Full Code   Activity: DCACTIVITY: Activity as tolerated        Condition at Discharge: Good     REASON FOR PRESENTATION(See Admission Note for Details)   Altered mental status, fever    PRINCIPAL & ACTIVE DISCHARGE DIAGNOSES     Principal Problem:    Sepsis without acute organ dysfunction, due to unspecified organism (H)  Active Problems:    Sinus tachycardia    End-stage renal disease on hemodialysis (H)    Fever, unspecified fever cause    Leukocytosis, unspecified type      PENDING LABS     Unresulted Labs Ordered in the Past 30 Days of this Admission     Date and Time Order Name Status Description    8/23/2021  3:16 PM Bronchopulm Aspergillosis Allergic Panel In process     8/21/2021 12:54 PM Acid-Fast Bacilli Culture and Stain with AFB Stain Preliminary             PROCEDURES ( this hospitalization only)      Procedure(s):  LAPAROSCOPY,  REVISION PERITONEAL DIALYSIS CATHETER    RECOMMENDATIONS TO OUTPATIENT PROVIDER FOR F/U VISIT     Follow-up Appointments     Follow-up and recommended labs and tests       Follow up with primary care provider, MC WOLFE, within 7 days for   hospital follow- up.  The following labs/tests are recommended: CBC, BMP.    Please also review patient's home anti-psychotic medications (no   dose/frequency on Zyprexa and haldol  prior to admission and held on   discharge)    Patient advised to follow-up with primary nephrologist as recommended.               DISPOSITION     Home    SUMMARY OF  "HOSPITAL COURSE:      Dylon Trevizo is a 70 year old male with past medical history of anxiety and depression, Asthma, severe persistent, poorly-controlled (1957), ESRD (end stage renal disease) on dialysis , HLD, Hypertension, IVY (obstructive sleep apnea), and Type 2 diabetes mellitus brought to to ED for evaluation of altered mental status and fever and admitted for further management      Suspected sepsis;  improved.  Likely from infected dialysis catheter per ID  On admission with fever of 104.7 F and leukocytosis of 17.6.  --Patient has an infection to peritoneal dialysis port recently and has been on antibiotics. PD cath removed, then replaced 7/29. Patient on temporary HD via CVC.    --Tunneled catheter in place,  previous removed 8/23   --On IV zosyn and IV vanco and antibiotics discontinued on 8/27 by ID provider.  --PD catheter repositioned by surgeon, Dr. Foley  on 8/27.  Patient reported postoperative pain and continue pain management with as needed tylenol and Dilaudid (reported does not have Dilaudid at home and prescribed 6 tablets of Dilaudid).     ESRD on dialysis;  --MWF dialysis.  Appreciated nephrology input.     Acute metabolic encephalopathy;  resolved.  Likely due to sepsis.   --ABG was unremarkable   --CT brain came back unremarkable  --vitamin B12, ammonia, and TSH within normal limit.,   --urine tox + for MJ only  --Completed antibiotics.     Suspect OPAL;  -CT chest shows: \"Compared to 11/21/2019, progression in chronic bilateral reticulonodular interstitial infiltrates likely on the basis of atypical small airway infection such as OPAL.\"  -ID consulted---> \"suggesting chronic infection such as OPAL. Not related to current problems. He lacks productive cough and otherwise pulmonary symptoms are related to fluid status. Doubt this is a significant findings at this point. Can test sputum if he is able but would not pursue bronchoscopy at this point. Can monitor on serial CT over months " "to years. TB not suspected, no need for respiratory isolation.\"     History of GERD  -c/w home protonix     Hx of depression  -c/w home medications.  On PTA meds no dose frequency for Haldol and Zyprexa, held and advised PCP to review medications on follow-up after discharge within a week.     Hx of asthma  -was recently being treated for asthma exacerbation with prednisone, did not required prednisone during hospitalization, home prednisone discontinued.  -c/w home symbicort, home duoneb and home albuterol      Hx of chronic pain  --Home medication includes as needed Tylenol and Dilaudid.  Patient requesting Lyrica, recommended to discuss with PCP after discharge.     HTN/CAD  -last echo showed LVEF of 55-60%  -BP and HR stable  -c/w home lasix and coreg with hold parameters     HLD  -c/w home lipitor     Hx of DM  -from current med list, on sliding scale insulin only at home  -HgbA1c 5.7 8/21/2021     Anemia  -Hgb at baseline which is 9-10  -monitor daily       Discharge Medications with Med changes:     Current Discharge Medication List      CONTINUE these medications which have CHANGED    Details   budesonide-formoterol (SYMBICORT) 160-4.5 MCG/ACT Inhaler Inhale 1 puff into the lungs 2 times daily  Refills: 0    Associated Diagnoses: Poorly controlled severe persistent asthma without complication      HYDROmorphone (DILAUDID) 2 MG tablet Take 1 tablet (2 mg) by mouth every 6 hours as needed for moderate to severe pain  Qty: 6 tablet, Refills: 0    Associated Diagnoses: Acute post-operative pain         CONTINUE these medications which have NOT CHANGED    Details   acetaminophen (TYLENOL) 325 MG tablet Take 650 mg by mouth every 6 hours as needed for fever or pain       albuterol (VENTOLIN HFA) 108 (90 Base) MCG/ACT inhaler Inhale 2 puffs into the lungs every 6 hours as needed for shortness of breath / dyspnea     Comments: Pharmacy may dispense brand covered by insurance (Proair, or proventil or ventolin or " generic albuterol inhaler)      aspirin (ASA) 81 MG tablet Take 81 mg by mouth daily       atorvastatin (LIPITOR) 20 MG tablet Take 10 mg by mouth daily       carvedilol (COREG) 12.5 MG tablet Take 25 mg by mouth 2 times daily (with meals)       cetirizine (ZYRTEC) 10 MG tablet Take 10 mg by mouth daily      furosemide (LASIX) 40 MG tablet Take 40 mg by mouth daily       insulin lispro (HUMALOG KWIKPEN) 100 UNIT/ML (1 unit dial) KWIKPEN Inject Subcutaneous 3 times daily (before meals)      ipratropium - albuterol 0.5 mg/2.5 mg/3 mL (DUONEB) 0.5-2.5 (3) MG/3ML neb solution 3 mLs every 6 hours as needed       omeprazole (PRILOSEC) 20 MG DR capsule Take 20 mg by mouth 2 times daily as needed      sertraline (ZOLOFT) 50 MG tablet Take 50 mg by mouth daily       sevelamer carbonate (RENVELA) 800 MG tablet Take 1,600 mg by mouth 3 times daily (with meals)      traZODone (DESYREL) 50 MG tablet Take 50 mg by mouth nightly as needed         STOP taking these medications       haloperidol (HALDOL) 5 MG tablet Comments:   Reason for Stopping:         OLANZapine (ZYPREXA) 5 MG tablet Comments:   Reason for Stopping:         predniSONE (DELTASONE) 20 MG tablet Comments:   Reason for Stopping:                     Rationale for medication changes:      Refer to hospital course.        Consults       PHARMACY TO DOSE VANCO  PHARMACY TO DOSE VANCO  NEPHROLOGY IP CONSULT  PHYSICAL THERAPY ADULT IP CONSULT  OCCUPATIONAL THERAPY ADULT IP CONSULT  INFECTIOUS DISEASES IP CONSULT  SPIRITUAL HEALTH SERVICES IP CONSULT  DIABETES EDUCATION IP CONSULT  PSYCHIATRY IP CONSULT  SURGERY GENERAL IP CONSULT    Immunizations given this encounter     Most Recent Immunizations   Administered Date(s) Administered     COVID-19,PF,Moderna 02/12/2021     Influenza (High Dose) 3 valent vaccine 02/22/2018           Anticoagulation Information      Recent INR results:   Recent Labs   Lab 08/21/21  1551   INR 1.24*       SIGNIFICANT IMAGING FINDINGS      Results for orders placed or performed during the hospital encounter of 08/20/21   XR Chest Port 1 View    Impression    IMPRESSION: Tunneled right internal jugular dialysis catheter terminates in the SVC. Poststernotomy. Normal heart size and pulmonary vascularity. Mild atelectasis or scarring of the lung bases. Lungs otherwise clear. No pleural fluid or pneumothorax.   CT Chest Pulmonary Embolism w Contrast    Impression    IMPRESSION:  1.  No pulmonary embolus.  2.  Compared to 11/21/2019, progression in chronic bilateral reticulonodular interstitial infiltrates likely on the basis of atypical small airway infection such as OPAL.  3.  No acute process in the abdomen or pelvis.   Abd/pelvis CT,  IV  contrast only TRAUMA / AAA    Impression    IMPRESSION:  1.  No pulmonary embolus.  2.  Compared to 11/21/2019, progression in chronic bilateral reticulonodular interstitial infiltrates likely on the basis of atypical small airway infection such as OPAL.  3.  No acute process in the abdomen or pelvis.   CT Head w/o Contrast    Impression    IMPRESSION:  1.  No CT evidence for acute intracranial process.    2.  Brain atrophy and presumed chronic microvascular ischemic changes as above.    3.  Study performed after administration of intravenous contrast material. While this slightly limits evaluation for intracranial hemorrhage, no such intracranial hemorrhage is identified.     IR CVC Tunnel Removal Right    Impression    IMPRESSION:    1. Successful tunneled catheter removal.    CPT:  95292    The CPT codes are for physician use only.     IR CVC Tunnel Placement > 5 Yrs of Age    Impression    IMPRESSION:  Successful placement of right IJ tunnelled dialysis catheter with fluoroscopic and ultrasound guidance.    CPT:  44283  76070  03309  97332      The CPT codes are for physician use only.       SIGNIFICANT LABORATORY FINDINGS     Most Recent 3 CBC's:Recent Labs   Lab Test 08/27/21  0449 08/26/21  0445 08/25/21  0457    WBC 8.2 7.7 7.4   HGB 9.7* 9.7* 9.9*   MCV 96 95 96   * 158 163     Most Recent 3 BMP's:Recent Labs   Lab Test 08/28/21  0759 08/27/21 2056 08/27/21  1637 08/27/21  0449 08/26/21  0445 08/25/21  0457   NA  --   --   --  138 142 138   POTASSIUM  --   --   --  3.9 3.8 3.9   CHLORIDE  --   --   --  104 108* 103   CO2  --   --   --  22 21* 20*   BUN  --   --   --  20 34* 29*   CR  --   --   --  5.48* 7.49* 5.98*   ANIONGAP  --   --   --  12 13 15   NISREEN  --   --   --  7.3* 7.1* 7.3*    245* 111 80 109 81       Discharge Orders        Reason for your hospital stay    Patient admitted to hospital for altered mental status and fever, thought to have a sepsis likely due to dialysis catheter infection which was replaced, completed antibiotic course.  Also had abdominal discomfort with peritoneal dialysis catheter which was repositioned.     Activity    Your activity upon discharge: activity as tolerated     Discharge Instructions    1. Monitor blood sugar as recommended by PCP, call PCP if blood sugar less than 70 or more than 400.  2. Per nephrology, continue outpatient dialysis Monday Wednesday Friday.    3. Patient advised to use home nebulizer 2 times daily scheduled on top of as needed until follow up with PCP.    4. Patient advised to discussed with PCP about his anxiety/depression medications on follow up.     Follow-up and recommended labs and tests     Follow up with primary care provider, VICTOR M WOLFE, within 7 days for hospital follow- up.  The following labs/tests are recommended: CBC, BMP.  Please also review patient's home anti-psychotic medications (no dose/frequency on Zyprexa and haldol  prior to admission and held on discharge)    Patient advised to follow-up with primary nephrologist as recommended.     Diet    Follow this diet upon discharge: Orders Placed This Encounter      Snacks/Supplements Adult: Nepro Oral Supplement; With Meals      Snacks/Supplements Adult: Suplena Oral  Supplement; With Meals      Consistent Carbohydrate Diet Consistent Carb 60 grams CHO per Meal Diet       Examination   Physical Exam   Temp:  [97.5  F (36.4  C)-98.7  F (37.1  C)] 98.3  F (36.8  C)  Pulse:  [51-83] 83  Resp:  [10-23] 18  BP: ()/(50-94) 150/94  FiO2 (%):  [2 %-30 %] 21 %  SpO2:  [95 %-100 %] 96 %  Wt Readings from Last 1 Encounters:   08/27/21 100 kg (220 lb 8 oz)       Patient seen and examined.  Notes, labs, imaging report personally reviewed.  Overnight reported abdominal postoperative pain but no associated nausea, vomiting, fever or chills.  Denies short of breath or chest pain.    Had detailed conversation with patient and family member yesterday about discharge plan.  Also had discussion with nephrologist yesterday, reported patient will be dialyzed on Monday and they do not need to see him today prior to discharge..  Discussed with nursing staffs.  Discussed with pharmacist.    General: Not in obvious distress.  HEENT: Normocephalic, supple neck  Chest: Clear to auscultation bilateral anteriorly, unlabored breathing  Heart: S1S2 normal, regular  Abdomen: Soft.   Minimal tenderness at incision site around PD catheter, no rebound or guarding.  Bowel sounds positive.  Extremities: No legs swelling  Neuro: alert and awake, moves all extremities    Please see EMR for more detailed significant labs, imaging, consultant notes etc.    Nba BOLANOS MD, personally saw the patient today and spent greater than 30 minutes discharging this patient.    Nba DUONG MD  [facility]    CC:Mc Gomez

## 2021-08-28 NOTE — PLAN OF CARE
Problem: Adult Inpatient Plan of Care  Goal: Plan of Care Review  Outcome: Improving   Pt was alert and oriented x4. Pt is c/o abdominal pain. Given one dose of IV dilaudid for pain relief and one dose of prn Ativan for anxiety with good result. Given bedtime snacks. VSS/ afebrile. Pt is scheduled dialysis on Monday. Abdomina incision  covered with 4x4 dry gauze and is C/D/I at this time.

## 2021-08-28 NOTE — PROGRESS NOTES
Care Management Discharge Note    Discharge Date: 08/28/2021       Discharge Disposition: Home    Discharge Services: None    Discharge DME: None    Discharge Transportation: family or friend will provide    Private pay costs discussed: Not applicable    PAS Confirmation Code:    Patient/family educated on Medicare website which has current facility and service quality ratings: no    Education Provided on the Discharge Plan:    Persons Notified of Discharge Plans: patient  Patient/Family in Agreement with the Plan:      Handoff Referral Completed: No    Additional Information:  Discharge home today with no needs         Ladonna Pandya RN

## 2021-08-29 NOTE — PLAN OF CARE
Occupational Therapy Discharge Summary    Reason for therapy discharge:    Discharged to home.    Progress towards therapy goal(s). See goals on Care Plan in [facility] electronic health record for goal details.  Goals not met.  Barriers to achieving goals:   refusal to participate.    Therapy recommendation(s):    No further therapy is recommended.

## 2021-08-30 LAB
A FUMIGATUS IGE QN: 58.3 KU/L
A FUMIGATUS1 AB SER QL ID: ABNORMAL
A FUMIGATUS6 AB SER QL ID: ABNORMAL
IGE SERPL-ACNC: 1661 KU/L

## 2021-09-07 DIAGNOSIS — J45.50: Primary | ICD-10-CM

## 2021-09-07 NOTE — RESULT ENCOUNTER NOTE
Results reviewed and are concerning for ABPA. I recommend patient be referred to a pulmonologist for evaluation and treatment.    I saw the patient during a recent hospitalization at [facility]. I contacted him with these results after discharge and also communicated with his PCP Dr. Mc Gomez at [facility].   Please fax these results to his clinic (fax: [phone])

## 2021-09-23 ENCOUNTER — TELEPHONE (OUTPATIENT)
Dept: PULMONOLOGY | Facility: CLINIC | Age: 71
End: 2021-09-23

## 2021-09-23 ENCOUNTER — TRANSCRIBE ORDERS (OUTPATIENT)
Dept: OTHER | Age: 71
End: 2021-09-23

## 2021-09-23 DIAGNOSIS — B44.81 ALLERGIC BRONCHOPULMONARY ASPERGILLOSIS (H): Primary | ICD-10-CM

## 2021-09-23 NOTE — TELEPHONE ENCOUNTER
[facility] Call Center    Phone Message    May a detailed message be left on voicemail: yes     Reason for Call: Appointment Intake    Referring Provider Name: Mc Gomez    Diagnosis and/or Symptoms: Allergic bronchopulmonary aspergillosis - ASAP - please review urgency.  Thank you.    Action Taken: Message routed to:  [facility] ([facility] pulm    Travel Screening: Not Applicable

## 2021-09-24 ENCOUNTER — TELEPHONE (OUTPATIENT)
Dept: PULMONOLOGY | Facility: CLINIC | Age: 71
End: 2021-09-24

## 2021-09-24 NOTE — TELEPHONE ENCOUNTER
Spoke with patient and patient stated that he was unsure why he was being referred to this pulmonary clinic. Patient did not prefer to schedule yet and stated that he would call the referring provider's office to get clarity.Patient also stated that he does not have working transportation at this time to come to the clinic. Advised the patient that he would not be eligible for virtual visits as [place] in state. Advised the patient to call back if/when he would like to be scheduled after speaking with referring provider.

## 2021-09-24 NOTE — TELEPHONE ENCOUNTER
Called patient to schedule a new patient appointment per referral received. No answer. Left voicemail.

## 2021-10-18 LAB — ACID FAST STAIN (ARUP): NORMAL

## 2023-01-01 ENCOUNTER — HOSPITAL ENCOUNTER (OUTPATIENT)
Age: 73
End: 2023-01-01
Attending: INTERNAL MEDICINE | Admitting: INTERNAL MEDICINE
Payer: MEDICARE

## (undated) DEVICE — ENDO TROCAR SLEEVE KII Z-THREADED 05X100MM CTS02

## (undated) DEVICE — NEEDLE HYPO 25X1 SAFETY 305916

## (undated) DEVICE — DRSG GAUZE 4X4" TOPPER

## (undated) DEVICE — PREP DURAPREP 26ML APL 8630

## (undated) DEVICE — Device

## (undated) DEVICE — DEVICE ENDO STITCH APPLIER 10MM 173016

## (undated) DEVICE — BANDAGE ADH LF 1X3 ABN3100A

## (undated) DEVICE — GLOVE BIOGEL PI SZ 7.5 40875

## (undated) DEVICE — ENDO TROCAR SHIELDED BLADED KII Z-THRD 11X100MM CTB33

## (undated) DEVICE — SYR 10ML LL W/O NDL 302995

## (undated) DEVICE — SOL WATER IRRIG 1000ML BOTTLE 2F7114

## (undated) DEVICE — SU SILK 2-0 FS-1 18" 685G

## (undated) DEVICE — SYR 50ML LL W/O NDL 309653

## (undated) DEVICE — SOL NACL 0.9% IRRIG 1000ML BOTTLE 2F7124

## (undated) DEVICE — SOL RINGERS LACTATED 1000ML BAG 2B2324X

## (undated) DEVICE — ENDO SHEARS RENEW LAP ENDOCUT SCISSOR TIP 16.5MM 3142

## (undated) DEVICE — GLOVE SURG PI ULTRA TOUCH M SZ 7-1/2 LF

## (undated) DEVICE — DRSG GAUZE 4X4" 3033

## (undated) DEVICE — TAPE ADH POROUS 2IN CURITY STD 6613

## (undated) DEVICE — SYSTEM CLEARIFY VISUALIZATION 21-345

## (undated) DEVICE — NDL INSUFFLATION 13GA 120MM C2201

## (undated) DEVICE — SUTURE VICRYL+ 4-0 UNDYED PS-2 VCP496H

## (undated) DEVICE — CUSTOM PACK LAP CHOLE SBA5BLCHEA

## (undated) DEVICE — SU ENDO STITICH SURGIDAC 0 ES-9 48"  173024

## (undated) DEVICE — GLOVE BIOGEL PI ULTRATOUCH G SZ 6.0 42160

## (undated) RX ORDER — LIDOCAINE HYDROCHLORIDE 10 MG/ML
INJECTION, SOLUTION INFILTRATION; PERINEURAL
Status: DISPENSED
Start: 2021-08-24

## (undated) RX ORDER — ALBUTEROL SULFATE 0.83 MG/ML
SOLUTION RESPIRATORY (INHALATION)
Status: DISPENSED
Start: 2019-10-16

## (undated) RX ORDER — FENTANYL CITRATE 50 UG/ML
INJECTION, SOLUTION INTRAMUSCULAR; INTRAVENOUS
Status: DISPENSED
Start: 2021-08-24

## (undated) RX ORDER — HEPARIN SODIUM 1000 [USP'U]/ML
INJECTION, SOLUTION INTRAVENOUS; SUBCUTANEOUS
Status: DISPENSED
Start: 2021-08-24

## (undated) RX ORDER — LIDOCAINE HYDROCHLORIDE 10 MG/ML
INJECTION, SOLUTION EPIDURAL; INFILTRATION; INTRACAUDAL; PERINEURAL
Status: DISPENSED
Start: 2021-08-23

## (undated) RX ORDER — BUPIVACAINE HYDROCHLORIDE 2.5 MG/ML
INJECTION, SOLUTION INFILTRATION; PERINEURAL
Status: DISPENSED
Start: 2021-08-27